# Patient Record
Sex: MALE | Race: WHITE | NOT HISPANIC OR LATINO | Employment: FULL TIME | URBAN - METROPOLITAN AREA
[De-identification: names, ages, dates, MRNs, and addresses within clinical notes are randomized per-mention and may not be internally consistent; named-entity substitution may affect disease eponyms.]

---

## 2017-04-03 ENCOUNTER — ALLSCRIPTS OFFICE VISIT (OUTPATIENT)
Dept: OTHER | Facility: OTHER | Age: 42
End: 2017-04-03

## 2017-04-04 LAB — S PYO AG THROAT QL: NEGATIVE

## 2017-04-06 ENCOUNTER — GENERIC CONVERSION - ENCOUNTER (OUTPATIENT)
Dept: OTHER | Facility: OTHER | Age: 42
End: 2017-04-06

## 2017-04-07 ENCOUNTER — ALLSCRIPTS OFFICE VISIT (OUTPATIENT)
Dept: OTHER | Facility: OTHER | Age: 42
End: 2017-04-07

## 2017-07-06 ENCOUNTER — ALLSCRIPTS OFFICE VISIT (OUTPATIENT)
Dept: OTHER | Facility: OTHER | Age: 42
End: 2017-07-06

## 2017-07-19 ENCOUNTER — ALLSCRIPTS OFFICE VISIT (OUTPATIENT)
Dept: OTHER | Facility: OTHER | Age: 42
End: 2017-07-19

## 2017-07-31 ENCOUNTER — GENERIC CONVERSION - ENCOUNTER (OUTPATIENT)
Dept: OTHER | Facility: OTHER | Age: 42
End: 2017-07-31

## 2017-08-02 ENCOUNTER — ALLSCRIPTS OFFICE VISIT (OUTPATIENT)
Dept: OTHER | Facility: OTHER | Age: 42
End: 2017-08-02

## 2017-08-02 LAB
A/G RATIO (HISTORICAL): 2.2 (ref 1.2–2.2)
ALBUMIN SERPL BCP-MCNC: 4.4 G/DL (ref 3.5–5.5)
ALP SERPL-CCNC: 55 IU/L (ref 39–117)
ALT SERPL W P-5'-P-CCNC: 20 IU/L (ref 0–44)
AST SERPL W P-5'-P-CCNC: 27 IU/L (ref 0–40)
BILIRUB SERPL-MCNC: 0.8 MG/DL (ref 0–1.2)
BUN SERPL-MCNC: 23 MG/DL (ref 6–24)
BUN/CREA RATIO (HISTORICAL): 21 (ref 9–20)
CALCIUM SERPL-MCNC: 8.8 MG/DL (ref 8.7–10.2)
CHLORIDE SERPL-SCNC: 101 MMOL/L (ref 96–106)
CO2 SERPL-SCNC: 24 MMOL/L (ref 18–29)
CREAT SERPL-MCNC: 1.08 MG/DL (ref 0.76–1.27)
EGFR AFRICAN AMERICAN (HISTORICAL): 98 ML/MIN/1.73
EGFR-AMERICAN CALC (HISTORICAL): 85 ML/MIN/1.73
GLUCOSE SERPL-MCNC: 87 MG/DL (ref 65–99)
POTASSIUM SERPL-SCNC: 4.6 MMOL/L (ref 3.5–5.2)
PROSTATE SPECIFIC ANTIGEN (HISTORICAL): 0.6 NG/ML (ref 0–4)
SODIUM SERPL-SCNC: 139 MMOL/L (ref 134–144)
TOT. GLOBULIN, SERUM (HISTORICAL): 2 G/DL (ref 1.5–4.5)
TOTAL PROTEIN (HISTORICAL): 6.4 G/DL (ref 6–8.5)

## 2017-08-03 ENCOUNTER — GENERIC CONVERSION - ENCOUNTER (OUTPATIENT)
Dept: OTHER | Facility: OTHER | Age: 42
End: 2017-08-03

## 2017-09-18 ENCOUNTER — ALLSCRIPTS OFFICE VISIT (OUTPATIENT)
Dept: OTHER | Facility: OTHER | Age: 42
End: 2017-09-18

## 2017-11-30 ENCOUNTER — GENERIC CONVERSION - ENCOUNTER (OUTPATIENT)
Dept: OTHER | Facility: OTHER | Age: 42
End: 2017-11-30

## 2017-11-30 ENCOUNTER — ALLSCRIPTS OFFICE VISIT (OUTPATIENT)
Dept: OTHER | Facility: OTHER | Age: 42
End: 2017-11-30

## 2018-01-12 VITALS
BODY MASS INDEX: 25.01 KG/M2 | DIASTOLIC BLOOD PRESSURE: 75 MMHG | HEIGHT: 68 IN | SYSTOLIC BLOOD PRESSURE: 130 MMHG | WEIGHT: 165 LBS

## 2018-01-12 VITALS
RESPIRATION RATE: 16 BRPM | TEMPERATURE: 97.6 F | HEIGHT: 68 IN | BODY MASS INDEX: 28.49 KG/M2 | SYSTOLIC BLOOD PRESSURE: 124 MMHG | DIASTOLIC BLOOD PRESSURE: 78 MMHG | WEIGHT: 188 LBS | HEART RATE: 76 BPM

## 2018-01-13 VITALS
WEIGHT: 167 LBS | TEMPERATURE: 96.9 F | HEART RATE: 72 BPM | HEIGHT: 68 IN | BODY MASS INDEX: 25.31 KG/M2 | DIASTOLIC BLOOD PRESSURE: 78 MMHG | RESPIRATION RATE: 16 BRPM | SYSTOLIC BLOOD PRESSURE: 118 MMHG

## 2018-01-13 NOTE — RESULT NOTES
Verified Results  (1) COMPREHENSIVE METABOLIC PANEL [id number] 94:91[facility]     Test Name Result Flag Reference   Glucose, Serum 87 mg/dL  65-99   BUN 23 mg/dL  6-24   Creatinine, Serum 1 08 mg/dL  0 76-1 27   BUN/Creatinine Ratio 21 H 9-20   Sodium, Serum 139 mmol/L  134-144   Potassium, Serum 4 6 mmol/L  3 5-5 2   Chloride, Serum 101 mmol/L     Carbon Dioxide, Total 24 mmol/L  18-29   Calcium, Serum 8 8 mg/dL  8 7-10 2   Protein, Total, Serum 6 4 g/dL  6 0-8 5   Albumin, Serum 4 4 g/dL  3 5-5 5   Globulin, Total 2 0 g/dL  1 5-4 5   A/G Ratio 2 2  1 2-2 2   Bilirubin, Total 0 8 mg/dL  0 0-1 2   Alkaline Phosphatase, S 55 IU/L     AST (SGOT) 27 IU/L  0-40   ALT (SGPT) 20 IU/L  0-44   eGFR If NonAfricn Am 85 mL/min/1 73  >59   eGFR If Africn Am 98 mL/min/1 73  >59

## 2018-01-14 VITALS
HEART RATE: 72 BPM | SYSTOLIC BLOOD PRESSURE: 110 MMHG | DIASTOLIC BLOOD PRESSURE: 70 MMHG | WEIGHT: 188 LBS | TEMPERATURE: 97 F | RESPIRATION RATE: 12 BRPM | BODY MASS INDEX: 28.59 KG/M2

## 2018-01-15 VITALS
WEIGHT: 165 LBS | SYSTOLIC BLOOD PRESSURE: 102 MMHG | BODY MASS INDEX: 25.01 KG/M2 | HEIGHT: 68 IN | DIASTOLIC BLOOD PRESSURE: 63 MMHG

## 2018-01-16 NOTE — MISCELLANEOUS
Message   Recorded as Task   Date: 04/06/2017 12:08 PM, Created By: Orly Nash   Task Name: Call Back   Assigned To: Daryl Orta   Regarding Patient: Nasra Garcia, Status: Active   Comment:    Rick Roy - 06 Apr 2017 12:08 PM     TASK CREATED  I left a message for Calli Lofton to call back  He had some questions and I left a message for him to call back so I can see what he needs Indra Peters - 06 Apr 2017 3:02 PM     TASK EDITED  I spoke with Calli Lofton  He had a bowel movement yesterday and he noticed bright red blood and then  again today with his bowel movement  No constipation  No pain with bowel movement  He just had a colonoscopy on 1/6/17 by Dr Sherly Alvarado and it was normal  He has no known hemorrhoids  He did have hemorrhoids removed last year  I made an appt for him to have this evaluated Indra Peters - 06 Apr 2017 4:21 PM     TASK EDITED  Appt for tomorrow with Dr Rubi Duran  He feels fine and has no pain  He is aware to call back at any time with any questions or concerns  The blood is only during his bowel movements and not in between  Alessia Stephenson - 06 Apr 2017 10:58 PM     TASK EDITED  noted        Signatures   Electronically signed by : Ally Galvan DO;  Apr 6 2017 10:58PM EST                       (Author)

## 2018-01-17 NOTE — PROGRESS NOTES
Assessment    1  Benign essential hypertension (401 1) (I10)   2  Encounter for preventive health examination (V70 0) (Z00 00)   3  Immunization due (V05 9) (Z23)   4  Neck pain (723 1) (M54 2)    Plan  Benign essential hypertension    · Olmesartan Medoxomil 20 MG Oral Tablet (Benicar); TAKE 1 TABLET DAILY   · (1) COMPREHENSIVE METABOLIC PANEL; Status:Active; Requested for:73Uzb0843;    · (1) LIPID PANEL FASTING W DIRECT LDL REFLEX; Status:Active; Requested  for:85Wcw9832;   Immunization due    · Fluzone Quadrivalent Intramuscular Suspension  Neck pain    · 1 Yajaira Domínguez MD, [facility]  *  Status: Hold For -  Scheduling  Requested for: 42UDU6917  Care Summary provided  : Yes    Discussion/Summary  The patient was counseled regarding risk factor reductions, impressions, risks and benefits of treatment options, importance of compliance with treatment  Possible side effects of new medications were reviewed with the patient/guardian today  The treatment plan was reviewed with the patient/guardian  The patient/guardian understands and agrees with the treatment plan      Chief Complaint  Seen for a CPE  er/cma  History of Present Illness  HM, Adult Male: The patient is being seen for a health maintenance evaluation  General Health: The patient's health since the last visit is described as good  Immunizations status: up to date  Lifestyle:  He consumes a diverse and healthy diet  He exercises regularly  He does not use tobacco  He denies alcohol use  Screening:   HPI: htn for over 7y  fam hx of htn  no other meds in past    left neck discomfort years      Review of Systems    Cardiovascular: no chest pain and no palpitations  Respiratory: no shortness of breath  Gastrointestinal: no abdominal pain  Active Problems    1  Adult BMI 25 0-25 9 kg/sq m (V85 21) (Z68 25)   2  Allergic rhinitis due to other allergic trigger, unspecified rhinitis seasonality   3   Benign essential hypertension (401 1) (I10)   4  Hallux rigidus of right foot (735 2) (M20 21)   5  Immunization due (V05 9) (Z23)   6  Internal hemorrhoids (455 0) (K64 8)   7  Prostate cancer screening (V76 44) (Z12 5)   8  Screening for cardiovascular, respiratory, and genitourinary diseases   (V81 2,V81 4,V81 6) (Z13 6,Z13 83,Z13 89)   9  Screening for diabetes mellitus (DM) (V77 1) (Z13 1)   10   Well adult on routine health check (V70 0) (Z00 00)    Past Medical History    · History of Acute conjunctivitis of right eye (372 00) (H10 31)   · History of Acute pharyngitis, unspecified etiology (462) (J02 9)   · Acute upper respiratory infection (465 9) (J06 9)   · History of Acute URI (465 9) (J06 9)   · History of Blood pressure elevated (401 9) (I10)   · History of Cellulitis of toe of right foot (681 10) (L03 031)   · History of Cough (786 2) (R05)   · History of Difficult or painful urination (788 1) (R30 0)   · History of Eye irritation (379 99) (H57 8)   · History of Eye pain (379 91) (H57 10)   · History of acute sinusitis (V12 69) (Z87 09)   · History of corneal abrasion (V15 59) (W36 132)   · History of ingrown nail (V13 3) (Z87 2)   · History of labyrinthitis (V12 49) (Z86 69)   · History of upper respiratory infection (V12 09) (Z87 09)   · History of urinary frequency (V13 09) (Z87 898)   · History of viral infection (V12 09) (Z86 19)   · History of IFG (impaired fasting glucose) (790 21) (R73 01)   · History of Leg pain (729 5) (M79 606)   · History of Oral lesion (528 9) (K13 70)   · History of Paronychia of toe of right foot (681 11) (L03 031)   · History of Prediabetes (790 29) (R73 03)   · History of Screening for cardiovascular condition (V81 2) (Z13 6)   · History of Screening for diabetes mellitus (V77 1) (Z13 1)   · History of Screening for thyroid disorder (V77 0) (Z13 29)   · History of Sprained Left Shoulder (840 9)    Surgical History    · History of Appendectomy   · History of Hernia Repair   · History of Knee Surgery   · History of Shoulder Surgery    Family History  Father    · Family history of CAD (coronary artery disease)   · Family history of Prostate cancer  Family History    · Family history of hypertension (V17 49) (Z82 49)   · Family history of Lymphoma (V16 7)   · Family history of Skin Cancer (V16 8)    Social History    · Being A Social Drinker   · Never a smoker    Current Meds   1  Anusol-HC 25 MG Rectal Suppository; insert one suppository 3x/d as needed; Therapy: 13BFJ0667 to (Last Rx:07Apr2017)  Requested for: 07Apr2017 Ordered   2  Claritin TABS; TAKE 1 TABLET DAILY AS NEEDED; Therapy: (Recorded:06Apr2017) to Recorded   3  Fish Oil CAPS; take 1 capsule daily; Therapy: (Recorded:18Sep2017) to Recorded   4  Fluticasone Propionate 50 MCG/ACT Nasal Suspension; USE 2 SPRAYS IN EACH   NOSTRIL ONCE DAILY; Therapy: 44VEL0776 to (Last Rx:06Apr2017)  Requested for: 06Apr2017 Ordered   5  Olmesartan Medoxomil 20 MG Oral Tablet; TAKE 1 TABLET DAILY; Therapy: 30FTH8075 to [phone])  Requested for: 96HZV6492; Last   Rx:05Poy5848 Ordered   6  Vitamin A 38517 UNIT Oral Tablet; TAKE 1 TABLET DAILY; Therapy: (Recorded:18Sep2017) to Recorded   7  Vitamin B Complex CAPS; take 1 capsule daily; Therapy: (Recorded:18Sep2017) to Recorded   8  Vitamin C TABS; TAKE 1 TABLET DAILY; Therapy: (Recorded:18Sep2017) to Recorded   9  Vitamin D TABS; Take 1 tablet daily; Therapy: (Recorded:45Tjw4252) to Recorded   10  Vitamin E TABS; TAKE 1 TABLET DAILY; Therapy: (Recorded:82Old9994) to Recorded    Allergies    1  Lisinopril TABS    2  Bee sting   3  Other   4  Seasonal    Vitals   Recorded: 18Sep2017 09:47AM   Temperature 97 5 F   Heart Rate 76   Respiration 20   Systolic 203   Diastolic 70   Height 5 ft 8 in   Weight 170 lb    BMI Calculated 25 85   BSA Calculated 1 91     Physical Exam    Constitutional   General appearance: No acute distress, well appearing and well nourished      Eyes Conjunctiva and lids: No erythema, swelling or discharge  Pupils and irises: Equal, round, reactive to light  Ears, Nose, Mouth, and Throat   External inspection of ears and nose: Normal     Otoscopic examination: Tympanic membranes translucent with normal light reflex  Canals patent without erythema  Nasal mucosa, septum, and turbinates: Normal without edema or erythema  Lips, teeth, and gums: Normal, good dentition  Oropharynx: Normal with no erythema, edema, exudate or lesions  Neck   Neck: Supple, symmetric, trachea midline, no masses  Pulmonary   Respiratory effort: No increased work of breathing or signs of respiratory distress  Auscultation of lungs: Clear to auscultation  Cardiovascular   Auscultation of heart: Normal rate and rhythm, normal S1 and S2, no murmurs  Carotid pulses: 2+ bilaterally  Pedal pulses: 2+ bilaterally  Examination of extremities for edema and/or varicosities: Normal     Chest   Chest: Normal     Abdomen   Abdomen: Non-tender, no masses  Liver and spleen: No hepatomegaly or splenomegaly  Examination for hernias: No hernias appreciated  small RIH  Anus, perineum, and rectum: Normal sphincter tone, no masses, no prolapse  Genitourinary   Scrotal contents: Normal testes, no masses  Penis: Normal, no lesions  Digital rectal exam of prostate: Normal size, no masses  Lymphatic   Palpation of lymph nodes in neck: No lymphadenopathy  Palpation of lymph nodes in groin: No lymphadenopathy  Musculoskeletal   Gait and station: Normal     Inspection/palpation of digits and nails: Normal without clubbing or cyanosis  Inspection/palpation of joints, bones, and muscles: Normal     Skin   Skin and subcutaneous tissue: Normal without rashes or lesions  Palpation of skin and subcutaneous tissue: Normal turgor  Neurologic   Reflexes: 2+ and symmetric  Sensation: No sensory loss      Psychiatric   Judgment and insight: Normal  Mood and affect: Normal        Procedure    Procedure: Visual Acuity Test    Indication: routine screening  Inforrmation supplied by a Snellen chart     Results: 20/13 in both eyes without corrective device, 20/13 in the right eye without corrective device, 20/13 in the left eye without corrective device      Provider Comments  Provider Comments:   months, pressure, occas pain, radiates to ear and side of mouth and left side of tongue---ENt eval      Signatures   Electronically signed by : Federico Mcclure DO; Sep 18 2017 12:53PM EST                       (Author)

## 2018-01-17 NOTE — RESULT NOTES
Verified Results  (1) CBC/PLT/DIFF [id number] 09:13AM Shan[facility] Valle     Test Name Result Flag Reference   WBC 5 8 x10E3/uL  3 4-10 8   RBC 4 92 x10E6/uL  4 14-5 80   Hemoglobin 14 8 g/dL  12 6-17 7   Hematocrit 43 9 %  37 5-51 0   MCV 89 fL  79-97   MCH 30 1 pg  26 6-33 0   Baso (Absolute) 0 0 x10E3/uL  0 0-0 2   Immature Granulocytes 0 %     Immature Grans (Abs) 0 0 x10E3/uL  0 0-0 1   Eos 2 %     Basos 1 %     Neutrophils (Absolute) 3 4 x10E3/uL  1 4-7 0   Lymphs (Absolute) 1 8 x10E3/uL  0 7-3 1   Monocytes(Absolute) 0 5 x10E3/uL  0 1-0 9   Eos (Absolute) 0 1 x10E3/uL  0 0-0 4   MCHC 33 7 g/dL  31 5-35 7   RDW 13 6 %  12 3-15 4   Platelets 005 E56E7/XV  150-379   Neutrophils 57 %     Lymphs 32 %     Monocytes 8 %       (1) COMPREHENSIVE METABOLIC PANEL [id number] 59:09NW [facility]elle Val[facility]     Test Name Result Flag Reference   Glucose, Serum 104 mg/dL H 65-99   BUN 25 mg/dL H 6-24   Creatinine, Serum 0 97 mg/dL  0 76-1 27   eGFR If NonAfricn Am 97 mL/min/1 73  >59   eGFR If Africn Am 112 mL/min/1 73  >59   BUN/Creatinine Ratio 26 H 9-20   ALT (SGPT) 21 IU/L  0-44   Albumin, Serum 4 5 g/dL  3 5-5 5   Globulin, Total 2 3 g/dL  1 5-4 5   A/G Ratio 2 0  1 1-2 5   Bilirubin, Total 0 4 mg/dL  0 0-1 2   Alkaline Phosphatase, S 77 IU/L     AST (SGOT) 18 IU/L  0-40   Sodium, Serum 141 mmol/L  134-144   Potassium, Serum 4 6 mmol/L  3 5-5 2   Chloride, Serum 102 mmol/L     Carbon Dioxide, Total 26 mmol/L  18-29   Calcium, Serum 8 7 mg/dL  8 7-10 2   Protein, Total, Serum 6 8 g/dL  6 0-8 5     (1) LIPID PANEL FASTING W DIRECT LDL REFLEX [id number] 09:13AM Shanelle Valle     Test Name Result Flag Reference   Cholesterol, Total 179 mg/dL  100-199   Triglycerides 160 mg/dL H 0-149   HDL Cholesterol 37 mg/dL L >39   According to ATP-III Guidelines, HDL-C >59 mg/dL is considered a  negative risk factor for CHD     LDL Cholesterol Calc 110 mg/dL H 0-99       Discussion/Summary   Your sugar is mildly elevated as well as your cholesterol  Please follow a heart healthy diet and exercise  recommend starting over the counter omega 3  Follow up at your regularly scheduled appointment    rl

## 2018-01-22 VITALS
BODY MASS INDEX: 25.76 KG/M2 | HEART RATE: 76 BPM | RESPIRATION RATE: 20 BRPM | WEIGHT: 170 LBS | SYSTOLIC BLOOD PRESSURE: 108 MMHG | DIASTOLIC BLOOD PRESSURE: 70 MMHG | TEMPERATURE: 97.5 F | HEIGHT: 68 IN

## 2018-01-22 VITALS
WEIGHT: 180 LBS | HEIGHT: 68 IN | DIASTOLIC BLOOD PRESSURE: 96 MMHG | BODY MASS INDEX: 27.28 KG/M2 | SYSTOLIC BLOOD PRESSURE: 140 MMHG

## 2018-03-07 NOTE — CONSULTS
Assessment    1  TMJ syndrome (825 13) (D20 236)    Chief Complaint  Chief Complaint Free Text Note Form: Mragot Frausto is here with c/o swelling,numbness left side of his face, mostly jaw,neck,and ear for over a month      History of Present Illness  HPI: He presents with left neck swelling with associated intermittent left facial swelling  He has had some intermittent left sided facial and tongue numbness as well  Symptoms present for several months  Some weight gain  Normal energy level  Denies jaw or throat/neck pain  Denies odynophagia  Some associated intermittent otalgia and a fullness sensation in the left ear  Otalgia has been severe for up to 2 days during this interval  Not sure whether the otalgia and facial swelling were temporally related  Denies neck/facial increase in swelling with eating  Denies dysphagia  Family history of: esophageal cancer (grandfather), melanoma (grandfather); father and grandfather (prostate cancer); lymphoma (grandfather)      Review of Systems  Complete ENT ROS [facility]:   Eyes: No complaints of itching, excessive tearing or vision changes  Ears: Pressure in left ear and tinnitus, but as noted in HPI  Nose: No nasal obstruction, no discharge or runniness, no bleeding, no dryness, no sneezing and no loss of smell  Mouth: numbness tongue and left side, but as noted in HPI  Throat: throat pain, but no difficulty swallowing  Neck: Left side, soreness and lump or swelling in the neck, but as noted in HPI  Genitourinary: frequent urination  Cardiovascular: No complaints of chest pain or palpitations  Respiratory: cough  Gastrointestinal: No complaints of heartburn, nausea/vomiting, or constipation  Neurological: No complaints of headache, convulsions or memory loss  ROS Reviewed:   ROS reviewed  Active Problems    1  Adult BMI 25 0-25 9 kg/sq m (V85 21) (Z68 25)   2  Allergic rhinitis due to other allergic trigger, unspecified rhinitis seasonality   3  Benign essential hypertension (401 1) (I10)   4  Hallux rigidus of right foot (735 2) (M20 21)   5  Immunization due (V05 9) (Z23)   6  Internal hemorrhoids (455 0) (K64 8)   7  Neck pain (723 1) (M54 2)   8  Prostate cancer screening (V76 44) (Z12 5)   9  Screening for cardiovascular, respiratory, and genitourinary diseases   (V81 2,V81 4,V81 6) (Z13 6,Z13 83,Z13 89)   10  Screening for diabetes mellitus (DM) (V77 1) (Z13 1)   11  Well adult on routine health check (V70 0) (Z00 00)    Past Medical History    1  History of Acute conjunctivitis of right eye (372 00) (H10 31)   2  History of Acute pharyngitis, unspecified etiology (462) (J02 9)   3  Acute upper respiratory infection (465 9) (J06 9)   4  History of Acute URI (465 9) (J06 9)   5  History of Blood pressure elevated (401 9) (I10)   6  History of Cellulitis of toe of right foot (681 10) (L03 031)   7  History of Cough (786 2) (R05)   8  History of Difficult or painful urination (788 1) (R30 0)   9  History of Eye irritation (379 99) (H57 8)   10  History of Eye pain (379 91) (H57 10)   11  History of acute sinusitis (V12 69) (Z87 09)   12  History of corneal abrasion (V15 59) (Z87 828)   13  History of ingrown nail (V13 3) (Z87 2)   14  History of labyrinthitis (V12 49) (Z86 69)   15  History of upper respiratory infection (V12 09) (Z87 09)   16  History of urinary frequency (V13 09) (Z87 898)   17  History of viral infection (V12 09) (Z86 19)   18  History of IFG (impaired fasting glucose) (790 21) (R73 01)   19  History of Leg pain (729 5) (M79 606)   20  History of Oral lesion (528 9) (K13 70)   21  History of Paronychia of toe of right foot (681 11) (L03 031)   22  History of Prediabetes (790 29) (R73 03)   23  History of Screening for cardiovascular condition (V81 2) (Z13 6)   24  History of Screening for diabetes mellitus (V77 1) (Z13 1)   25  History of Screening for thyroid disorder (V77 0) (Z13 29)   26   History of Sprained Left Shoulder (840 9)  Past Medical History Reviewed: The past medical history was reviewed and updated today  Surgical History    1  History of Appendectomy   2  History of Hernia Repair   3  History of Knee Surgery   4  History of Shoulder Surgery  Surgical History Reviewed: The surgical history was reviewed and updated today  Family History  Father    1  Family history of CAD (coronary artery disease)   2  Family history of Prostate cancer  Family History    3  Family history of hypertension (V17 49) (Z82 49)   4  Family history of Lymphoma (V16 7)   5  Family history of Skin Cancer (V16 8)  Family History Reviewed: The family history was reviewed and updated today  Social History    · Being A Social Drinker   · Never a smoker  Social History Reviewed: The social history was reviewed and updated today  Current Meds   1  Anusol-HC 25 MG Rectal Suppository; insert one suppository 3x/d as needed; Therapy: 19SKF1412 to (Last Rx:07Apr2017)  Requested for: 07Apr2017 Ordered   2  Claritin TABS; TAKE 1 TABLET DAILY AS NEEDED; Therapy: (Recorded:06Apr2017) to Recorded   3  Fish Oil CAPS; take 1 capsule daily; Therapy: (Recorded:17Gim1211) to Recorded   4  Fluticasone Propionate 50 MCG/ACT Nasal Suspension; USE 2 SPRAYS IN EACH   NOSTRIL ONCE DAILY; Therapy: 81EPE6404 to (Last Rx:06Apr2017)  Requested for: 06Apr2017 Ordered   5  Olmesartan Medoxomil 20 MG Oral Tablet; TAKE 1 TABLET DAILY; Therapy: 37QQF7755 to (Evaluate:23Gxe9365)  Requested for: 18Sep2017; Last   Rx:18Sep2017 Ordered   6  Vitamin A 98954 UNIT Oral Tablet; TAKE 1 TABLET DAILY; Therapy: (Recorded:20Ujo2598) to Recorded   7  Vitamin B Complex CAPS; take 1 capsule daily; Therapy: (Recorded:80Ulx4003) to Recorded   8  Vitamin C TABS; TAKE 1 TABLET DAILY; Therapy: (Recorded:51Ivt8886) to Recorded   9  Vitamin D TABS; Take 1 tablet daily; Therapy: (Recorded:79Ovl2604) to Recorded   10   Vitamin E TABS; TAKE 1 TABLET DAILY; Therapy: (Recorded:27Evv3873) to Recorded    Allergies    1  Lisinopril TABS    2  Bee sting   3  Other   4  Seasonal    Vitals  Signs   Recorded: 12YXM5212 76:98NU   Systolic: 266, LUE, Sitting  Diastolic: 96, LUE, Sitting  Height: 5 ft 8 in  Weight: 180 lb   BMI Calculated: 27 37  BSA Calculated: 1 95    Physical Exam  Constitutional:  Well developed, well nourished and groomed, in no acute distress  Eyes:  Extra-ocular movements intact, pupils equally round and reactive to light and accommodation, the lids and conjunctivae are normal in appearance  HEENT:    Head: Atraumatic, normocephalic, no visible scalp lesions, bony palpation unremarkable without stepoffs, parotid and submandibular salivary glands non-tender to palpation and without masses bilaterally  Ears:  Auricles normal in appearance bilaterally, mastoid prominence non-tender, external auditory canals clear bilaterally, tympanic membranes intact bilaterally without evidence of middle ear effusion or masses, normal appearing ossicles  Left TM mildly retracted  Nose/Sinuses:  External appearance unremarkable, no maxillary or frontal sinus tenderness to palpation bilaterally, anterior rhinoscopy reveals normal appearing mucosa, without polyps or masses  Oral Cavity:  Moist mucus membranes, gums dentition unremarkable, no oral mucosal masses or lesions, floor of mouth soft, tongue mobile without masses or lesions  Oropharynx:  Base of tongue soft and without masses, tonsils bilaterally unremarkable, soft palate mucosa unremarkable, laryngeal mirror exam unrevealing  Neck:  No visible or palpable cervical lesions or lymphadenopathy, thyroid gland is normal in size and symmetry and without masses, normal laryngeal elevation with swallowing  Cardiovascular:  Normal rate and rhythm, no palpable thrills, no jugulovenous distension observed    Respiratory:  Normal respiratory effort without evidence of retractions or use of accessory muscles  Integument:  Normal appearing without observed masses or lesions  Neurologic:  Cranial nerves II-XII intact bilaterally  Psychiatric:  Alert and oriented to time, place and person, normal affect  Discussion/Summary  Discussion Summary:   His exam was normal today  I did not detect any concerning facial or cervical masses or neck edema  Based on his history, I suspect his symptoms are related to TMJ/myofascial pain and inflammation   I do not believe a CT neck is necessary at this point, as his exam is otherwise normal  There is a mildly retracted left TM suggestive of left sided ETD, but he is otherwise normal  I asked him to follow up if symptoms become sustained or if there is any change in his physical exam       Signatures   Electronically signed by : PETTY Walter ; Nov 30 2017 12:08PM EST                       (Author)

## 2018-03-15 ENCOUNTER — OFFICE VISIT (OUTPATIENT)
Dept: FAMILY MEDICINE CLINIC | Facility: CLINIC | Age: 43
End: 2018-03-15
Payer: COMMERCIAL

## 2018-03-15 VITALS
TEMPERATURE: 97.6 F | BODY MASS INDEX: 27.89 KG/M2 | WEIGHT: 184 LBS | HEART RATE: 72 BPM | SYSTOLIC BLOOD PRESSURE: 142 MMHG | DIASTOLIC BLOOD PRESSURE: 90 MMHG | HEIGHT: 68 IN | RESPIRATION RATE: 18 BRPM

## 2018-03-15 DIAGNOSIS — H69.82 EUSTACHIAN TUBE DYSFUNCTION, LEFT: Primary | ICD-10-CM

## 2018-03-15 PROBLEM — K64.8 INTERNAL HEMORRHOIDS: Status: ACTIVE | Noted: 2017-04-07

## 2018-03-15 PROBLEM — M20.21 HALLUX RIGIDUS OF RIGHT FOOT: Status: ACTIVE | Noted: 2017-07-19

## 2018-03-15 PROBLEM — J30.9 ALLERGIC RHINITIS: Status: ACTIVE | Noted: 2017-04-06

## 2018-03-15 PROBLEM — M26.629 TMJ SYNDROME: Status: ACTIVE | Noted: 2017-11-30

## 2018-03-15 PROCEDURE — 99213 OFFICE O/P EST LOW 20 MIN: CPT | Performed by: NURSE PRACTITIONER

## 2018-03-15 RX ORDER — LORATADINE 10 MG/1
1 TABLET ORAL DAILY PRN
COMMUNITY
End: 2018-11-09

## 2018-03-15 RX ORDER — NEOMYCIN SULFATE, POLYMYXIN B SULFATE AND HYDROCORTISONE 10; 3.5; 1 MG/ML; MG/ML; [USP'U]/ML
3 SUSPENSION/ DROPS AURICULAR (OTIC) 4 TIMES DAILY
Qty: 10 ML | Refills: 0 | Status: SHIPPED | OUTPATIENT
Start: 2018-03-15 | End: 2018-09-26 | Stop reason: HOSPADM

## 2018-03-15 RX ORDER — TOBRAMYCIN AND DEXAMETHASONE 3; 1 MG/ML; MG/ML
1 SUSPENSION/ DROPS OPHTHALMIC
Qty: 10 ML | Refills: 0 | Status: SHIPPED | OUTPATIENT
Start: 2018-03-15 | End: 2018-03-15 | Stop reason: CLARIF

## 2018-03-15 RX ORDER — CYANOCOBALAMIN (VITAMIN B-12) 500 MCG
1 LOZENGE ORAL DAILY
COMMUNITY
End: 2022-02-10 | Stop reason: ALTCHOICE

## 2018-03-15 RX ORDER — RIBOFLAVIN (VITAMIN B2) 100 MG
1 TABLET ORAL DAILY
COMMUNITY
End: 2021-05-10 | Stop reason: ALTCHOICE

## 2018-03-15 RX ORDER — FLUTICASONE PROPIONATE 50 MCG
2 SPRAY, SUSPENSION (ML) NASAL DAILY
COMMUNITY
Start: 2014-11-18 | End: 2018-11-30 | Stop reason: SDUPTHER

## 2018-03-15 RX ORDER — HYDROCORTISONE ACETATE 25 MG/1
SUPPOSITORY RECTAL
COMMUNITY
Start: 2017-04-07 | End: 2018-09-26 | Stop reason: HOSPADM

## 2018-03-15 NOTE — PATIENT INSTRUCTIONS
Will use drop and if no improvement, will follow up with ENT  Supportive care discussed and advised  Follow up for no improvement and worsening of conditions  Patient advised and educated when to see immediate medical care  Eustachian Tube Dysfunction   WHAT YOU NEED TO KNOW:   What is eustachian tube dysfunction? Eustachian tube dysfunction (ETD) is a condition that prevents your eustachian tubes from opening properly  It can also cause them to become blocked  Eustachian tubes connect your middle ear to the back of your nose and throat  These tubes open and allow air to flow in and out when you sneeze, swallow, or yawn  What causes or increases my risk of ETD? ETD may be caused by swelling or buildup of mucus in your eustachian tubes  Allergies, a cold, or sinus infection can increase your risk for ETD  Smoking also increases your risk for ETD  What are the signs and symptoms of ETD? · Fullness or pressure in your ears    · Muffled hearing    · Pain in one or both ears    · Ringing in your ears    · Popping or clicking feeling in your ears    · Trouble keeping your balance  How is ETD diagnosed? Your healthcare provider will ask about your symptoms  He will examine your ears, your nose, and the back of your throat  He may also do a hearing test    How is ETD treated? Your ETD may get better on its own without any treatment  You may need any of the following:  · Exercises  such as swallowing, yawning, or chewing gum may help to open your eustachian tubes  Your healthcare provider may also recommend that you take a deep breath and then blow with your mouth shut and your nostrils pinched closed  · Air pressure devices  push air into your nose and eustachian tubes to help relieve air pressure in your ear  · Treatment for allergies  such as decongestants, antihistamines, and nasal steroids may improve ETD  They may help decrease swelling of the eustachian tubes       · Ear tubes  may help to keep your middle ear open  During this procedure, your healthcare provider will cut a small hole in your eardrum  · A myringotomy  is procedure to make a small cut in your eardrum and suction out fluid from your middle ear  · Tuboplasty  is a procedure to widen your eustachian tubes  When should I contact my healthcare provider? · Your symptoms do not improve or get worse  · You have a fever  · You have any hearing loss  · You have questions or concerns about your condition or care  CARE AGREEMENT:   You have the right to help plan your care  Learn about your health condition and how it may be treated  Discuss treatment options with your caregivers to decide what care you want to receive  You always have the right to refuse treatment  The above information is an  only  It is not intended as medical advice for individual conditions or treatments  Talk to your doctor, nurse or pharmacist before following any medical regimen to see if it is safe and effective for you  © 2017 [employer]  Information is for End User's use only and may not be sold, redistributed or otherwise used for commercial purposes  All illustrations and images included in CareNotes® are the copyrighted property of [employer]  or Raul Cruz

## 2018-03-15 NOTE — PROGRESS NOTES
Assessment/Plan:  Will use drop and if no improvement, will follow up with ENT  Supportive care discussed and advised  Follow up for no improvement and worsening of conditions  Patient advised and educated when to see immediate medical care  Diagnoses and all orders for this visit:    Eustachian tube dysfunction, left  -     tobramycin-dexamethasone (TOBRADEX) ophthalmic suspension; Administer 1 drop into the left eye every 4 (four) hours while awake    BMI 27 0-27 9,adult    Other orders  -     hydrocortisone (ANUSOL-HC) 25 mg suppository; Insert into the rectum  -     loratadine (CLARITIN) 10 mg tablet; Take 1 tablet by mouth daily as needed  -     Omega-3 Fatty Acids (FISH OIL) 645 MG CAPS; Take 1 capsule by mouth daily  -     fluticasone (FLONASE) 50 mcg/act nasal spray; 2 sprays into each nostril daily  -     vitamin A 10,000 units capsule; Take 1 tablet by mouth daily  -     VITAMIN B COMPLEX-C PO; Take 1 capsule by mouth daily  -     Ascorbic Acid (VITAMIN C) 100 MG tablet; Take 1 tablet by mouth daily  -     cholecalciferol (VITAMIN D3) 1,000 units tablet; Take 1 tablet by mouth daily  -     Vitamin E 400 units TABS; Take 1 tablet by mouth daily          Return if symptoms worsen or fail to improve  Subjective:      Patient ID: Ina Singh is a 44 y o  male  Chief Complaint   Patient presents with   Maren Layer       HPI  Patient stated that having intermittent discomfort in the left ear from couple of months and sometimes outside burns without any rash and sometimes feels in his cheek  Denies any discharge from ear, hearing loss, headache, dizziness and visual disturbances  The following portions of the patient's history were reviewed and updated as appropriate: allergies, current medications, past family history, past medical history, past social history, past surgical history and problem list       Review of Systems   Constitutional: Negative  HENT: Positive for ear pain  Negative for congestion, dental problem, drooling, ear discharge, facial swelling, hearing loss, mouth sores, nosebleeds, postnasal drip, rhinorrhea, sinus pain, sinus pressure, sneezing, sore throat, tinnitus, trouble swallowing and voice change  Eyes: Negative  Respiratory: Negative  Cardiovascular: Negative  Skin: Negative  Neurological: Negative  Psychiatric/Behavioral: Negative  Objective:    History   Smoking Status    Never Smoker   Smokeless Tobacco    Never Used       Allergies: Allergies   Allergen Reactions    Bee Venom     Flomax [Tamsulosin Hcl] Other (See Comments)     hypotension    Lisinopril     Pollen Extract     Tamsulosin Other (See Comments)     hypotension  hypotension       Vitals:  /90   Pulse 72   Temp 97 6 °F (36 4 °C)   Resp 18   Ht 5' 8" (1 727 m)   Wt 83 5 kg (184 lb)   BMI 27 98 kg/m²     Current Outpatient Prescriptions   Medication Sig Dispense Refill    Ascorbic Acid (VITAMIN C) 100 MG tablet Take 1 tablet by mouth daily      cholecalciferol (VITAMIN D3) 1,000 units tablet Take 1 tablet by mouth daily      fluticasone (FLONASE) 50 mcg/act nasal spray 2 sprays into each nostril daily      hydrocortisone (ANUSOL-HC) 25 mg suppository Insert into the rectum      loratadine (CLARITIN) 10 mg tablet Take 1 tablet by mouth daily as needed      olmesartan (BENICAR) 20 mg tablet Take 20 mg by mouth daily   Omega-3 Fatty Acids (FISH OIL) 645 MG CAPS Take 1 capsule by mouth daily      vitamin A 10,000 units capsule Take 1 tablet by mouth daily      VITAMIN B COMPLEX-C PO Take 1 capsule by mouth daily      Vitamin E 400 units TABS Take 1 tablet by mouth daily      oxyCODONE-acetaminophen (PERCOCET) 5-325 mg per tablet Take 1 tablet by mouth every 4 (four) hours as needed for moderate pain   Max Daily Amount: 6 tablets 30 tablet 0    tobramycin-dexamethasone (TOBRADEX) ophthalmic suspension Administer 1 drop into the left eye every 4 (four) hours while awake 10 mL 0     No current facility-administered medications for this visit  Physical Exam   Constitutional: He is oriented to person, place, and time  He appears well-developed and well-nourished  HENT:   Head: Normocephalic  Right Ear: Tympanic membrane, external ear and ear canal normal    Left Ear: External ear and ear canal normal  A middle ear effusion is present  Nose: Nose normal  Right sinus exhibits no maxillary sinus tenderness and no frontal sinus tenderness  Left sinus exhibits no maxillary sinus tenderness and no frontal sinus tenderness  Mouth/Throat: Oropharynx is clear and moist and mucous membranes are normal    Neck: Neck supple  Cardiovascular: Normal rate, regular rhythm and normal heart sounds  Pulmonary/Chest: Effort normal and breath sounds normal    Musculoskeletal: Normal range of motion  Lymphadenopathy:        Right cervical: No superficial cervical and no posterior cervical adenopathy present  Left cervical: No superficial cervical and no posterior cervical adenopathy present  Neurological: He is alert and oriented to person, place, and time  Skin: Skin is warm and dry  Psychiatric: He has a normal mood and affect  His behavior is normal  Judgment and thought content normal    Vitals reviewed          LAINE Bajwa

## 2018-05-17 ENCOUNTER — OFFICE VISIT (OUTPATIENT)
Dept: AUDIOLOGY | Facility: CLINIC | Age: 43
End: 2018-05-17
Payer: COMMERCIAL

## 2018-05-17 ENCOUNTER — OFFICE VISIT (OUTPATIENT)
Dept: OTOLARYNGOLOGY | Facility: CLINIC | Age: 43
End: 2018-05-17
Payer: COMMERCIAL

## 2018-05-17 VITALS
HEIGHT: 68 IN | WEIGHT: 181.2 LBS | DIASTOLIC BLOOD PRESSURE: 90 MMHG | BODY MASS INDEX: 27.46 KG/M2 | SYSTOLIC BLOOD PRESSURE: 140 MMHG

## 2018-05-17 DIAGNOSIS — H92.02 OTALGIA OF LEFT EAR: Primary | ICD-10-CM

## 2018-05-17 DIAGNOSIS — IMO0001 LEFT ASYMMETRICAL SNHL: ICD-10-CM

## 2018-05-17 DIAGNOSIS — R20.0 LEFT FACIAL NUMBNESS: ICD-10-CM

## 2018-05-17 PROCEDURE — 99214 OFFICE O/P EST MOD 30 MIN: CPT | Performed by: OTOLARYNGOLOGY

## 2018-05-17 PROCEDURE — 92567 TYMPANOMETRY: CPT | Performed by: AUDIOLOGIST

## 2018-05-17 PROCEDURE — 92557 COMPREHENSIVE HEARING TEST: CPT | Performed by: AUDIOLOGIST

## 2018-05-17 NOTE — LETTER
May 17, 2018     [place] Alert, DO  [address] [address]    Patient: Glo Sauceda   YOB: 1975   Date of Visit: 5/17/2018       Dear Dr Jeevan Triplett: Thank you for referring Tova Paras to me for evaluation  Below are my notes for this consultation  If you have questions, please do not hesitate to call me  I look forward to following your patient along with you  Sincerely,        Carlo Travis MD        CC: No Recipients  Carlo Travis MD  5/17/2018 10:16 AM  Sign at close encounter  [facility] Follow up visit      CC: ear problems      Time interval of problem since last visit:  6 months    Pertinent interval elements of the history:  He has been bothered by left ear intermittent burning left ear pain and occasional fullness for the past two months  No otorrhea  No change in hearing  No dizziness/vertigo  Denies regular tinnitus  Denies clenching/bruxism  Of note, he had a CT brain in 2014 that was normal     Associated symptoms:  Some left facial swelling  Intermittent facial numbness    Effect of interventions since last visit:   No change    Review of any relevant imaging:      Interval Review of systems:  General: no weight change, normal energy  CV: no palpitations or chest pain  Pulm: no shortness of breath    Interval Social History:  Social History     Social History    Marital status: /Civil Union     Spouse name: N/A    Number of children: N/A    Years of education: N/A     Occupational History    Not on file       Social History Main Topics    Smoking status: Never Smoker    Smokeless tobacco: Never Used    Alcohol use No    Drug use: No    Sexual activity: Not on file     Other Topics Concern    Not on file     Social History Narrative    No narrative on file       Interval Physical Examination:  /90 (BP Location: Left arm, Patient Position: Sitting, Cuff Size: Standard)   Ht 5' 8" (1 727 m) Wt 82 2 kg (181 lb 3 2 oz)   BMI 27 55 kg/m²    NAD  AS/AD: clear; AS drum does not move with autoinsufflation  Rinne positive bilaterally, [place] midline  No TMJ clicking or TTP    Interval endoscopy:          Assessment:  1  Otalgia of left ear  Audiogram screen    MRI brain IAC wo and w contrast    CANCELED: MRI brain w wo contrast   2  Left facial numbness  MRI brain IAC wo and w contrast    CANCELED: MRI brain w wo contrast   3  Left asymmetrical SNHL  MRI brain IAC wo and w contrast       Plan:  1  He has a high frequency sensorineural hearing loss in the left ear  When this is combined with his left-sided burning otalgia and facial numbness symptoms I am concerned for a lesion either retrocochlear or within the brain itself  Therefore have ordered an MRI of the internal auditory canals and brain  He will follow up after the study to review the results

## 2018-05-17 NOTE — PROGRESS NOTES
[facility] Follow up visit      CC: ear problems      Time interval of problem since last visit:  6 months    Pertinent interval elements of the history:  He has been bothered by left ear intermittent burning left ear pain and occasional fullness for the past two months  No otorrhea  No change in hearing  No dizziness/vertigo  Denies regular tinnitus  Denies clenching/bruxism  Of note, he had a CT brain in 2014 that was normal     Associated symptoms:  Some left facial swelling  Intermittent facial numbness    Effect of interventions since last visit:   No change    Review of any relevant imaging:      Interval Review of systems:  General: no weight change, normal energy  CV: no palpitations or chest pain  Pulm: no shortness of breath    Interval Social History:  Social History     Social History    Marital status: /Civil Union     Spouse name: N/A    Number of children: N/A    Years of education: N/A     Occupational History    Not on file  Social History Main Topics    Smoking status: Never Smoker    Smokeless tobacco: Never Used    Alcohol use No    Drug use: No    Sexual activity: Not on file     Other Topics Concern    Not on file     Social History Narrative    No narrative on file       Interval Physical Examination:  /90 (BP Location: Left arm, Patient Position: Sitting, Cuff Size: Standard)   Ht 5' 8" (1 727 m)   Wt 82 2 kg (181 lb 3 2 oz)   BMI 27 55 kg/m²   NAD  AS/AD: clear; AS drum does not move with autoinsufflation  Rinne positive bilaterally, [place] midline  No TMJ clicking or TTP    Interval endoscopy:          Assessment:  1  Otalgia of left ear  Audiogram screen    MRI brain IAC wo and w contrast    CANCELED: MRI brain w wo contrast   2  Left facial numbness  MRI brain IAC wo and w contrast    CANCELED: MRI brain w wo contrast   3  Left asymmetrical SNHL  MRI brain IAC wo and w contrast       Plan:  1    His audiogram demonstrates a high frequency sensorineural hearing loss in the left ear  When this is combined with his left-sided burning otalgia and facial numbness symptoms I am concerned for a lesion either retrocochlear or within the brain itself  Therefore have ordered an MRI of the internal auditory canals and brain  He will follow up after the study to review the results

## 2018-05-17 NOTE — LETTER
May 22, 2018     Tyshawn Sarkar, DO  [address] [address]    Patient: Gladis Cota   YOB: 1975   Date of Visit: 2018       Dear Dr Fredda Phoenix:    Suleiman Gomez was seen audiologically as part of an assessment by Dr Barbara Salas (ENT)  Below are my notes for this consultation  If you have questions, please do not hesitate to call me  Sincerely,        Jesusa Favre, Au D , CCC/A  Clinical Audiologist   [place]  [id number]        CC: No Recipients  Jesusa Favre  2018  2:18 PM  Sign at close encounter  [facility] [address] EVALUATION      Name:  Gladis Cota  :  1975  Age:  44 y o  Date of Evaluation: 18    History: ear pain left ear, work related noise exposure   Reason for visit: Gladis Cota is being seen today at the request of Dr Ti Robbins for an evaluation of hearing  EVALUATION:    Otoscopic Evaluation:   Right Ear: clear canal   Left Ear: clear canal     Tympanometry:  Normal middle ear functioning, bilaterally  (See tracings)     Audiogram Results:    *see attached audiogram    RECOMMENDATIONS:  1  Follow-up per Dr Ti Robbins  2  Follow-up audiologically per ENT or annually for monitoring purposes  PATIENT EDUCATION:   Discussed results and recommendations with Mr Hernandezry Andre  Questions were addressed and the patient was encouraged to contact our department should concerns arise      Jesusa Favre, Au D , CCC-A, [place]# [id number], Hearing Aid Dispenser, [place]# [id number]  Clinical Audiologist

## 2018-05-21 DIAGNOSIS — R93.89 ABNORMAL MRI: Primary | ICD-10-CM

## 2018-05-22 LAB
BUN SERPL-MCNC: 22 MG/DL (ref 6–24)
BUN/CREAT SERPL: 20 (ref 9–20)
CREAT SERPL-MCNC: 1.09 MG/DL (ref 0.76–1.27)
SL AMB EGFR AFRICAN AMERICAN: 96 ML/MIN/1.73
SL AMB EGFR NON AFRICAN AMERICAN: 83 ML/MIN/1.73

## 2018-05-22 NOTE — PROGRESS NOTES
AUDIOLOGY AUDIOMETRIC EVALUATION      Name:  Nu Lord  :  1975  Age:  44 y o  Date of Evaluation: 18    History: ear pain left ear, work related noise exposure   Reason for visit: Nu Lord is being seen today at the request of Dr Josse Vargas for an evaluation of hearing  EVALUATION:    Otoscopic Evaluation:   Right Ear: clear canal   Left Ear: clear canal     Tympanometry:  Normal middle ear functioning, bilaterally  (See tracings)     Audiogram Results:    *see attached audiogram    RECOMMENDATIONS:  1  Follow-up per Dr Josse Vargas  2  Follow-up audiologically per ENT or annually for monitoring purposes  PATIENT EDUCATION:   Discussed results and recommendations with Mr Quinten Lindo  Questions were addressed and the patient was encouraged to contact our department should concerns arise      Elba Mccord , CCC-A, [place]# [id number], Hearing Aid Dispenser, [place]# [id number]  Clinical Audiologist

## 2018-05-24 ENCOUNTER — HOSPITAL ENCOUNTER (OUTPATIENT)
Dept: RADIOLOGY | Facility: HOSPITAL | Age: 43
Discharge: HOME | End: 2018-05-24
Attending: OTOLARYNGOLOGY
Payer: COMMERCIAL

## 2018-05-24 DIAGNOSIS — IMO0001 LEFT ASYMMETRICAL SNHL: ICD-10-CM

## 2018-05-24 DIAGNOSIS — R20.0 LEFT FACIAL NUMBNESS: ICD-10-CM

## 2018-05-24 DIAGNOSIS — H92.02 OTALGIA OF LEFT EAR: ICD-10-CM

## 2018-05-24 PROCEDURE — A9585 GADOBUTROL INJECTION: HCPCS | Performed by: OTOLARYNGOLOGY

## 2018-05-24 PROCEDURE — 70553 MRI BRAIN STEM W/O & W/DYE: CPT

## 2018-05-24 RX ADMIN — GADOBUTROL 8 ML: 604.72 INJECTION INTRAVENOUS at 11:40

## 2018-09-26 ENCOUNTER — APPOINTMENT (OUTPATIENT)
Dept: RADIOLOGY | Facility: CLINIC | Age: 43
End: 2018-09-26
Payer: COMMERCIAL

## 2018-09-26 ENCOUNTER — OFFICE VISIT (OUTPATIENT)
Dept: OBGYN CLINIC | Facility: CLINIC | Age: 43
End: 2018-09-26
Payer: COMMERCIAL

## 2018-09-26 VITALS
WEIGHT: 173.4 LBS | HEIGHT: 68 IN | SYSTOLIC BLOOD PRESSURE: 123 MMHG | BODY MASS INDEX: 26.28 KG/M2 | DIASTOLIC BLOOD PRESSURE: 81 MMHG

## 2018-09-26 DIAGNOSIS — M75.82 ROTATOR CUFF TENDINITIS, LEFT: Primary | ICD-10-CM

## 2018-09-26 DIAGNOSIS — M25.512 LEFT SHOULDER PAIN, UNSPECIFIED CHRONICITY: ICD-10-CM

## 2018-09-26 PROCEDURE — 99203 OFFICE O/P NEW LOW 30 MIN: CPT | Performed by: ORTHOPAEDIC SURGERY

## 2018-09-26 PROCEDURE — 73030 X-RAY EXAM OF SHOULDER: CPT

## 2018-09-26 RX ORDER — OLMESARTAN MEDOXOMIL 20 MG/1
20 TABLET ORAL DAILY
COMMUNITY
End: 2018-10-25 | Stop reason: SDUPTHER

## 2018-09-26 NOTE — PROGRESS NOTES
Assessment/Plan:  1  Rotator cuff tendinitis, left  Ambulatory referral to Physical Therapy   2  Left shoulder pain, unspecified chronicity  XR shoulder 2+ vw left       Scribe Attestation    I,:   Aniket Whitfield am acting as a scribe while in the presence of the attending physician :        I,:   Claribel Jones MD personally performed the services described in this documentation    as scribed in my presence :              Jessica Ngo upon examination review of x-rays today demonstrates signs and symptoms consistent with left rotator cuff tendinitis  He does demonstrate good strength in all planes at does demonstrate normal range of motion  Jessica Ngo does have a history of a slap tear in his right shoulder that I repaired 11 years ago  He does demonstrate an equivocal [place]'s test today  I did discuss with him that there could be a chance that he has another slap tear in his left shoulder  However, his exam today is not fully conclusive for this injury  I would like Jessica Ngo to start out with conservative measures of treatment such as physical therapy  I provided prescription for this today  He is to go 2 to 3 times a week for 4-6 weeks  I noted the Jessica Ngo that should he fail to have relief of symptoms over the next 6 weeks he may follow up with me otherwise he may follow up with me on an as-needed basis  Subjective:   Brooke Pillai is a 44 y o  male who presents for initial evaluation of his left shoulder  Jessica Ngo denies any recent trauma however is experiencing intermittent and mild to moderate sharp pain about the anterior and lateral aspect of her shoulder  He notes he has been experiencing this pain for past few months  He states the pain is exacerbated with overhead activities or activities that involve loading the arm  He states that he has a history of a partial pectoralis tear this side from several years ago  However notes that he has not had any significant issues with this   He has not see a recent treatment for his shoulder up until this point  States that he is still able to continue with activities of daily living  He notes that on occasion he will have pain while sleeping if he were to lay onto his left side  However, he notes that he does not have difficulty falling asleep  Today denies any distal paresthesias  Review of Systems   Constitutional: Negative for chills, fever and unexpected weight change  HENT: Positive for sore throat  Negative for hearing loss and nosebleeds  Eyes: Negative for pain, redness and visual disturbance  Respiratory: Positive for cough  Negative for shortness of breath and wheezing  Cardiovascular: Negative for chest pain, palpitations and leg swelling  Gastrointestinal: Negative for abdominal pain, nausea and vomiting  Endocrine: Negative for polyphagia and polyuria  Frequent urination   Genitourinary: Negative for dysuria and hematuria  Musculoskeletal: Positive for arthralgias  See HPI   Skin: Negative for rash and wound  Neurological: Negative for dizziness, numbness and headaches  Psychiatric/Behavioral: Negative for decreased concentration and suicidal ideas  The patient is not nervous/anxious  Past Medical History:   Diagnosis Date    Hypertension     Vertigo        Past Surgical History:   Procedure Laterality Date    HERNIA REPAIR      KNEE SURGERY Right     AR LAP,APPENDECTOMY N/A 7/30/2016    Procedure: APPENDECTOMY LAPAROSCOPIC;  Surgeon: Johnson Montana MD;  Location: [facility];  Service: General    SHOULDER SURGERY Right        History reviewed  No pertinent family history  Social History     Occupational History    Not on file       Social History Main Topics    Smoking status: Never Smoker    Smokeless tobacco: Never Used    Alcohol use No    Drug use: No    Sexual activity: Not on file         Current Outpatient Prescriptions:     Ascorbic Acid (VITAMIN C) 100 MG tablet, Take 1 tablet by mouth daily, Disp: , Rfl:     cholecalciferol (VITAMIN D3) 1,000 units tablet, Take 1 tablet by mouth daily, Disp: , Rfl:     fluticasone (FLONASE) 50 mcg/act nasal spray, 2 sprays into each nostril daily, Disp: , Rfl:     loratadine (CLARITIN) 10 mg tablet, Take 1 tablet by mouth daily as needed, Disp: , Rfl:     olmesartan (BENICAR) 20 mg tablet, Take 20 mg by mouth daily, Disp: , Rfl:     Omega-3 Fatty Acids (FISH OIL) 645 MG CAPS, Take 1 capsule by mouth daily, Disp: , Rfl:     vitamin A 10,000 units capsule, Take 1 tablet by mouth daily, Disp: , Rfl:     VITAMIN B COMPLEX-C PO, Take 1 capsule by mouth daily, Disp: , Rfl:     Vitamin E 400 units TABS, Take 1 tablet by mouth daily, Disp: , Rfl:     Allergies   Allergen Reactions    Bee Venom     Flomax [Tamsulosin Hcl] Other (See Comments)     hypotension    Lisinopril     Pollen Extract     Tamsulosin Other (See Comments)     hypotension  hypotension       Objective:  Vitals:    09/26/18 0655   BP: 123/81       Left Shoulder Exam     Tenderness   The patient is experiencing tenderness in the biceps tendon  Range of Motion   Active Abduction: 170   Passive Abduction: 170   Extension: 60   Forward Flexion: 180   External Rotation: 80   Internal Rotation 0 degrees: L2     Muscle Strength   Abduction: 5/5   Internal Rotation: 5/5   External Rotation: 5/5   Supraspinatus: 5/5   Subscapularis: 5/5   Biceps: 5/5     Tests   Hawkin's test: positive  Impingement: positive    Other   Erythema: absent  Scars: absent  Sensation: normal  Pulse: present     Comments:  O Peterson's test:  Equivocal  Apprehension test:  Negative  I am able to hook his pectoralis tendon indicating that there is no tear  Physical Exam   Constitutional: He is oriented to person, place, and time  He appears well-developed and well-nourished  HENT:   Head: Normocephalic and atraumatic  Eyes: Conjunctivae are normal    Neck: Normal range of motion  Cardiovascular: Normal rate  Pulmonary/Chest: Effort normal    Musculoskeletal:   As noted in HPI   Neurological: He is alert and oriented to person, place, and time  Skin: Skin is warm and dry  Psychiatric: He has a normal mood and affect  His behavior is normal  Judgment and thought content normal    Nursing note and vitals reviewed  I have personally reviewed pertinent films in PACS and my interpretation is as follows:    X-rays of the left shoulder demonstrates no acute fracture other osseous abnormalities  No signs of osteoarthritis

## 2018-09-27 ENCOUNTER — EVALUATION (OUTPATIENT)
Dept: PHYSICAL THERAPY | Facility: CLINIC | Age: 43
End: 2018-09-27
Payer: COMMERCIAL

## 2018-09-27 DIAGNOSIS — M75.82 ROTATOR CUFF TENDINITIS, LEFT: Primary | ICD-10-CM

## 2018-09-27 PROCEDURE — G8984 CARRY CURRENT STATUS: HCPCS | Performed by: PHYSICAL THERAPIST

## 2018-09-27 PROCEDURE — 97162 PT EVAL MOD COMPLEX 30 MIN: CPT | Performed by: PHYSICAL THERAPIST

## 2018-09-27 PROCEDURE — G8985 CARRY GOAL STATUS: HCPCS | Performed by: PHYSICAL THERAPIST

## 2018-09-27 NOTE — PROGRESS NOTES
PT Evaluation     Today's date: 2018  Patient name: Claude Tinsley  : 1975  MRN: [id number]  Referring provider: Gold Levi MD  Dx:   Encounter Diagnosis     ICD-10-CM    1  Rotator cuff tendinitis, left M75 82 Ambulatory referral to Physical Therapy       Start Time: 1400  Stop Time: 1500  Total time in clinic (min): 60 minutes    Assessment  Impairments: abnormal muscle firing, abnormal muscle tone, abnormal or restricted ROM, abnormal movement, activity intolerance, impaired balance, impaired physical strength, pain with function, poor posture  and poor body mechanics  Functional limitations: decreased overhead activity, decreased ability to peach across bodyand behind back for ADL, decreased ability to lift at gym  Assessment details: Claude Tinsley is a 44 y o  male who presents with: Rotator cuff tendinitis, left  (primary encounter diagnosis)  Pt presents with pain, decreased UE range of motion, decreased UE strength, impaired function, decreased activity tolerance and fair posture  Pt presents with these impairments and would benefit from skilled physical therapy to address these impairments in order to maximize their function    Understanding of Dx/Px/POC: good   Prognosis: good    Goals  Short term goals:  (to be met in 4 weeks)  + paitent will report a 50% improvement in function   + Patient will be independent in basic HEP   +Patient will be able to lift arms overhead with pain less than 3/10 L shoulder  +Patient will be able to put seat belt on with pain less than 3/10 L shoulder    Long term goals: (to be met in 8 weeks)    + Patient will report 85 % improvement improvement in symptoms with ADL's  + Patient will have full range of motion left shoulder painfree  + Patient will have pain level 0/10 L shoulder with sleeping  + Patient will increase FOTO score to 74 (10 sessions)  + Patient will demonstrate appropriate scapulohumeral rhythm with reaching overhead   + patient will demonstrate functional reaching without compensatory patterns    + Patient will be independent in a comprehensive home exercise program           Plan  Patient would benefit from: skilled physical therapy  Planned modality interventions: cryotherapy  Planned therapy interventions: abdominal trunk stabilization, activity modification, ADL retraining, ADL training, body mechanics training, breathing training, flexibility, functional ROM exercises, home exercise program, graded exercise, graded activity, therapeutic training, therapeutic exercise, therapeutic activities, stretching, strengthening, postural training, patient education, neuromuscular re-education, manual therapy and joint mobilization  Frequency: 2x week  Duration in weeks: 8  Treatment plan discussed with: patient        Subjective Evaluation    History of Present Illness  Mechanism of injury: Pt reporting he started to note pain L shoulder approx in 2018  Pt exercises at [employer] 3-4x/week  L PEC tear reporting approx 3 years ago with no surgical intervention  Pt lifting overhead and reaching behind back, reaching forward, reaching across body upward    Not a recurrent problem   Quality of life: good    Pain  Current pain rating: 3  At best pain ratin  At worst pain ratin  Location: L  shoulder and radiating pain  Quality: dull ache and throbbing  Relieving factors: relaxation, change in position and ice  Aggravating factors: overhead activity and lifting  Progression: worsening    Social Support    Employment status: working ()  Hand dominance: right  Exercise history: Strength training 3-4x /week and cardio      Diagnostic Tests  X-ray: abnormal  Treatments  No previous or current treatments  Patient Goals  Patient goals for therapy: decreased pain, increased motion, return to sport/leisure activities, independence with ADLs/IADLs and increased strength  Patient goal: "to try and alleviate the gym"        Objective     Palpation   Left   Tenderness of the anterior deltoid, biceps, infraspinatus, latissimus, middle trapezius, pectoralis minor and posterior deltoid  Cervical/Thoracic Screen   Cervical range of motion within normal limits with the following exceptions: Cervical ROM WFL with pain at end rnge    Neurological Testing     Additional Neurological Details  C/o pain radiating L UE  No change in L shoulder pain with cervical motion  B UT tightness noted     Active Range of Motion   Left Shoulder   Flexion: 90 degrees with pain  Abduction: 90 degrees with pain    Right Shoulder   Flexion: 170 degrees   Abduction: 170 degrees     Additional Active Range of Motion Details  Functional  IR (C7 to tip of thumb) 23 cm L and 19 cm L     Passive Range of Motion   Left Shoulder   Flexion: 150 degrees WFL  Abduction: 140 degrees WFL    Right Shoulder   Normal passive range of motion    Scapular Mobility     Additional Scapular Mobility Details  Poor scapular stabilization noted with UE movements  Strength/Myotome Testing     Left Shoulder     Planes of Motion   Flexion: 4-   Extension: 4-   Abduction: 4-   External rotation at 90°: 4-     Right Shoulder     Planes of Motion   Flexion: 4-   Extension: 4-   Abduction: 4-   External rotation at 90°: 4-     Tests     Left Shoulder   Positive empty can, Neer's and painful arc       General Comments     Shoulder Comments   Pt weak and deconditioned B scapular stabilizers which is contributing to L shoulder pain      Flowsheet Rows      Most Recent Value   PT/OT G-Codes   Current Score  57   Projected Score  74   Assessment Type  Evaluation   G code set  Carrying, Moving & Handling Objects   Carrying, Moving and Handling Objects Current Status ()  CK   Carrying, Moving and Handling Objects Goal Status ()  CJ          Precautions: R labral shoulder tear 2007, HBP, vertigo, TMJ, L shoulder calcific tendonitis    Daily Treatment Diary     Manual 9/27/18            PROM L shoulder             STM L shoulder                                                        Exercise Diary  9/27/18            scap retraction TB             Shoulder ext TB             Nu step arms only             Wall climbs with scap retraction x10 hold 10            cervical SB stretch x10 hold 10            Prone scap flexion NV            pec stretch x5 hold 10                                                                                                                                                                                         Modalities  9/27/18            CP

## 2018-10-02 ENCOUNTER — OFFICE VISIT (OUTPATIENT)
Dept: PHYSICAL THERAPY | Facility: CLINIC | Age: 43
End: 2018-10-02
Payer: COMMERCIAL

## 2018-10-02 DIAGNOSIS — M75.82 ROTATOR CUFF TENDINITIS, LEFT: Primary | ICD-10-CM

## 2018-10-02 PROCEDURE — 97140 MANUAL THERAPY 1/> REGIONS: CPT | Performed by: PHYSICAL THERAPIST

## 2018-10-02 PROCEDURE — 97110 THERAPEUTIC EXERCISES: CPT | Performed by: PHYSICAL THERAPIST

## 2018-10-02 NOTE — PROGRESS NOTES
Daily Note     Today's date: 10/2/2018  Patient name: Katharina Solders  : 1975  MRN: [id number]  Referring provider: Coreen Stern MD  Dx:   Encounter Diagnosis     ICD-10-CM    1  Rotator cuff tendinitis, left M75 82          Subjective: "I am feeling some pain in my L shoulder with the one stretch for my neck muscles that you gave me for homework  But overall the exercises felt good" 2/10 L shoulder       Objective: See treatment diary below  Precautions: R labral shoulder tear 2007, HBP, vertigo, TMJ, L shoulder calcific tendonitis    Daily Treatment Diary     Manual  9/27/18 10/2/18           PROM L shoulder  x5 min           STM L shoulder  x2 min           Median nerve glides  x2 min           ARROM L shoulder  x1 min abd/ D1/D2           Total   10 min               Exercise Diary  9/27/18 10/2/18           scap retraction TB  Blue TB 2x10 hold 5           Shoulder ext TB  Blue TB 2x10 hold 5           Nu step arms only seat10  Arms 10   Level 4 ,  25 miles 7 min 24 sec           Wall climbs with scap retraction x10 hold 10 x20 hold 10           cervical SB stretch x10 hold 10 x10 hold 10           Prone scap flexion NV 2x10 on blue ball           pec stretch x5 hold 10 x10 hold 20 sec           [place] rows   40# x10 , 30# x10            Lat pull down   30# x10, 40# x10           Sos shoulder abd  x10 hold 10 ea                                                                                                                                                 Modalities  9/27/18 10/2/18           CP  x10 hold 10                                             Assessment: Tolerated treatment well  Patient demonstrated fatigue post treatment  Pt reported some minor pain L shoulder with scap retraction therex but overall felt better with therex and stretches  1/10 pain L shoulder      Plan: Continue per plan of care     Add chest press NV

## 2018-10-04 ENCOUNTER — OFFICE VISIT (OUTPATIENT)
Dept: PHYSICAL THERAPY | Facility: CLINIC | Age: 43
End: 2018-10-04
Payer: COMMERCIAL

## 2018-10-04 DIAGNOSIS — M75.82 ROTATOR CUFF TENDINITIS, LEFT: Primary | ICD-10-CM

## 2018-10-04 PROCEDURE — 97110 THERAPEUTIC EXERCISES: CPT | Performed by: PHYSICAL THERAPIST

## 2018-10-04 PROCEDURE — 97140 MANUAL THERAPY 1/> REGIONS: CPT | Performed by: PHYSICAL THERAPIST

## 2018-10-04 NOTE — PROGRESS NOTES
Daily Note     Today's date: 10/4/2018  Patient name: Noman Reed  : 1975  MRN: [id number]  Referring provider: Markell Beaver MD  Dx:   Encounter Diagnosis     ICD-10-CM    1  Rotator cuff tendinitis, left M75 82          Subjective: "I am a little sore from the exercises the other day"  2/10 L shoulder       Objective: See treatment diary below  Precautions: R labral shoulder tear 2007, HBP, vertigo, TMJ, L shoulder calcific tendonitis    Daily Treatment Diary     Manual  9/27/18 10/2/18 10/4/18          PROM L shoulder  x5 min x5 min          STM L shoulder  x2 min x2  min          Median nerve glides  x2 min x2 min          ARROM L shoulder  x1 min abd/ D1/D2 x1 min abd/D1D2          Total   10 min 10 min              Exercise Diary  9/27/18 10/2/18 10/4/18          scap retraction TB  Blue TB 2x10 hold 5 Blue TB 2x10 hold 5          Shoulder ext TB  Blue TB 2x10 hold 5 Blue TB 2x10 hold 5          Nu step arms only seat10  Arms 10   Level 4 ,  25 miles 7 min 24 sec Level 4  25 miles 6 min          Wall climbs with scap retraction x10 hold 10 x20 hold 10 x20 hold 10          cervical SB stretch x10 hold 10 x10 hold 10 x10 hold 10          Prone scap flexion NV 2x10 on blue ball 2x10 on blue ball          pec stretch x5 hold 10 x10 hold 20 sec x10 hold 20 sec          [place] rows   40# x10 , 30# x10  40# x20          Lat pull down   30# x10, 40# x10 40# x10, x50 x 10          Sos shoulder abd  x10 hold 10 ea x10 hold 10 ea          Chest press paramount   50# 2x10 hold 5          Prone shoulder abd    NV                                                                                                                      Modalities  9/27/18 10/2/18 10/4/18          CP  x10 hold 10 x10 hold 10                                            Assessment: Tolerated treatment well  Patient demonstrated fatigue post treatment   Pt reported some minor pain L shoulder with scap retraction therex but overall felt better with therex and stretches  1/10 pain L shoulder      Plan: Continue per plan of care   Continue with prone therex NV

## 2018-10-10 ENCOUNTER — OFFICE VISIT (OUTPATIENT)
Dept: PHYSICAL THERAPY | Facility: CLINIC | Age: 43
End: 2018-10-10
Payer: COMMERCIAL

## 2018-10-10 DIAGNOSIS — M75.82 ROTATOR CUFF TENDINITIS, LEFT: Primary | ICD-10-CM

## 2018-10-10 PROCEDURE — 97110 THERAPEUTIC EXERCISES: CPT | Performed by: PHYSICAL THERAPIST

## 2018-10-10 PROCEDURE — 97140 MANUAL THERAPY 1/> REGIONS: CPT | Performed by: PHYSICAL THERAPIST

## 2018-10-10 NOTE — PROGRESS NOTES
Daily Note     Today's date: 10/10/2018  Patient name: Cassie Montes  : 1975  MRN: [id number]  Referring provider: Patricia Harrington MD  Dx:   Encounter Diagnosis     ICD-10-CM    1  Rotator cuff tendinitis, left M75 82          Subjective: "I am a little sore from the exercises the other day"    But it actually felt less sore after the day I was here   "  2/10 L shoulder       Objective: See treatment diary below  Precautions: R labral shoulder tear 2007, HBP, vertigo, TMJ, L shoulder calcific tendonitis    Daily Treatment Diary     Manual  9/27/18 10/2/18 10/4/18 10/10/18         PROM L shoulder  x5 min x5 min x5 min         STM L shoulder  x2 min x2  min x2 min         Median nerve glides  x2 min x2 min x2 min         ARROM L shoulder  x1 min abd/ D1/D2 x1 min abd/D1D2 x1 min abd/D1/D2         Total   10 min 10 min 10 min             Exercise Diary  9/27/18 10/2/18 10/4/18 10/10/18         scap retraction TB  Blue TB 2x10 hold 5 Blue TB 2x10 hold 5 Blue TB 2x10 hold 5         Shoulder ext TB  Blue TB 2x10 hold 5 Blue TB 2x10 hold 5 Blue TB 2x10 hold 5         Nu step arms only seat10  Arms 10   Level 4 ,  25 miles 7 min 24 sec Level 4  25 miles 6 min Level 4  50 miles x10 min         Wall climbs with scap retraction x10 hold 10 x20 hold 10 x20 hold 10 x20 hold 10         cervical SB stretch x10 hold 10 x10 hold 10 x10 hold 10 x10 hold 10         Prone scap flexion NV 2x10 on blue ball 2x10 on blue ball x10 hold 5 1#         pec stretch x5 hold 10 x10 hold 20 sec x10 hold 20 sec x10 hold 20 sec         [place] rows   40# x10 , 30# x10  40# x20 40# 2x10          Lat pull down   30# x10, 40# x10 40# x10, x50 x 10 40# x10, 50# x10         Sos shoulder abd  x10 hold 10 ea x10 hold 10 ea x10 hold 10         Chest press paramount   50# 2x10 hold 5 50# 2x10 hold 5         Prone shoulder abd    NV NV Modalities  9/27/18 10/2/18 10/4/18 10/10/18         CP  x10 hold 10 x10 hold 10 x10 hold 10                                           Assessment: Tolerated treatment well  Patient demonstrated fatigue post treatment  Pt reported some minor pain L shoulder with scap retraction therex but overall felt better with therex and stretches  1/10 pain L shoulder  Pt noting increase in strength L shoulder with therex today  Plan: Continue per plan of care   Continue with prone therex NV

## 2018-10-11 ENCOUNTER — OFFICE VISIT (OUTPATIENT)
Dept: PHYSICAL THERAPY | Facility: CLINIC | Age: 43
End: 2018-10-11
Payer: COMMERCIAL

## 2018-10-11 DIAGNOSIS — M75.82 ROTATOR CUFF TENDINITIS, LEFT: Primary | ICD-10-CM

## 2018-10-11 PROCEDURE — 97140 MANUAL THERAPY 1/> REGIONS: CPT | Performed by: PHYSICAL THERAPIST

## 2018-10-11 PROCEDURE — 97110 THERAPEUTIC EXERCISES: CPT | Performed by: PHYSICAL THERAPIST

## 2018-10-11 NOTE — PROGRESS NOTES
Daily Note     Today's date: 10/11/2018  Patient name: Richard Burgos  : 1975  MRN: [id number]  Referring provider: Kaylan Narayanan MD  Dx:   Encounter Diagnosis     ICD-10-CM    1  Rotator cuff tendinitis, left M75 82          Subjective: "I feel pretty good today   "  2/10 L shoulder       Objective: See treatment diary below  Precautions: R labral shoulder tear 2007, HBP, vertigo, TMJ, L shoulder calcific tendonitis    Daily Treatment Diary     Manual  9/27/18 10/2/18 10/4/18 10/10/18 10/11/18        PROM L shoulder  x5 min x5 min x5 min x5 min        STM L shoulder  x2 min x2  min x2 min x2 min         Median nerve glides  x2 min x2 min x2 min x2 min        ARROM L shoulder  x1 min abd/ D1/D2 x1 min abd/D1D2 x1 min abd/D1/D2 x1 min abd/D1/D2        Total   10 min 10 min 10 min 10 min            Exercise Diary  9/27/18 10/2/18 10/4/18 10/10/18 10/11/18        scap retraction TB  Blue TB 2x10 hold 5 Blue TB 2x10 hold 5 Blue TB 2x10 hold 5 Blue TB 2x10 hold 5        Shoulder ext TB  Blue TB 2x10 hold 5 Blue TB 2x10 hold 5 Blue TB 2x10 hold 5 Blue TB 2x10 hold 5        Nu step arms only seat10  Arms 10   Level 4 ,  25 miles 7 min 24 sec Level 4  25 miles 6 min Level 4  50 miles x10 min Level 5  50 miles         Wall climbs with scap retraction x10 hold 10 x20 hold 10 x20 hold 10 x20 hold 10 x20 hold 10        cervical SB stretch x10 hold 10 x10 hold 10 x10 hold 10 x10 hold 10 x10 hold 10        Prone scap flexion NV 2x10 on blue ball 2x10 on blue ball x10 hold 5 1# x10 hold 5 1#        pec stretch x5 hold 10 x10 hold 20 sec x10 hold 20 sec x10 hold 20 sec x10 hold 10        [place] rows   40# x10 , 30# x10  40# x20 40# 2x10  50# 2x10         Lat pull down   30# x10, 40# x10 40# x10, x50 x 10 40# x10, 50# x10 -        Sos shoulder abd  x10 hold 10 ea x10 hold 10 ea x10 hold 10 x10 hold 10        Chest press paramount   50# 2x10 hold 5 50# 2x10 hold 5 -        Prone shoulder abd    NV NV 1# x10 on ball        Supine cane abd AAROM     x10 hold 10                                                                                                       Modalities  9/27/18 10/2/18 10/4/18 10/10/18 10/11/18        CP  x10 hold 10 x10 hold 10 x10 hold 10 x10 hold 10                                          Assessment: Tolerated treatment well  Patient demonstrated fatigue post treatment  Pt reported some minor pain L shoulder with scap retraction therex but overall felt better with therex and stretches  1/10 pain L shoulder  Pt noting increase in strength L shoulder with therex today  Plan: Continue per plan of care  Continue with prone therex NV  Increase reps as tolerated

## 2018-10-16 ENCOUNTER — OFFICE VISIT (OUTPATIENT)
Dept: PHYSICAL THERAPY | Facility: CLINIC | Age: 43
End: 2018-10-16
Payer: COMMERCIAL

## 2018-10-16 DIAGNOSIS — M75.82 ROTATOR CUFF TENDINITIS, LEFT: Primary | ICD-10-CM

## 2018-10-16 PROCEDURE — 97140 MANUAL THERAPY 1/> REGIONS: CPT | Performed by: PHYSICAL THERAPIST

## 2018-10-16 PROCEDURE — 97110 THERAPEUTIC EXERCISES: CPT | Performed by: PHYSICAL THERAPIST

## 2018-10-16 NOTE — PROGRESS NOTES
Daily Note     Today's date: 10/16/2018  Patient name: Jm Draper  : 1975  MRN: [id number]  Referring provider: Shalini Augustin MD  Dx:   Encounter Diagnosis     ICD-10-CM    1  Rotator cuff tendinitis, left M75 82          Subjective: "I feel pretty good today   "  2/10 L shoulder       Objective: See treatment diary below  Precautions: R labral shoulder tear 2007, HBP, vertigo, TMJ, L shoulder calcific tendonitis    Daily Treatment Diary     Manual  9/27/18 10/2/18 10/4/18 10/10/18 10/11/18 10/16/18       PROM L shoulder  x5 min x5 min x5 min x5 min x5 min       STM L shoulder  x2 min x2  min x2 min x2 min  x2 min       Median nerve glides  x2 min x2 min x2 min x2 min x2 in       ARROM L shoulder  x1 min abd/ D1/D2 x1 min abd/D1D2 x1 min abd/D1/D2 x1 min abd/D1/D2 x1 min abd/D1/D2       Total   10 min 10 min 10 min 10 min 10 min           Exercise Diary  9/27/18 10/2/18 10/4/18 10/10/18 10/11/18 10/16/18       scap retraction TB  Blue TB 2x10 hold 5 Blue TB 2x10 hold 5 Blue TB 2x10 hold 5 Blue TB 2x10 hold 5 Blue TB 2x10 hold 5       Shoulder ext TB  Blue TB 2x10 hold 5 Blue TB 2x10 hold 5 Blue TB 2x10 hold 5 Blue TB 2x10 hold 5 Blue TB 2x10 hold 5       Nu step arms only seat10  Arms 10   Level 4 ,  25 miles 7 min 24 sec Level 4  25 miles 6 min Level 4  50 miles x10 min Level 5  50 miles  Level 5  50 miles 12 min       Wall climbs with scap retraction x10 hold 10 x20 hold 10 x20 hold 10 x20 hold 10 x20 hold 10 x20 hold 10       cervical SB stretch x10 hold 10 x10 hold 10 x10 hold 10 x10 hold 10 x10 hold 10 x10 hold 10       Prone scap flexion NV 2x10 on blue ball 2x10 on blue ball x10 hold 5 1# x10 hold 5 1# 10 hold 5 1#       pec stretch x5 hold 10 x10 hold 20 sec x10 hold 20 sec x10 hold 20 sec x10 hold 10 2x10 hold 10       [place] rows   40# x10 , 30# x10  40# x20 40# 2x10  50# 2x10  60# 2x10       Lat pull down   30# x10, 40# x10 40# x10, x50 x 10 40# x10, 50# x10 - 60# 2x10       Sos shoulder abd  x10 hold 10 ea x10 hold 10 ea x10 hold 10 x10 hold 10 x10 hold 10       Chest press paramount   50# 2x10 hold 5 50# 2x10 hold 5 - 50# 2x10 hold 5       Prone shoulder abd    NV NV 1# x10 on ball 1# on ball 2x10       Supine cane abd AAROM     x10 hold 10 2x10 hold 5       Lat raises       5# 2x10       Ant raises       5# 2x6                                                                            Modalities  9/27/18 10/2/18 10/4/18 10/10/18 10/11/18 10/16       CP  x10 hold 10 x10 hold 10 x10 hold 10 x10 hold 10 x10 hold 10                                         Assessment: Tolerated treatment well  Patient demonstrated fatigue post treatment  Pt reported some minor pain L shoulder with scap retraction therex but overall felt better with therex and stretches  1/10 pain L shoulder  Pt noting increase in strength L shoulder with therex today  Difficulty with lat raises and ant raises as tolerated  Plan: Continue per plan of care  Continue with prone therex NV  Increase reps as tolerated

## 2018-10-18 ENCOUNTER — OFFICE VISIT (OUTPATIENT)
Dept: PHYSICAL THERAPY | Facility: CLINIC | Age: 43
End: 2018-10-18
Payer: COMMERCIAL

## 2018-10-18 DIAGNOSIS — M75.82 ROTATOR CUFF TENDINITIS, LEFT: Primary | ICD-10-CM

## 2018-10-18 PROCEDURE — G8985 CARRY GOAL STATUS: HCPCS | Performed by: PHYSICAL THERAPIST

## 2018-10-18 PROCEDURE — 97110 THERAPEUTIC EXERCISES: CPT | Performed by: PHYSICAL THERAPIST

## 2018-10-18 PROCEDURE — 97140 MANUAL THERAPY 1/> REGIONS: CPT | Performed by: PHYSICAL THERAPIST

## 2018-10-18 PROCEDURE — G8984 CARRY CURRENT STATUS: HCPCS | Performed by: PHYSICAL THERAPIST

## 2018-10-18 NOTE — PROGRESS NOTES
Daily Note     Today's date: 10/18/2018  Patient name: Dalia Gomes  : 1975  MRN: [id number]  Referring provider: Judy Ley MD  Dx:   Encounter Diagnosis     ICD-10-CM    1  Rotator cuff tendinitis, left M75 82          Subjective: "I feel pretty good today   "  No pain right now in my shoulder    Objective: See treatment diary below    Precautions: R labral shoulder tear 2007, HBP, vertigo, TMJ, L shoulder calcific tendonitis    Daily Treatment Diary     Manual  9/27/18 10/2/18 10/4/18 10/10/18 10/11/18 10/16/18 10/18/18 Re-eval      PROM L shoulder  x5 min x5 min x5 min x5 min x5 min x5 min      STM L shoulder  x2 min x2  min x2 min x2 min  x2 min x5 min      Median nerve glides  x2 min x2 min x2 min x2 min x2 in x3 min      ARROM L shoulder  x1 min abd/ D1/D2 x1 min abd/D1D2 x1 min abd/D1/D2 x1 min abd/D1/D2 x1 min abd/D1/D2 x2 min abd/D1/D2       Total   10 min 10 min 10 min 10 min 10 min 15 min          Exercise Diary  9/27/18 10/2/18 10/4/18 10/10/18 10/11/18 10/16/18 10/18/18      scap retraction TB  Blue TB 2x10 hold 5 Blue TB 2x10 hold 5 Blue TB 2x10 hold 5 Blue TB 2x10 hold 5 Blue TB 2x10 hold 5 Blue TB 2x10 hold 5      Shoulder ext TB  Blue TB 2x10 hold 5 Blue TB 2x10 hold 5 Blue TB 2x10 hold 5 Blue TB 2x10 hold 5 Blue TB 2x10 hold 5 Blue TB 2x10 hold 5      Nu step arms only seat10  Arms 10   Level 4 ,  25 miles 7 min 24 sec Level 4  25 miles 6 min Level 4  50 miles x10 min Level 5  50 miles  Level 5  50 miles 12 min Level 6  50 10 min 54 sec      Wall climbs with scap retraction x10 hold 10 x20 hold 10 x20 hold 10 x20 hold 10 x20 hold 10 x20 hold 10 x20 hold 10      cervical SB stretch x10 hold 10 x10 hold 10 x10 hold 10 x10 hold 10 x10 hold 10 x10 hold 10 x10 hold 10      Prone scap flexion NV 2x10 on blue ball 2x10 on blue ball x10 hold 5 1# x10 hold 5 1# 10 hold 5 1# x10 hold 5 2#      pec stretch x5 hold 10 x10 hold 20 sec x10 hold 20 sec x10 hold 20 sec x10 hold 10 2x10 hold 10 2x10 hold 10      [place] rows   40# x10 , 30# x10  40# x20 40# 2x10  50# 2x10  60# 2x10 60# 3x10      Lat pull down   30# x10, 40# x10 40# x10, x50 x 10 40# x10, 50# x10 - 60# 2x10 -      Sos shoulder abd  x10 hold 10 ea x10 hold 10 ea x10 hold 10 x10 hold 10 x10 hold 10 x10 hold 10      Chest press paramount   50# 2x10 hold 5 50# 2x10 hold 5 - 50# 2x10 hold 5 60# 3x10      Prone shoulder abd    NV NV 1# x10 on ball 1# on ball 2x10 2# on ball 2x10      Supine cane abd AAROM     x10 hold 10 2x10 hold 5 2x10 hold 5      Lat raises       5# 2x10 4# x10      Ant raises       5# 2x6 4# x10             Planks NV                                                              Modalities  9/27/18 10/2/18 10/4/18 10/10/18 10/11/18 10/16 10/18/18      CP  x10 hold 10 x10 hold 10 x10 hold 10 x10 hold 10 x10 hold 10 x10 hold 10                                        Assessment: Tolerated treatment well  Patient demonstrated fatigue post treatment  Pt reported some minor pain L shoulder with manual ARROM into flexion from extended position (any motion that requires use of pec muscle causes increase pain L shoulder) but overall felt better with therex and stretches  0/10 pain L shoulder  Pt noting increase in strength L shoulder with therex today  Less difficulty with lat raises and ant raises today     Plan: Continue per plan of care  Continue with prone therex NV  Increase reps as tolerated  Re-eval NV   Planks NV

## 2018-10-23 ENCOUNTER — OFFICE VISIT (OUTPATIENT)
Dept: PHYSICAL THERAPY | Facility: CLINIC | Age: 43
End: 2018-10-23
Payer: COMMERCIAL

## 2018-10-23 DIAGNOSIS — M75.82 ROTATOR CUFF TENDINITIS, LEFT: Primary | ICD-10-CM

## 2018-10-23 PROCEDURE — 97110 THERAPEUTIC EXERCISES: CPT

## 2018-10-23 PROCEDURE — 97140 MANUAL THERAPY 1/> REGIONS: CPT

## 2018-10-23 PROCEDURE — G8981 BODY POS CURRENT STATUS: HCPCS

## 2018-10-23 PROCEDURE — G8982 BODY POS GOAL STATUS: HCPCS

## 2018-10-23 NOTE — PROGRESS NOTES
PT Re-Evaluation     Today's date: 10/23/2018  Patient name: Heriberto Ramirez  : 1975  MRN: [id number]  Referring provider: vYes Castaneda MD  Dx:   Encounter Diagnosis     ICD-10-CM    1  Rotator cuff tendinitis, left M75 82        Start Time: 830  Stop Time: 940  Total time in clinic (min): 70 minutes    Assessment  Impairments: abnormal muscle firing, abnormal muscle tone, abnormal or restricted ROM, abnormal movement, activity intolerance, impaired balance, impaired physical strength, pain with function, poor posture  and poor body mechanics  Functional limitations: decreased overhead activity, decreased ability to peach across bodyand behind back for ADL, decreased ability to lift at gym  Assessment details: Heriberto Ramirez is a 44 y o  male who presents with: Rotator cuff tendinitis, left  (primary encounter diagnosis)  Pt  Continues to present with pain that worsens with activity and continues to reach 6/10 at worst  Improvements made in UE range of motion but continues to have limitations with painful end range  Reductions UE strength compared to R side but has made improvements compared to IE  Iimpaired function, decreased activity tolerance and fair posture  Pt presents with these impairments and would benefit from skilled physical therapy to address these impairments in order to maximize their function    Understanding of Dx/Px/POC: good   Prognosis: good    Goals  Short term goals:  (to be met in 4 weeks)  + paitent will report a 50% improvement in function-NOT MET  + Patient will be independent in basic HEP-NOT MET  +Patient will be able to lift arms overhead with pain less than 3/10 L shoulder- NOT MET  +Patient will be able to put seat belt on with pain less than 3/10 L shoulder-NOT MET    Long term goals: (to be met in 8 weeks)    + Patient will report 85 % improvement improvement in symptoms with ADL's-NOT MET  + Patient will have full range of motion left shoulder painfree-NOT MET  + Patient will have pain level 0/10 L shoulder with sleeping-NOT MET  + Patient will increase FOTO score to 74 (10 sessions)-  + Patient will demonstrate appropriate scapulohumeral rhythm with reaching overhead   + patient will demonstrate functional reaching without compensatory patterns    + Patient will be independent in a comprehensive home exercise program           Plan  Patient would benefit from: skilled physical therapy  Planned modality interventions: cryotherapy  Planned therapy interventions: abdominal trunk stabilization, activity modification, ADL retraining, ADL training, body mechanics training, breathing training, flexibility, functional ROM exercises, home exercise program, graded exercise, graded activity, therapeutic training, therapeutic exercise, therapeutic activities, stretching, strengthening, postural training, patient education, neuromuscular re-education, manual therapy and joint mobilization  Frequency: 2x week  Duration in weeks: 8  Treatment plan discussed with: patient        Subjective Evaluation    History of Present Illness  Mechanism of injury: Pt reporting he started to note pain L shoulder approx in 2018  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  L PEC tear reporting approx 3 years ago with no surgical intervention  Pt lifting overhead and reaching behind back, reaching forward, reaching across body upward  10/23/18  Patient reports that there is still constant pain and gets worse as he uses it  It feels like it has improved but still reaches 6/10 at worst after activity with L UE     Not a recurrent problem   Quality of life: good    Pain  Current pain ratin  At best pain ratin  At worst pain ratin  Location: L  shoulder and radiating pain  Quality: dull ache and throbbing  Relieving factors: relaxation, change in position and ice  Aggravating factors: overhead activity and lifting  Progression: worsening    Social Support    Employment status: working ()  Hand dominance: right  Exercise history: Strength training 3-4x /week and cardio      Diagnostic Tests  X-ray: abnormal  Treatments  No previous or current treatments  Patient Goals  Patient goals for therapy: decreased pain, increased motion, return to sport/leisure activities, independence with ADLs/IADLs and increased strength  Patient goal: "to try and alleviate the gym"        Objective     Palpation   Left   Tenderness of the anterior deltoid, biceps, infraspinatus, latissimus, middle trapezius, pectoralis minor and posterior deltoid  Cervical/Thoracic Screen   Cervical range of motion within normal limits with the following exceptions: Cervical ROM WFL with pain at end rnge    Neurological Testing     Additional Neurological Details  C/o pain radiating L UE  No change in L shoulder pain with cervical motion  B UT tightness noted     Active Range of Motion   Left Shoulder   Flexion: 150 degrees with pain  Abduction: 120 degrees with pain  External rotation BTH: C7   Internal rotation BTB: T8     Right Shoulder   Flexion: 170 degrees   Abduction: 170 degrees   External rotation BTH: T2   Internal rotation BTB: T8     Additional Active Range of Motion Details  9/27/18:  Functional  IR (C7 to tip of thumb) 23 cm L and 19 cm L     Passive Range of Motion   Left Shoulder   Flexion: 140 degrees with pain  Abduction: 140 degrees with pain    Right Shoulder   Normal passive range of motion    Scapular Mobility     Additional Scapular Mobility Details  Poor scapular stabilization noted with UE movements  Strength/Myotome Testing     Left Shoulder     Planes of Motion   Flexion: 4-   Extension: 4-   Abduction: 4-   External rotation at 90°: 4-   Internal rotation at 0°: 4     Right Shoulder     Planes of Motion   Flexion: 5   Extension: 5   Abduction: 5   External rotation at 90°: 5   Internal rotation at 90°: 5     Tests     Left Shoulder   Positive empty can, Neer's and painful arc  General Comments     Shoulder Comments   Pt weak and deconditioned B scapular stabilizers which is contributing to L shoulder pain      Flowsheet Rows      Most Recent Value   PT/OT G-Codes   Current Score  65   Projected Score  74   Assessment Type  Re-evaluation   G code set  Changing & Maintaining Body Position   Changing and Maintaining Body Position Current Status ()  CJ   Changing and Maintaining Body Position Goal Status ()  CJ          Precautions: R labral shoulder tear 2007, HBP, vertigo, TMJ, L shoulder calcific tendonitis    Daily Treatment Diary     Manual  9/27/18 10/23/18           PROM L shoulder  PROM L shoulder           STM L shoulder  3 min           Posterior & Inferior mobs  Grade III-IV                          20min               Exercise Diary  9/27/18 10/23/18           scap retraction TB             Shoulder ext TB             Nu step arms only  10"           Wall climbs with scap retraction x10 hold 10            cervical SB stretch x10 hold 10            Prone scap flexion NV            pec stretch x5 hold 10 10 x 10 "           Row Machine  2 x 10 60#           Lat Pulldowns  2 x 10  60#           Prone scap flexion  2 x 10 5" hold 2#           Anterior Raises  2 x 10 3#           Lateral Raises  2 x 10 3#           Strength/ROM/Special Tests  15 min                                                                                                          Modalities  9/27/18 10/23/18           CP  10'' post tx

## 2018-10-25 ENCOUNTER — OFFICE VISIT (OUTPATIENT)
Dept: PHYSICAL THERAPY | Facility: CLINIC | Age: 43
End: 2018-10-25
Payer: COMMERCIAL

## 2018-10-25 DIAGNOSIS — M75.82 ROTATOR CUFF TENDINITIS, LEFT: Primary | ICD-10-CM

## 2018-10-25 DIAGNOSIS — I10 BENIGN ESSENTIAL HYPERTENSION: Primary | ICD-10-CM

## 2018-10-25 PROCEDURE — 97140 MANUAL THERAPY 1/> REGIONS: CPT

## 2018-10-25 PROCEDURE — 97110 THERAPEUTIC EXERCISES: CPT

## 2018-10-25 RX ORDER — OLMESARTAN MEDOXOMIL 20 MG/1
TABLET ORAL
Qty: 30 TABLET | Refills: 0 | Status: SHIPPED | OUTPATIENT
Start: 2018-10-25 | End: 2018-11-24 | Stop reason: SDUPTHER

## 2018-10-25 NOTE — PROGRESS NOTES
Daily Note     Today's date: 10/25/2018  Patient name: Jm Draper  : 1975  MRN: [id number]  Referring provider: Shalini Augustin MD  Dx:   Encounter Diagnosis     ICD-10-CM    1  Rotator cuff tendinitis, left M75 82        Start Time: 1315  Stop Time: 1423  Total time in clinic (min): 68 minutes    Subjective: Patient reports no changes since last visit       Objective: See treatment diary below    Precautions: R labral shoulder tear , HBP, vertigo, TMJ, L shoulder calcific tendonitis    Daily Treatment Diary     Manual  9/27/18 10/2/18 10/4/18 10/10/18 10/11/18 10/16/18 10/18/18 10/25     PROM L shoulder  x5 min x5 min x5 min x5 min x5 min x5 min x5 min     STM L shoulder  x2 min x2  min x2 min x2 min  x2 min x5 min x4 min     Median nerve glides  x2 min x2 min x2 min x2 min x2 in x3 min      Scapular Mobs        X3 min     GH mobs        inferior/posterior 3 min      Shoulder Extension Stretch        X 3 min min     ARROM L shoulder  x1 min abd/ D1/D2 x1 min abd/D1D2 x1 min abd/D1/D2 x1 min abd/D1/D2 x1 min abd/D1/D2 x2 min abd/D1/D2       Total   10 min 10 min 10 min 10 min 10 min 15 min 20 min         Exercise Diary  9/27/18 10/2/18 10/4/18 10/10/18 10/11/18 10/16/18 10/18/18 10/25/18     scap retraction TB  Blue TB 2x10 hold 5 Blue TB 2x10 hold 5 Blue TB 2x10 hold 5 Blue TB 2x10 hold 5 Blue TB 2x10 hold 5 Blue TB 2x10 hold 5 Blue TB 2x10 hold 5     Shoulder ext TB  Blue TB 2x10 hold 5 Blue TB 2x10 hold 5 Blue TB 2x10 hold 5 Blue TB 2x10 hold 5 Blue TB 2x10 hold 5 Blue TB 2x10 hold 5      Nu step arms only seat10  Arms 10   Level 4 ,  25 miles 7 min 24 sec Level 4  25 miles 6 min Level 4  50 miles x10 min Level 5  50 miles  Level 5  50 miles 12 min Level 6  50 10 min 54 sec Level 6  50 10 min 54 sec     Wall climbs with scap retraction x10 hold 10 x20 hold 10 x20 hold 10 x20 hold 10 x20 hold 10 x20 hold 10 x20 hold 10      cervical SB stretch x10 hold 10 x10 hold 10 x10 hold 10 x10 hold 10 x10 hold 10 x10 hold 10 x10 hold 10      Prone scap flexion NV 2x10 on blue ball 2x10 on blue ball x10 hold 5 1# x10 hold 5 1# 10 hold 5 1# x10 hold 5 2#      pec stretch x5 hold 10 x10 hold 20 sec x10 hold 20 sec x10 hold 20 sec x10 hold 10 2x10 hold 10 2x10 hold 10      [place] rows   40# x10 , 30# x10  40# x20 40# 2x10  50# 2x10  60# 2x10 60# 3x10 60# 3x10     Lat pull down   30# x10, 40# x10 40# x10, x50 x 10 40# x10, 50# x10 - 60# 2x10 - 70# 3x10     Sos shoulder abd  x10 hold 10 ea x10 hold 10 ea x10 hold 10 x10 hold 10 x10 hold 10 x10 hold 10      Chest press paramount   50# 2x10 hold 5 50# 2x10 hold 5 - 50# 2x10 hold 5 60# 3x10      Prone shoulder abd    NV NV 1# x10 on ball 1# on ball 2x10 2# on ball 2x10      Supine cane abd AAROM     x10 hold 10 2x10 hold 5 2x10 hold 5      Lat raises       5# 2x10 4# x10 Hold     Ant raises       5# 2x6 4# x10 5# x 10 and 5# x 7     Planks        2 x 30 Sec     SL Serratus        1 #  1 x 10     Prone T's Ys Is              Planks with Cone Taps        3 cycles BL                       Modalities  9/27/18 10/2/18 10/4/18 10/10/18 10/11/18 10/16 10/18/18 10/25/18     CP  x10 hold 10 x10 hold 10 x10 hold 10 x10 hold 10 x10 hold 10 x10 hold 10 x10 hold 10                                   Assessment: Tolerated treatment well  Attempted to progress Anterior raises to 5# but was unable to complete 2 sets of 10, patient was only able to complete 1 set of 10 and 1 set of 7 due to discomfort in R shoulder  Added planks and planks with cone taps to continue to strength shoulder and core stabilizers which was completed symptom free and patient maintained good form  Patient would benefit from continued PT in order to continue to strengthen muscles of the L shoulder and periscapular muscualture, as well as continue with core stabilization to prevent re injury  Plan: Continue per plan of care

## 2018-10-30 ENCOUNTER — APPOINTMENT (OUTPATIENT)
Dept: PHYSICAL THERAPY | Facility: CLINIC | Age: 43
End: 2018-10-30
Payer: COMMERCIAL

## 2018-11-07 ENCOUNTER — OFFICE VISIT (OUTPATIENT)
Dept: PHYSICAL THERAPY | Facility: CLINIC | Age: 43
End: 2018-11-07
Payer: COMMERCIAL

## 2018-11-07 DIAGNOSIS — M75.82 ROTATOR CUFF TENDINITIS, LEFT: Primary | ICD-10-CM

## 2018-11-07 PROCEDURE — 97140 MANUAL THERAPY 1/> REGIONS: CPT

## 2018-11-07 PROCEDURE — 97110 THERAPEUTIC EXERCISES: CPT

## 2018-11-07 NOTE — PROGRESS NOTES
Daily Note     Today's date: 2018  Patient name: Richard Burgos  : 1975  MRN: [id number]  Referring provider: Kaylan Narayanan MD  Dx:   Encounter Diagnosis     ICD-10-CM    1  Rotator cuff tendinitis, left M75 82        Start Time: 1500  Stop Time: 1600  Total time in clinic (min): 60 minutes    Subjective: Patient reports that he can tolerate activities better compared to IE, but still has pain when doing more strenuous activities         Objective: See treatment diary below      Precautions: R labral shoulder tear 2007, HBP, vertigo, TMJ, L shoulder calcific tendonitis    Daily Treatment Diary     Manual  9/27/18 10/2/18 10/4/18 10/10/18 10/11/18 10/16/18 10/18/18 10/25 11/7    PROM L shoulder  x5 min x5 min x5 min x5 min x5 min x5 min x5 min x5 min    STM L shoulder  x2 min x2  min x2 min x2 min  x2 min x5 min x4 min x4 min    Median nerve glides  x2 min x2 min x2 min x2 min x2 in x3 min      Scapular Mobs        X3 min X3 min    GH mobs        inferior/posterior 3 min  inferior/posterior 3 min    Shoulder Extension Stretch        X 3 min min     ARROM L shoulder  x1 min abd/ D1/D2 x1 min abd/D1D2 x1 min abd/D1/D2 x1 min abd/D1/D2 x1 min abd/D1/D2 x2 min abd/D1/D2   x2 min abd/D1/D2     Total   10 min 10 min 10 min 10 min 10 min 15 min 20 min 15 min        Exercise Diary  9/27/18 10/2/18 10/4/18 10/10/18 10/11/18 10/16/18 10/18/18 10/25/18 11/7/18    scap retraction TB  Blue TB 2x10 hold 5 Blue TB 2x10 hold 5 Blue TB 2x10 hold 5 Blue TB 2x10 hold 5 Blue TB 2x10 hold 5 Blue TB 2x10 hold 5 Blue TB 2x10 hold 5 Blue TB 2x10 hold 5    Shoulder ext TB  Blue TB 2x10 hold 5 Blue TB 2x10 hold 5 Blue TB 2x10 hold 5 Blue TB 2x10 hold 5 Blue TB 2x10 hold 5 Blue TB 2x10 hold 5  Blue TB 2x10 hold 5    Nu step arms only seat10  Arms 10   Level 4 ,  25 miles 7 min 24 sec Level 4  25 miles 6 min Level 4  50 miles x10 min Level 5  50 miles  Level 5  50 miles 12 min Level 6  50 10 min 54 sec Level 6  50 10 min 54 sec Level 6  50 10 min 54 sec    Wall climbs with scap retraction x10 hold 10 x20 hold 10 x20 hold 10 x20 hold 10 x20 hold 10 x20 hold 10 x20 hold 10      cervical SB stretch x10 hold 10 x10 hold 10 x10 hold 10 x10 hold 10 x10 hold 10 x10 hold 10 x10 hold 10      Prone scap flexion NV 2x10 on blue ball 2x10 on blue ball x10 hold 5 1# x10 hold 5 1# 10 hold 5 1# x10 hold 5 2#      pec stretch x5 hold 10 x10 hold 20 sec x10 hold 20 sec x10 hold 20 sec x10 hold 10 2x10 hold 10 2x10 hold 10      [place] rows   40# x10 , 30# x10  40# x20 40# 2x10  50# 2x10  60# 2x10 60# 3x10 60# 3x10 60# 3x10    Lat pull down   30# x10, 40# x10 40# x10, x50 x 10 40# x10, 50# x10 - 60# 2x10 - 70# 3x10 70# 3x10    Sos shoulder abd  x10 hold 10 ea x10 hold 10 ea x10 hold 10 x10 hold 10 x10 hold 10 x10 hold 10      Chest press paramount   50# 2x10 hold 5 50# 2x10 hold 5 - 50# 2x10 hold 5 60# 3x10      Prone shoulder abd    NV NV 1# x10 on ball 1# on ball 2x10 2# on ball 2x10      Supine cane abd AAROM     x10 hold 10 2x10 hold 5 2x10 hold 5      Lat raises       5# 2x10 4# x10 Hold     Ant raises       5# 2x6 4# x10 5# x 10 and 5# x 7 5# x 10 and 5# x 7    Planks        2 x 30 Sec     SL Serratus        1 #  1 x 10     Prone T's Ys Is              Planks with Cone Taps        3 cycles BL      Horizontal Abduction BL         2 x 10 YTB    Seated Ball Flexion         10 x 10"        Modalities  9/27/18 10/2/18 10/4/18 10/10/18 10/11/18 10/16 10/18/18 10/25/18 11/7/18    CP  x10 hold 10 x10 hold 10 x10 hold 10 x10 hold 10 x10 hold 10 x10 hold 10 10' post tx 10' post tx                                      Assessment: Tolerated treatment well  With minimal symptom aggravation on the L side   Added seated ball flexion due to lat tightness present BL contributing to reduced L UE ROM  Also added horizontal abduction BL to promote periscapular musculature strengthening and promote reduction of narrowing of subacromial space   Patient would benefit from continued PT in order to continue to reduce pain in the L shoulder and promote improvement in strength  Plan: Continue per plan of care

## 2018-11-08 ENCOUNTER — OFFICE VISIT (OUTPATIENT)
Dept: PHYSICAL THERAPY | Facility: CLINIC | Age: 43
End: 2018-11-08
Payer: COMMERCIAL

## 2018-11-08 DIAGNOSIS — M75.82 ROTATOR CUFF TENDINITIS, LEFT: Primary | ICD-10-CM

## 2018-11-08 PROCEDURE — 97110 THERAPEUTIC EXERCISES: CPT

## 2018-11-08 PROCEDURE — 97140 MANUAL THERAPY 1/> REGIONS: CPT

## 2018-11-08 NOTE — PROGRESS NOTES
Daily Note     Today's date: 2018  Patient name: Dorota Caldwell  : 1975  MRN: [id number]  Referring provider: Abel Bailey MD  Dx:   Encounter Diagnosis     ICD-10-CM    1  Rotator cuff tendinitis, left M75 82        Start Time: 1200  Stop Time: 1300  Total time in clinic (min): 60 minutes    Subjective: Patient reports no pain upon arrival and states he feels better compared to last visit  No pain reported through out session       Objective: See treatment diary below    Precautions: R labral shoulder tear 2007, HBP, vertigo, TMJ, L shoulder calcific tendonitis    Daily Treatment Diary     Manual  9/27/18 10/2/18 10/4/18 10/10/18 10/11/18 10/16/18 10/18/18 10/25 11/7 11/8   PROM L shoulder  x5 min x5 min x5 min x5 min x5 min x5 min x5 min x5 min x5 min   STM L shoulder  x2 min x2  min x2 min x2 min  x2 min x5 min x4 min x4 min x5 min   Median nerve glides  x2 min x2 min x2 min x2 min x2 in x3 min      Scapular Mobs        X3 min X3 min X3 min   GH mobs        inferior/posterior 3 min  inferior/posterior 3 min inferior/posterior 3 min   Shoulder Extension Stretch        X 3 min min     ARROM L shoulder  x1 min abd/ D1/D2 x1 min abd/D1D2 x1 min abd/D1/D2 x1 min abd/D1/D2 x1 min abd/D1/D2 x2 min abd/D1/D2   x2 min abd/D1/D2  x2 min abd/D1/D2   Total   10 min 10 min 10 min 10 min 10 min 15 min 20 min 15 min 15 min       Exercise Diary  9/27/18 10/2/18 10/4/18 10/10/18 10/11/18 10/16/18 10/18/18 10/25/18 11/7/18 11/8/18   scap retraction TB  Blue TB 2x10 hold 5 Blue TB 2x10 hold 5 Blue TB 2x10 hold 5 Blue TB 2x10 hold 5 Blue TB 2x10 hold 5 Blue TB 2x10 hold 5 Blue TB 2x10 hold 5 Blue TB 2x10 hold 5    Shoulder ext TB  Blue TB 2x10 hold 5 Blue TB 2x10 hold 5 Blue TB 2x10 hold 5 Blue TB 2x10 hold 5 Blue TB 2x10 hold 5 Blue TB 2x10 hold 5  Blue TB 2x10 hold 5    Nu step arms only seat10  Arms 10   Level 4 ,  25 miles 7 min 24 sec Level 4  25 miles 6 min Level 4  50 miles x10 min Level 5  50 miles Level 5  50 miles 12 min Level 6  50 10 min 54 sec Level 6  50 10 min 54 sec Level 6  50 10 min 54 sec UBE Fprward and retro L3 10 min   Wall climbs with scap retraction x10 hold 10 x20 hold 10 x20 hold 10 x20 hold 10 x20 hold 10 x20 hold 10 x20 hold 10      cervical SB stretch x10 hold 10 x10 hold 10 x10 hold 10 x10 hold 10 x10 hold 10 x10 hold 10 x10 hold 10      Prone scap flexion NV 2x10 on blue ball 2x10 on blue ball x10 hold 5 1# x10 hold 5 1# 10 hold 5 1# x10 hold 5 2#      pec stretch x5 hold 10 x10 hold 20 sec x10 hold 20 sec x10 hold 20 sec x10 hold 10 2x10 hold 10 2x10 hold 10      [place] rows   40# x10 , 30# x10  40# x20 40# 2x10  50# 2x10  60# 2x10 60# 3x10 60# 3x10 60# 3x10    Lat pull down   30# x10, 40# x10 40# x10, x50 x 10 40# x10, 50# x10 - 60# 2x10 - 70# 3x10 70# 3x10    Sos shoulder abd  x10 hold 10 ea x10 hold 10 ea x10 hold 10 x10 hold 10 x10 hold 10 x10 hold 10      Chest press paramount   50# 2x10 hold 5 50# 2x10 hold 5 - 50# 2x10 hold 5 60# 3x10      Prone shoulder abd    NV NV 1# x10 on ball 1# on ball 2x10 2# on ball 2x10      Supine cane abd AAROM     x10 hold 10 2x10 hold 5 2x10 hold 5      Lat raises       5# 2x10 4# x10 Hold     Ant raises       5# 2x6 4# x10 5# x 10 and 5# x 7 5# x 10 and 5# x 7    Planks        2 x 30 Sec     SL Serratus        1 #  1 x 10     Prone T's Ys Is              Planks with Cone Taps        3 cycles BL      Horizontal Abduction BL         2 x 10 YTB 2 x 10 YTB   Cane Flexion/ER          10 x 10" each   TB ER/IR          [place] Pose Standing   [place] Pose Standing             TB Rows/Shoulder Extension             SL ER          2 x 10 Y   BL ER TB           2 x 10 2#   Seated Ball Flexion         10 x 10" 10 x 10"       Modalities  9/27/18 10/2/18 10/4/18 10/10/18 10/11/18 10/16 10/18/18 10/25/18 11/7/18 11/8/18   CP  x10 hold 10 x10 hold 10 x10 hold 10 x10 hold 10 x10 hold 10 x10 hold 10 10' post tx 10' post tx 10' post tx   MHP          10' pre tx while on UBE                    Assessment: Tolerated treatment well  No increase in pain with exercises added today  Continues to have pain in the superior/anterior L shoulder that was not further agitated with RTC strengthening  Patient would benefit from continued PT in order to continue to strengthen L UE and promote pain free activity  Plan: Continue per plan of care

## 2018-11-09 ENCOUNTER — OFFICE VISIT (OUTPATIENT)
Dept: FAMILY MEDICINE CLINIC | Facility: CLINIC | Age: 43
End: 2018-11-09
Payer: COMMERCIAL

## 2018-11-09 VITALS
TEMPERATURE: 97.1 F | SYSTOLIC BLOOD PRESSURE: 120 MMHG | DIASTOLIC BLOOD PRESSURE: 80 MMHG | WEIGHT: 198 LBS | RESPIRATION RATE: 16 BRPM | HEART RATE: 56 BPM | BODY MASS INDEX: 30.01 KG/M2 | HEIGHT: 68 IN

## 2018-11-09 DIAGNOSIS — J06.9 ACUTE UPPER RESPIRATORY INFECTION: Primary | ICD-10-CM

## 2018-11-09 PROCEDURE — 99213 OFFICE O/P EST LOW 20 MIN: CPT | Performed by: NURSE PRACTITIONER

## 2018-11-09 PROCEDURE — 3008F BODY MASS INDEX DOCD: CPT | Performed by: NURSE PRACTITIONER

## 2018-11-09 RX ORDER — AMOXICILLIN 875 MG/1
875 TABLET, COATED ORAL 2 TIMES DAILY
Qty: 20 TABLET | Refills: 0 | Status: SHIPPED | OUTPATIENT
Start: 2018-11-09 | End: 2018-11-19

## 2018-11-09 RX ORDER — CETIRIZINE HYDROCHLORIDE 10 MG/1
10 TABLET ORAL DAILY
COMMUNITY
End: 2019-07-31 | Stop reason: SDUPTHER

## 2018-11-09 NOTE — PROGRESS NOTES
Assessment/Plan:    Take antibiotics until finished  Reviewed supportive care  Avoid decongestants with HTN  RTO prn  Problem List Items Addressed This Visit     None      Visit Diagnoses     Acute upper respiratory infection    -  Primary    Relevant Medications    amoxicillin (AMOXIL) 875 mg tablet          There are no Patient Instructions on file for this visit  Return if symptoms worsen or fail to improve, for Next scheduled follow up  Subjective:      Patient ID: Cristal Vicente is a 44 y o  male  Chief Complaint   Patient presents with   [employer]     for 1 days prcma    Sore Throat       Here today with complaints of left ear pain and a sore throat that started yesterday  Denies sinus congestion, cough, fever, or difficulty breathing  Restarted Flonase 2 days ago and has been taking Claritin  Not taking anything OTC for symptoms  Has chronic ear infections with hearing loss, evaluated by ENT, MRI brain normal          The following portions of the patient's history were reviewed and updated as appropriate: allergies, current medications, past family history, past medical history, past social history, past surgical history and problem list     Review of Systems   Constitutional: Negative for chills, fatigue and fever  HENT: Positive for ear pain and sore throat  Negative for congestion, postnasal drip, rhinorrhea and sinus pressure  Respiratory: Negative for cough, shortness of breath and wheezing  Cardiovascular: Negative for chest pain  Gastrointestinal: Negative for abdominal pain, diarrhea, nausea and vomiting  Musculoskeletal: Negative for arthralgias  Skin: Negative for rash  Neurological: Negative for headaches           Current Outpatient Prescriptions   Medication Sig Dispense Refill    Ascorbic Acid (VITAMIN C) 100 MG tablet Take 1 tablet by mouth daily      cetirizine (ZYRTEC ALLERGY) 10 mg tablet Take 10 mg by mouth daily      cholecalciferol (VITAMIN D3) 1,000 units tablet Take 1 tablet by mouth daily      fluticasone (FLONASE) 50 mcg/act nasal spray 2 sprays into each nostril daily      olmesartan (BENICAR) 20 mg tablet TAKE ONE TABLET BY MOUTH ONCE DAILY 30 tablet 0    Omega-3 Fatty Acids (FISH OIL) 645 MG CAPS Take 1 capsule by mouth daily      vitamin A 10,000 units capsule Take 1 tablet by mouth daily      VITAMIN B COMPLEX-C PO Take 1 capsule by mouth daily      Vitamin E 400 units TABS Take 1 tablet by mouth daily      amoxicillin (AMOXIL) 875 mg tablet Take 1 tablet (875 mg total) by mouth 2 (two) times a day for 10 days 20 tablet 0     No current facility-administered medications for this visit  Objective:    /80   Pulse 56   Temp (!) 97 1 °F (36 2 °C)   Resp 16   Ht 5' 8" (1 727 m)   Wt 89 8 kg (198 lb)   BMI 30 11 kg/m²        Physical Exam   Constitutional: He appears well-developed and well-nourished  HENT:   Head: Normocephalic and atraumatic  Right Ear: Tympanic membrane, external ear and ear canal normal    Left Ear: External ear and ear canal normal  Tympanic membrane is erythematous  A middle ear effusion is present  Nose: No mucosal edema or rhinorrhea  Mouth/Throat: Uvula is midline, oropharynx is clear and moist and mucous membranes are normal    Eyes: Conjunctivae are normal    Neck: Neck supple  No edema present  No thyromegaly present  Cardiovascular: Normal rate, regular rhythm, normal heart sounds and intact distal pulses  No murmur heard  Pulmonary/Chest: Effort normal and breath sounds normal    Abdominal: Bowel sounds are normal  He exhibits no distension  There is no splenomegaly or hepatomegaly  There is no tenderness  Lymphadenopathy:        Right cervical: No superficial cervical adenopathy present  Left cervical: No superficial cervical adenopathy present  Skin: Skin is warm, dry and intact  No rash noted  Psychiatric: He has a normal mood and affect     Nursing note and vitals reviewed               Mathieu Tolbert

## 2018-11-13 ENCOUNTER — OFFICE VISIT (OUTPATIENT)
Dept: PHYSICAL THERAPY | Facility: CLINIC | Age: 43
End: 2018-11-13
Payer: COMMERCIAL

## 2018-11-13 DIAGNOSIS — M75.82 ROTATOR CUFF TENDINITIS, LEFT: Primary | ICD-10-CM

## 2018-11-13 PROCEDURE — 97140 MANUAL THERAPY 1/> REGIONS: CPT

## 2018-11-13 PROCEDURE — 97110 THERAPEUTIC EXERCISES: CPT

## 2018-11-13 NOTE — PROGRESS NOTES
Daily Note     Today's date: 2018  Patient name: Cristina Velazquez  : 1975  MRN: [id number]  Referring provider: Jane Huertas MD  Dx:   Encounter Diagnosis     ICD-10-CM    1  Rotator cuff tendinitis, left M75 82        Start Time: 0900  Stop Time: 0955  Total time in clinic (min): 55 minutes    Subjective: Patient reports that his shoulder has been feeling better lately and the soreness does not last as long as it used to  He feels the TENs unit has helped his pain levels         Objective: See treatment diary below    Precautions: R labral shoulder tear , HBP, vertigo, TMJ, L shoulder calcific tendonitis    Daily Treatment Diary     Manual  11/13/18 10/2/18 10/4/18 10/10/18 10/11/18 10/16/18 10/18/18 10/25 11/7 11/8   PROM L shoulder x5 min x5 min x5 min x5 min x5 min x5 min x5 min x5 min x5 min x5 min   STM L shoulder x5 min x2 min x2  min x2 min x2 min  x2 min x5 min x4 min x4 min x5 min   Median nerve glides  x2 min x2 min x2 min x2 min x2 in x3 min      Scapular Mobs        X3 min X3 min X3 min   GH mobs inferior/posterior 3 min       inferior/posterior 3 min  inferior/posterior 3 min inferior/posterior 3 min   Shoulder Extension Stretch        X 3 min min     ARROM L shoulder  x1 min abd/ D1/D2 x1 min abd/D1D2 x1 min abd/D1/D2 x1 min abd/D1/D2 x1 min abd/D1/D2 x2 min abd/D1/D2   x2 min abd/D1/D2  x2 min abd/D1/D2   Total  15 min 10 min 10 min 10 min 10 min 10 min 15 min 20 min 15 min 15 min       Exercise Diary  11/13/18 10/2/18 10/4/18 10/10/18 10/11/18 10/16/18 10/18/18 10/25/18 11/7/18 11/8/18   scap retraction TB  Blue TB 2x10 hold 5 Blue TB 2x10 hold 5 Blue TB 2x10 hold 5 Blue TB 2x10 hold 5 Blue TB 2x10 hold 5 Blue TB 2x10 hold 5 Blue TB 2x10 hold 5 Blue TB 2x10 hold 5    Shoulder ext TB  Blue TB 2x10 hold 5 Blue TB 2x10 hold 5 Blue TB 2x10 hold 5 Blue TB 2x10 hold 5 Blue TB 2x10 hold 5 Blue TB 2x10 hold 5  Blue TB 2x10 hold 5    Nu step arms only seat10  Arms 10  UBE Fprward and retro L3 6 min Level 4 ,  25 miles 7 min 24 sec Level 4  25 miles 6 min Level 4  50 miles x10 min Level 5  50 miles  Level 5  50 miles 12 min Level 6  50 10 min 54 sec Level 6  50 10 min 54 sec Level 6  50 10 min 54 sec UBE Fprward and retro L3 10 min   Wall climbs with scap retraction  x20 hold 10 x20 hold 10 x20 hold 10 x20 hold 10 x20 hold 10 x20 hold 10      cervical SB stretch  x10 hold 10 x10 hold 10 x10 hold 10 x10 hold 10 x10 hold 10 x10 hold 10      Prone scap flexion  2x10 on blue ball 2x10 on blue ball x10 hold 5 1# x10 hold 5 1# 10 hold 5 1# x10 hold 5 2#      pec stretch  x10 hold 20 sec x10 hold 20 sec x10 hold 20 sec x10 hold 10 2x10 hold 10 2x10 hold 10      [place] rows   40# x10 , 30# x10  40# x20 40# 2x10  50# 2x10  60# 2x10 60# 3x10 60# 3x10 60# 3x10    Lat pull down   30# x10, 40# x10 40# x10, x50 x 10 40# x10, 50# x10 - 60# 2x10 - 70# 3x10 70# 3x10    Sos shoulder abd  x10 hold 10 ea x10 hold 10 ea x10 hold 10 x10 hold 10 x10 hold 10 x10 hold 10      Chest press paramount   50# 2x10 hold 5 50# 2x10 hold 5 - 50# 2x10 hold 5 60# 3x10      Prone shoulder abd    NV NV 1# x10 on ball 1# on ball 2x10 2# on ball 2x10      Supine cane abd AAROM     x10 hold 10 2x10 hold 5 2x10 hold 5      Lat raises       5# 2x10 4# x10 Hold     Ant raises       5# 2x6 4# x10 5# x 10 and 5# x 7 5# x 10 and 5# x 7    Planks        2 x 30 Sec     SL Serratus        1 #  1 x 10     Prone T's Ys Is              Planks with Cone Taps        3 cycles BL      Horizontal Abduction BL 3 x 10 RTB        2 x 10 YTB 2 x 10 YTB   Cane Flexion/ER 10 x 10"" each         10 x 10" each   TB ER/IR Black  2 x 10 each          [employer] Pose Standing 6 x 10"            TB Rows/Shoulder Extension             SL ER          2 x 10 Y   BL ER TB  3 x 10 RTB         2 x 10 2#   Seated Ball Flexion 6 x 10" each direction        10 x 10" 10 x 10"       Modalities  11/13/18 10/2/18 10/4/18 10/10/18 10/11/18 10/16 10/18/18 10/25/18 11/7/18 11/8/18   CP  x10 hold 10 x10 hold 10 x10 hold 10 x10 hold 10 x10 hold 10 x10 hold 10 10' post tx 10' post tx 10' post tx   MHP 6' while on UBE         10' pre tx while on UBE                      Assessment: Tolerated treatment well  He had no symptom aggravation with exercises today  Lat stretches with ball flexion and child's pose have helped improve flexibility of the L UE  Able to complete TB ER/IR symptom free with slight discomfort in the L UE with ER eccentric control  Patient would benefit from continued PT in order to improve pain free function and address UE strengthen  Plan: Continue per plan of care

## 2018-11-14 ENCOUNTER — APPOINTMENT (OUTPATIENT)
Dept: PHYSICAL THERAPY | Facility: CLINIC | Age: 43
End: 2018-11-14
Payer: COMMERCIAL

## 2018-11-15 ENCOUNTER — OFFICE VISIT (OUTPATIENT)
Dept: PHYSICAL THERAPY | Facility: CLINIC | Age: 43
End: 2018-11-15
Payer: COMMERCIAL

## 2018-11-15 DIAGNOSIS — M75.82 ROTATOR CUFF TENDINITIS, LEFT: Primary | ICD-10-CM

## 2018-11-15 PROCEDURE — 97140 MANUAL THERAPY 1/> REGIONS: CPT | Performed by: PHYSICAL THERAPIST

## 2018-11-15 PROCEDURE — 97110 THERAPEUTIC EXERCISES: CPT | Performed by: PHYSICAL THERAPIST

## 2018-11-15 NOTE — PROGRESS NOTES
Daily Note     Today's date: 11/15/2018  Patient name: Dominic Puckett  : 1975  MRN: [id number]  Referring provider: Harshil Vásquez MD  Dx:   Encounter Diagnosis     ICD-10-CM    1  Rotator cuff tendinitis, left M75 82        Start Time: 845  Stop Time: 930  Total time in clinic (min): 45 minutes    Subjective: "Actually it's feeling better now  But I haven't done anything strenuous which is when it usually gets irritated "      Objective: See treatment diary below  L shldr AROM WNL    Precautions: R labral shoulder tear , HBP, vertigo, TMJ, L shoulder calcific tendonitis    Daily Treatment Diary     Manual  11/13/18 11/15/18           PROM L shoulder x5 min 5'           STM L shoulder x5 min 5'           Median nerve glides             Scapular Mobs             GH mobs inferior/posterior 3 min 5'           Shoulder Extension Stretch             ARROM L shoulder             Total  15 min 15'               Exercise Diary  11/13/18 11/15/18           scap retraction TB             Shoulder ext TB             Nu step arms only seat10  Arms 10  UBE Fprward and retro L3 6 min UBE Fprward and retro L3 10 min           Wall climbs with scap retraction             cervical SB stretch             Prone scap flexion             pec stretch             [place] rows              Lat pull down              Sos shoulder abd             Chest press paramount             Prone shoulder abd              Supine cane abd AAROM             Lat raises              Ant raises              Planks             SL Serratus             Prone T's Ys Is              Planks with Cone Taps             Horizontal Abduction BL 3 x 10 RTB 3x10            Cane Flexion/ER 10 x 10"" each 10x10s           TB ER/IR Black  2 x 10 each  2x10 ea           [place] Pose Standing 6 x 10" 6x 10s            TB Rows/Shoulder Extension             SL ER             BL ER TB  3 x 10 RTB 3x10           Seated Ball Flexion 6 x 10" each direction 6x 10s               Modalities  11/13/18            CP  Ice and stim 10'           MHP 6' while on UBE                             Assessment: Tolerated treatment well  Patient demo mild ERP with flexion during PROM  Plan: Continue per plan of care

## 2018-11-19 ENCOUNTER — OFFICE VISIT (OUTPATIENT)
Dept: PHYSICAL THERAPY | Facility: CLINIC | Age: 43
End: 2018-11-19
Payer: COMMERCIAL

## 2018-11-19 DIAGNOSIS — M75.82 ROTATOR CUFF TENDINITIS, LEFT: Primary | ICD-10-CM

## 2018-11-19 PROCEDURE — 97110 THERAPEUTIC EXERCISES: CPT

## 2018-11-19 PROCEDURE — 97140 MANUAL THERAPY 1/> REGIONS: CPT

## 2018-11-19 NOTE — PROGRESS NOTES
Daily Note     Today's date: 2018  Patient name: Ina Singh  : 1975  MRN: [id number]  Referring provider: Josefina Patino MD  Dx:   Encounter Diagnosis     ICD-10-CM    1  Rotator cuff tendinitis, left M75 82        Start Time: 0800  Stop Time: 0852  Total time in clinic (min): 52 minutes    Subjective: Patient reports no changes since his last visit       Objective: See treatment diary below  Precautions: R labral shoulder tear , HBP, vertigo, TMJ, L shoulder calcific tendonitis    Daily Treatment Diary     Manual  11/13/18 11/15/18 11/19/18          PROM L shoulder x5 min 5' 5'          STM L shoulder x5 min 5' 5'          Median nerve glides             Scapular Mobs             GH mobs inferior/posterior 3 min 5' nferior/posterior 3 min          Shoulder Extension Stretch   3 x 30"           ARROM L shoulder             Total  15 min 15'               Exercise Diary  11/13/18 11/15/18 11/19/18          scap retraction TB             Shoulder ext TB             Nu step arms only seat10  Arms 10  UBE Fprward and retro L3 6 min UBE Fprward and retro L3 10 min UBE Fprward and retro L3 10 min          Wall climbs with scap retraction             cervical SB stretch             Prone scap flexion             pec stretch             [place] rows              Lat pull down              Sos shoulder abd             Chest press paramount             Prone shoulder abd              Supine cane abd AAROM             Lat raises              Ant raises              Planks             SL Serratus             Prone T's Ys Is              Planks with Cone Taps             Horizontal Abduction BL 3 x 10 RTB 3x10  3 x 10 GTB          Cane Flexion/ER 10 x 10"" each 10x10s 10 x 10"" each          TB ER/IR Black  2 x 10 each  2x10 ea 2x10 each  TB black          Ubaldo Pose Standing 6 x 10" 6x 10s  6x 10s           TB Rows/Shoulder Extension             SL ER             BL ER TB  3 x 10 RTB 3x10 3 x 10 GTB          Seated Ball Flexion 6 x 10" each direction 6x 10s               Modalities  11/13/18 11/19/18          CP  Ice and stim 10' Ice and stim 10'          MHP 6' while on UBE                             Assessment: Tolerated treatment well  Progressed BL Horizontal abduction and BL TB ER to GTB without increase in pain  Continues to show improvements in activity tolerance and pain levels  Plan: Continue per plan of care

## 2018-11-21 ENCOUNTER — OFFICE VISIT (OUTPATIENT)
Dept: PHYSICAL THERAPY | Facility: CLINIC | Age: 43
End: 2018-11-21
Payer: COMMERCIAL

## 2018-11-21 DIAGNOSIS — M75.82 ROTATOR CUFF TENDINITIS, LEFT: Primary | ICD-10-CM

## 2018-11-21 PROCEDURE — 97110 THERAPEUTIC EXERCISES: CPT

## 2018-11-21 PROCEDURE — G8982 BODY POS GOAL STATUS: HCPCS

## 2018-11-21 PROCEDURE — 97140 MANUAL THERAPY 1/> REGIONS: CPT

## 2018-11-21 PROCEDURE — G8981 BODY POS CURRENT STATUS: HCPCS

## 2018-11-21 NOTE — PROGRESS NOTES
PT Re-Evaluation     Today's date: 2018  Patient name: Dorota Caldwell  : 1975  MRN: [id number]  Referring provider: Abel Bailey MD  Dx:   Encounter Diagnosis     ICD-10-CM    1  Rotator cuff tendinitis, left M75 82        Start Time: 0800  Stop Time: 0855  Total time in clinic (min): 55 minutes    Assessment  Assessment details: Dorota Caldwell is a 44 y o  male who presents with: Rotator cuff tendinitis, left  (primary encounter diagnosis)  Pt  Continues to present with pain that worsens with activity and continues to reach 6/10 at worst  Improvements made in UE range of motion but continues to have limitations with painful end range  Reductions UE strength compared to R side but has made improvements compared to IE  Iimpaired function, decreased activity tolerance and fair posture  Pt presents with these impairments and would benefit from skilled physical therapy to address these impairments in order to maximize their function  18  Patient has reported 50% improvement since beginning PT  Continues to have 6/10 pain at worst with lifting heavy, driving for long periods, and wearing his heavy gear for work  Improvements seen in AROM and PROM with flexion, abduction and ER compared to last visit indicating improvements in capsular retrictions, shortened soft tissue restrictions, and weakness  Patient also has improvements in [address] from 4+ to 5/5 indicating improvements in overhead reaching and RTC  Continues to have (+) special tests indicated rotator cuff involvement  Patient would benefit from skilled PT in order to promote pain free function, continue to improve AROM/PROM to WNL ,improve strength to 5/5 for flexion abduction & ER, continue to reduce soft tissue and capsular restrictions, and present (-) for special tests    Impairments: abnormal muscle firing, abnormal muscle tone, abnormal or restricted ROM, abnormal movement, activity intolerance, impaired balance, impaired physical strength, pain with function, poor posture  and poor body mechanics  Functional limitations: decreased overhead activity, decreased ability to peach across bodyand behind back for ADL, decreased ability to lift at gymUnderstanding of Dx/Px/POC: good   Prognosis: good    Goals  Short term goals:  (to be met in 4 weeks)  + paitent will report a 50% improvement in function-MET  + Patient will be independent in basic HEP-PARTIALLY MET  +Patient will be able to lift arms overhead with pain less than 3/10 L shoulder- NOT MET  +Patient will be able to put seat belt on with pain less than 3/10 L shoulder-PARTIALLY MET    Long term goals: (to be met in 8 weeks)    + Patient will report 85 % improvement improvement in symptoms with ADL's-NOT MET  + Patient will have full range of motion left shoulder painfree-PARTIALLY MET  + Patient will have pain level 0/10 L shoulder with sleeping-NOT MET  + Patient will increase FOTO score to 74 (10 sessions)-NOT MET  + Patient will demonstrate appropriate scapulohumeral rhythm with reaching overhead-PARTIALLY MET   + patient will demonstrate functional reaching without compensatory patterns  -PARTIALLY MET  + Patient will be independent in a comprehensive home exercise program -NOT MET          Plan  Patient would benefit from: skilled physical therapy  Planned modality interventions: cryotherapy  Planned therapy interventions: abdominal trunk stabilization, activity modification, ADL retraining, ADL training, body mechanics training, breathing training, flexibility, functional ROM exercises, home exercise program, graded exercise, graded activity, therapeutic training, therapeutic exercise, therapeutic activities, stretching, strengthening, postural training, patient education, neuromuscular re-education, manual therapy and joint mobilization  Frequency: 2x week  Duration in weeks: 8  Treatment plan discussed with: patient        Subjective Evaluation    History of Present Illness  Mechanism of injury: Pt reporting he started to note pain L shoulder approx in 2018  Pt exercises at [employer] 3-4x/week  L PEC tear reporting approx 3 years ago with no surgical intervention  Pt lifting overhead and reaching behind back, reaching forward, reaching across body upward  10/23/18  Patient reports that there is still constant pain and gets worse as he uses it  It feels like it has improved but still reaches 6/10 at worst after activity with L UE      18  Pt reports that since beginning PT he feels he has improved by 50%  Some days he feels great, and other days he feels the same as the first day  Pain at worst is recently 5/10 after lifting heavy, wearing his heavy gear for work or while driving  Not a recurrent problem   Quality of life: good    Pain  Current pain ratin  At best pain ratin  At worst pain ratin  Location: L  shoulder and radiating pain  Quality: dull ache and throbbing  Relieving factors: relaxation, change in position and ice  Aggravating factors: overhead activity and lifting  Progression: worsening    Social Support    Employment status: working ()  Hand dominance: right  Exercise history: Strength training 3-4x /week and cardio      Diagnostic Tests  X-ray: abnormal  Treatments  No previous or current treatments  Patient Goals  Patient goals for therapy: decreased pain, increased motion, return to sport/leisure activities, independence with ADLs/IADLs and increased strength  Patient goal: "to try and alleviate the gym"        Objective     Palpation   Left   Tenderness of the anterior deltoid, biceps, infraspinatus, latissimus, middle trapezius, pectoralis minor and posterior deltoid       Cervical/Thoracic Screen   Cervical range of motion within normal limits with the following exceptions: Cervical ROM WFL with pain at end rnge    18  Pain with L rotation & SBing on the L side    Neurological Testing     Additional Neurological Details  C/o pain radiating L UE  No change in L shoulder pain with cervical motion  B UT tightness noted     Active Range of Motion   Left Shoulder   Flexion: 160 degrees with pain  Abduction: 150 degrees with pain  External rotation BTH: T2 with pain  Internal rotation BTB: T8 with pain    Right Shoulder   Flexion: 170 degrees   Abduction: 170 degrees   External rotation BTH: T2   Internal rotation BTB: T8     Additional Active Range of Motion Details  9/27/18:  Functional  IR (C7 to tip of thumb) 23 cm L and 19 cm L     Passive Range of Motion   Left Shoulder   Flexion: 155 degrees with pain  Abduction: 150 degrees with pain    Right Shoulder   Normal passive range of motion    Scapular Mobility     Additional Scapular Mobility Details  Poor scapular stabilization noted with UE movements  Strength/Myotome Testing     Left Shoulder     Planes of Motion   Flexion: 4+   Extension: 4+   Abduction: 4+   External rotation at 90°: 4+   Internal rotation at 0°: 5     Right Shoulder     Planes of Motion   Flexion: 5   Extension: 5   Abduction: 5   External rotation at 90°: 5   Internal rotation at 90°: 5     Tests     Left Shoulder   Positive empty can, Neer's and painful arc  Negative laxity (step off)       General Comments     Shoulder Comments   Pt weak and deconditioned B scapular stabilizers which is contributing to L shoulder pain      Flowsheet Rows      Most Recent Value   PT/OT G-Codes   Current Score  65   Projected Score  74   Assessment Type  Re-evaluation   G code set  Changing & Maintaining Body Position   Changing and Maintaining Body Position Current Status ()  CJ   Changing and Maintaining Body Position Goal Status ()  CJ          Precautions: R labral shoulder tear 2007, HBP, vertigo, TMJ, L shoulder calcific tendonitis    Daily Treatment Diary     Manual  9/27/18 10/23/18 11/21/18          PROM L shoulder  PROM L shoulder PROM L shoulder          STM L shoulder  3 min 3 min Posterior & Inferior mobs  Grade III-IV Grade III-IV          Scapular Mobs   3 min             20min 15 min               Exercise Diary  9/27/18 10/23/18 11/21/18          scap retraction TB             Shoulder ext TB             Nu step arms only  10"           Wall climbs with scap retraction x10 hold 10            cervical SB stretch x10 hold 10            Prone scap flexion NV            pec stretch x5 hold 10 10 x 10 "           Row Machine  2 x 10 60#           Lat Pulldowns  2 x 10  60#           Prone scap flexion  2 x 10 5" hold 2#           Anterior Raises  2 x 10 3#           Lateral Raises  2 x 10 3#           Strength/ROM/Special Tests  15 min 20 min          Cane Flexion   6 x 10"          Cane Abduction   6 x 10"          Prone Ts, Ys, Is   0# for Ts and Ys and 2# I's 1 x 10 each          Supine Punches   1 x 10 3" hold 2#                                                     Modalities  9/27/18 10/23/18 11/21/18          CP  10'' post tx 10'' post tx  6             MHP   10' pre tx while on UBE

## 2018-11-24 DIAGNOSIS — I10 BENIGN ESSENTIAL HYPERTENSION: ICD-10-CM

## 2018-11-25 RX ORDER — OLMESARTAN MEDOXOMIL 20 MG/1
TABLET ORAL
Qty: 90 TABLET | Refills: 1 | Status: SHIPPED | OUTPATIENT
Start: 2018-11-25 | End: 2018-11-30 | Stop reason: SDUPTHER

## 2018-11-30 ENCOUNTER — OFFICE VISIT (OUTPATIENT)
Dept: FAMILY MEDICINE CLINIC | Facility: CLINIC | Age: 43
End: 2018-11-30
Payer: COMMERCIAL

## 2018-11-30 VITALS
HEIGHT: 67 IN | RESPIRATION RATE: 16 BRPM | BODY MASS INDEX: 27.62 KG/M2 | TEMPERATURE: 97 F | SYSTOLIC BLOOD PRESSURE: 118 MMHG | WEIGHT: 176 LBS | DIASTOLIC BLOOD PRESSURE: 80 MMHG | HEART RATE: 56 BPM

## 2018-11-30 DIAGNOSIS — Z13.89 SCREENING FOR CARDIOVASCULAR, RESPIRATORY, AND GENITOURINARY DISEASES: ICD-10-CM

## 2018-11-30 DIAGNOSIS — Z12.5 PROSTATE CANCER SCREENING: ICD-10-CM

## 2018-11-30 DIAGNOSIS — I10 BENIGN ESSENTIAL HYPERTENSION: ICD-10-CM

## 2018-11-30 DIAGNOSIS — J30.9 ALLERGIC RHINITIS, UNSPECIFIED SEASONALITY, UNSPECIFIED TRIGGER: ICD-10-CM

## 2018-11-30 DIAGNOSIS — Z13.83 SCREENING FOR CARDIOVASCULAR, RESPIRATORY, AND GENITOURINARY DISEASES: ICD-10-CM

## 2018-11-30 DIAGNOSIS — Z13.1 SCREENING FOR DIABETES MELLITUS (DM): ICD-10-CM

## 2018-11-30 DIAGNOSIS — Z00.00 HEALTHCARE MAINTENANCE: Primary | ICD-10-CM

## 2018-11-30 DIAGNOSIS — Z13.6 SCREENING FOR CARDIOVASCULAR, RESPIRATORY, AND GENITOURINARY DISEASES: ICD-10-CM

## 2018-11-30 DIAGNOSIS — Z23 IMMUNIZATION DUE: ICD-10-CM

## 2018-11-30 PROCEDURE — 99396 PREV VISIT EST AGE 40-64: CPT | Performed by: FAMILY MEDICINE

## 2018-11-30 PROCEDURE — 90471 IMMUNIZATION ADMIN: CPT

## 2018-11-30 PROCEDURE — 90472 IMMUNIZATION ADMIN EACH ADD: CPT

## 2018-11-30 PROCEDURE — 90715 TDAP VACCINE 7 YRS/> IM: CPT

## 2018-11-30 PROCEDURE — 90686 IIV4 VACC NO PRSV 0.5 ML IM: CPT

## 2018-11-30 RX ORDER — OLMESARTAN MEDOXOMIL 20 MG/1
20 TABLET ORAL DAILY
Qty: 90 TABLET | Refills: 1 | Status: SHIPPED | OUTPATIENT
Start: 2018-11-30 | End: 2019-05-13

## 2018-11-30 RX ORDER — FLUTICASONE PROPIONATE 50 MCG
2 SPRAY, SUSPENSION (ML) NASAL DAILY
Qty: 48 G | Refills: 3 | Status: SHIPPED | OUTPATIENT
Start: 2018-11-30 | End: 2019-07-31 | Stop reason: SDUPTHER

## 2018-11-30 NOTE — PROGRESS NOTES
Assessment/Plan:    No problem-specific Assessment & Plan notes found for this encounter  cpe today  htn stable  q6m f/u  NICHO normal  Labs pending  adacel given     Diagnoses and all orders for this visit:    Healthcare maintenance    Benign essential hypertension  -     olmesartan (BENICAR) 20 mg tablet; Take 1 tablet (20 mg total) by mouth daily    Immunization due  -     TDAP VACCINE GREATER THAN OR EQUAL TO 8YO IM  -     influenza vaccine, [id number], quadrivalent, 0 5 mL, preservative-free (SYRINGE, SINGLE-DOSE VIAL), for adult and pediatric patients 3 yr+ (AFLURIA, FLUARIX, FLULAVAL, FLUZONE)    Screening for cardiovascular, respiratory, and genitourinary diseases  -     Lipid Panel with Direct LDL reflex; Future    Screening for diabetes mellitus (DM)  -     Comprehensive metabolic panel; Future    Prostate cancer screening  -     PSA, Total Screen; Future    Allergic rhinitis, unspecified seasonality, unspecified trigger  -     fluticasone (FLONASE) 50 mcg/act nasal spray; 2 sprays into each nostril daily              Return in about 6 months (around 5/30/2019) for Recheck  Subjective:      Patient ID: Todd Riley is a 44 y o  male  Chief Complaint   Patient presents with    Annual Exam     Henry Muro LPN       HPI    Tolerating meds  flonase otc helps  fam hx of prostate CA  No cp or sob  Diet good  Exercises regularly  2 kids ages 3 and 1  Does not recall adacel     The following portions of the patient's history were reviewed and updated as appropriate: allergies, current medications, past family history, past medical history, past social history, past surgical history and problem list     Review of Systems   Respiratory: Negative for shortness of breath  Cardiovascular: Negative for chest pain  Neurological: Negative for dizziness           Current Outpatient Prescriptions   Medication Sig Dispense Refill    Ascorbic Acid (VITAMIN C) 100 MG tablet Take 1 tablet by mouth daily      cetirizine (ZYRTEC ALLERGY) 10 mg tablet Take 10 mg by mouth daily      cholecalciferol (VITAMIN D3) 1,000 units tablet Take 1 tablet by mouth daily      fluticasone (FLONASE) 50 mcg/act nasal spray 2 sprays into each nostril daily 48 g 3    olmesartan (BENICAR) 20 mg tablet Take 1 tablet (20 mg total) by mouth daily 90 tablet 1    Omega-3 Fatty Acids (FISH OIL) 645 MG CAPS Take 1 capsule by mouth daily      vitamin A 10,000 units capsule Take 1 tablet by mouth daily      VITAMIN B COMPLEX-C PO Take 1 capsule by mouth daily      Vitamin E 400 units TABS Take 1 tablet by mouth daily       No current facility-administered medications for this visit  Objective:    /80   Pulse 56   Temp (!) 97 °F (36 1 °C)   Resp 16   Ht 5' 7" (1 702 m)   Wt 79 8 kg (176 lb)   BMI 27 57 kg/m²        Physical Exam   Constitutional: He appears well-developed  HENT:   Head: Normocephalic  Mouth/Throat: No oropharyngeal exudate  Eyes: Conjunctivae are normal  No scleral icterus  Neck: Neck supple  Cardiovascular: Normal rate and intact distal pulses  No murmur heard  Pulmonary/Chest: Effort normal  No respiratory distress  He has no wheezes  Abdominal: Soft  He exhibits no mass  There is no tenderness  There is no rebound and no guarding  Hernia confirmed negative in the right inguinal area and confirmed negative in the left inguinal area  Genitourinary: Prostate normal and penis normal    Musculoskeletal: He exhibits no edema or deformity  Lymphadenopathy:     He has no cervical adenopathy  Neurological: He is alert  He exhibits normal muscle tone  Skin: Skin is warm and dry  Rash noted  No pallor  Macular rash face, from dryness per pt   Psychiatric: His behavior is normal  Thought content normal    Nursing note and vitals reviewed               Marcie Medina DO

## 2018-12-01 LAB
ALBUMIN SERPL-MCNC: 4.8 G/DL (ref 3.5–5.5)
ALBUMIN/GLOB SERPL: 1.9 {RATIO} (ref 1.2–2.2)
ALP SERPL-CCNC: 66 IU/L (ref 39–117)
ALT SERPL-CCNC: 21 IU/L (ref 0–44)
AST SERPL-CCNC: 20 IU/L (ref 0–40)
BILIRUB SERPL-MCNC: 0.9 MG/DL (ref 0–1.2)
BUN SERPL-MCNC: 30 MG/DL (ref 6–24)
BUN/CREAT SERPL: 28 (ref 9–20)
CALCIUM SERPL-MCNC: 9.2 MG/DL (ref 8.7–10.2)
CHLORIDE SERPL-SCNC: 101 MMOL/L (ref 96–106)
CHOLEST SERPL-MCNC: 202 MG/DL (ref 100–199)
CO2 SERPL-SCNC: 25 MMOL/L (ref 20–29)
CREAT SERPL-MCNC: 1.06 MG/DL (ref 0.76–1.27)
GLOBULIN SER-MCNC: 2.5 G/DL (ref 1.5–4.5)
GLUCOSE SERPL-MCNC: 94 MG/DL (ref 65–99)
HDLC SERPL-MCNC: 46 MG/DL
LABCORP COMMENT: NORMAL
LDLC SERPL CALC-MCNC: 142 MG/DL (ref 0–99)
MICRODELETION SYND BLD/T FISH: NORMAL
POTASSIUM SERPL-SCNC: 4 MMOL/L (ref 3.5–5.2)
PROT SERPL-MCNC: 7.3 G/DL (ref 6–8.5)
PSA SERPL-MCNC: 0.8 NG/ML (ref 0–4)
SL AMB EGFR AFRICAN AMERICAN: 100 ML/MIN/1.73
SL AMB EGFR NON AFRICAN AMERICAN: 86 ML/MIN/1.73
SODIUM SERPL-SCNC: 140 MMOL/L (ref 134–144)
TRIGL SERPL-MCNC: 71 MG/DL (ref 0–149)

## 2019-04-16 ENCOUNTER — OFFICE VISIT (OUTPATIENT)
Dept: FAMILY MEDICINE CLINIC | Facility: CLINIC | Age: 44
End: 2019-04-16
Payer: COMMERCIAL

## 2019-04-16 VITALS
HEIGHT: 67 IN | TEMPERATURE: 98.4 F | WEIGHT: 174 LBS | HEART RATE: 64 BPM | SYSTOLIC BLOOD PRESSURE: 110 MMHG | RESPIRATION RATE: 16 BRPM | DIASTOLIC BLOOD PRESSURE: 80 MMHG | BODY MASS INDEX: 27.31 KG/M2
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
## 2019-04-16 DIAGNOSIS — I10 BENIGN ESSENTIAL HYPERTENSION: Primary | ICD-10-CM

## 2019-04-16 PROCEDURE — 99213 OFFICE O/P EST LOW 20 MIN: CPT | Performed by: FAMILY MEDICINE

## 2019-04-16 RX ORDER — DOXYCYCLINE 100 MG/1
CAPSULE ORAL
Qty: 2 CAPSULE | Refills: 0 | Status: SHIPPED | OUTPATIENT
Start: 2019-04-16 | End: 2019-04-17

## 2019-05-10 ENCOUNTER — TELEPHONE (OUTPATIENT)
Dept: FAMILY MEDICINE CLINIC | Facility: CLINIC | Age: 44
End: 2019-05-10

## 2019-05-10 DIAGNOSIS — I10 BENIGN ESSENTIAL HYPERTENSION: Primary | ICD-10-CM

## 2019-05-13 RX ORDER — TELMISARTAN 20 MG/1
20 TABLET ORAL DAILY
Qty: 90 TABLET | Refills: 0 | Status: SHIPPED | OUTPATIENT
Start: 2019-05-13 | End: 2019-07-31

## 2019-05-30 LAB
ALBUMIN SERPL-MCNC: 4.5 G/DL (ref 3.5–5.5)
ALBUMIN/GLOB SERPL: 2 {RATIO} (ref 1.2–2.2)
ALP SERPL-CCNC: 71 IU/L (ref 39–117)
ALT SERPL-CCNC: 17 IU/L (ref 0–44)
AST SERPL-CCNC: 25 IU/L (ref 0–40)
B BURGDOR IGG+IGM SER-ACNC: <0.91 ISR (ref 0–0.9)
B BURGDOR IGM SER IA-ACNC: <0.8 INDEX (ref 0–0.79)
BILIRUB SERPL-MCNC: 0.3 MG/DL (ref 0–1.2)
BUN SERPL-MCNC: 22 MG/DL (ref 6–24)
BUN/CREAT SERPL: 21 (ref 9–20)
CALCIUM SERPL-MCNC: 9.4 MG/DL (ref 8.7–10.2)
CHLORIDE SERPL-SCNC: 102 MMOL/L (ref 96–106)
CO2 SERPL-SCNC: 25 MMOL/L (ref 20–29)
CREAT SERPL-MCNC: 1.07 MG/DL (ref 0.76–1.27)
GLOBULIN SER-MCNC: 2.3 G/DL (ref 1.5–4.5)
GLUCOSE SERPL-MCNC: 106 MG/DL (ref 65–99)
LABCORP COMMENT: NORMAL
POTASSIUM SERPL-SCNC: 4.3 MMOL/L (ref 3.5–5.2)
PROT SERPL-MCNC: 6.8 G/DL (ref 6–8.5)
SL AMB EGFR AFRICAN AMERICAN: 98 ML/MIN/1.73
SL AMB EGFR NON AFRICAN AMERICAN: 85 ML/MIN/1.73
SODIUM SERPL-SCNC: 139 MMOL/L (ref 134–144)

## 2019-06-05 ENCOUNTER — OFFICE VISIT (OUTPATIENT)
Dept: FAMILY MEDICINE CLINIC | Facility: CLINIC | Age: 44
End: 2019-06-05

## 2019-06-05 VITALS
HEIGHT: 67 IN | DIASTOLIC BLOOD PRESSURE: 80 MMHG | HEART RATE: 60 BPM | WEIGHT: 167 LBS | TEMPERATURE: 97.7 F | BODY MASS INDEX: 26.21 KG/M2 | RESPIRATION RATE: 16 BRPM | SYSTOLIC BLOOD PRESSURE: 120 MMHG

## 2019-06-05 DIAGNOSIS — Z13.6 SCREENING FOR CARDIOVASCULAR, RESPIRATORY, AND GENITOURINARY DISEASES: ICD-10-CM

## 2019-06-05 DIAGNOSIS — Z13.89 SCREENING FOR CARDIOVASCULAR, RESPIRATORY, AND GENITOURINARY DISEASES: ICD-10-CM

## 2019-06-05 DIAGNOSIS — Z13.1 SCREENING FOR DIABETES MELLITUS (DM): ICD-10-CM

## 2019-06-05 DIAGNOSIS — I10 BENIGN ESSENTIAL HYPERTENSION: Primary | ICD-10-CM

## 2019-06-05 DIAGNOSIS — Z13.83 SCREENING FOR CARDIOVASCULAR, RESPIRATORY, AND GENITOURINARY DISEASES: ICD-10-CM

## 2019-06-05 DIAGNOSIS — R73.01 IFG (IMPAIRED FASTING GLUCOSE): ICD-10-CM

## 2019-06-05 DIAGNOSIS — Z12.5 PROSTATE CANCER SCREENING: ICD-10-CM

## 2019-06-05 PROCEDURE — 3008F BODY MASS INDEX DOCD: CPT | Performed by: FAMILY MEDICINE

## 2019-06-05 PROCEDURE — 99213 OFFICE O/P EST LOW 20 MIN: CPT | Performed by: FAMILY MEDICINE

## 2019-06-05 PROCEDURE — 3074F SYST BP LT 130 MM HG: CPT | Performed by: FAMILY MEDICINE

## 2019-06-05 PROCEDURE — 3079F DIAST BP 80-89 MM HG: CPT | Performed by: FAMILY MEDICINE

## 2019-06-05 PROCEDURE — 1036F TOBACCO NON-USER: CPT | Performed by: FAMILY MEDICINE

## 2019-07-31 ENCOUNTER — OFFICE VISIT (OUTPATIENT)
Dept: FAMILY MEDICINE CLINIC | Facility: CLINIC | Age: 44
End: 2019-07-31
Payer: COMMERCIAL

## 2019-07-31 VITALS
HEIGHT: 67 IN | RESPIRATION RATE: 18 BRPM | DIASTOLIC BLOOD PRESSURE: 86 MMHG | SYSTOLIC BLOOD PRESSURE: 142 MMHG | TEMPERATURE: 98.2 F | HEART RATE: 64 BPM | WEIGHT: 169.6 LBS | BODY MASS INDEX: 26.62 KG/M2

## 2019-07-31 DIAGNOSIS — J30.9 ALLERGIC RHINITIS, UNSPECIFIED SEASONALITY, UNSPECIFIED TRIGGER: ICD-10-CM

## 2019-07-31 DIAGNOSIS — I10 BENIGN ESSENTIAL HYPERTENSION: Primary | ICD-10-CM

## 2019-07-31 DIAGNOSIS — R73.01 IFG (IMPAIRED FASTING GLUCOSE): ICD-10-CM

## 2019-07-31 PROBLEM — M26.629 TMJ SYNDROME: Status: RESOLVED | Noted: 2017-11-30 | Resolved: 2019-07-31
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
## 2019-07-31 PROCEDURE — 3008F BODY MASS INDEX DOCD: CPT | Performed by: FAMILY MEDICINE

## 2019-07-31 PROCEDURE — 1036F TOBACCO NON-USER: CPT | Performed by: FAMILY MEDICINE

## 2019-07-31 PROCEDURE — 99214 OFFICE O/P EST MOD 30 MIN: CPT | Performed by: FAMILY MEDICINE

## 2019-07-31 RX ORDER — OLMESARTAN MEDOXOMIL 20 MG/1
20 TABLET ORAL DAILY
Qty: 90 TABLET | Refills: 1 | Status: SHIPPED | OUTPATIENT
Start: 2019-07-31 | End: 2019-08-02

## 2019-07-31 RX ORDER — OLMESARTAN MEDOXOMIL 20 MG/1
20 TABLET ORAL DAILY
Qty: 90 TABLET | Refills: 1 | Status: SHIPPED | OUTPATIENT
Start: 2019-07-31 | End: 2019-07-31 | Stop reason: SDUPTHER

## 2019-07-31 RX ORDER — CETIRIZINE HYDROCHLORIDE 10 MG/1
10 TABLET ORAL DAILY
Qty: 90 TABLET | Refills: 1 | Status: SHIPPED | OUTPATIENT
Start: 2019-07-31 | End: 2021-05-10 | Stop reason: ALTCHOICE

## 2019-07-31 RX ORDER — FLUTICASONE PROPIONATE 50 MCG
2 SPRAY, SUSPENSION (ML) NASAL DAILY
Qty: 3 BOTTLE | Refills: 3 | Status: SHIPPED | OUTPATIENT
Start: 2019-07-31

## 2019-07-31 NOTE — PROGRESS NOTES
Assessment/Plan:    No problem-specific Assessment & Plan notes found for this encounter  Will switch back to olmesartan 20mg if available and he will make sure bp good at home, then routine f/u November  If olmesartan not available, might offer losartan 50mg/d and tolerated check bp here in 2-3w unless reliably measured elsewhere  If diarrhea persists despite changes, may need colonoscopy    ifg aware  AR stable     Diagnoses and all orders for this visit:    Benign essential hypertension  -     Discontinue: olmesartan (BENICAR) 20 mg tablet; Take 1 tablet (20 mg total) by mouth daily  -     olmesartan (BENICAR) 20 mg tablet; Take 1 tablet (20 mg total) by mouth daily    Allergic rhinitis, unspecified seasonality, unspecified trigger  -     fluticasone (FLONASE) 50 mcg/act nasal spray; 2 sprays into each nostril daily  -     cetirizine (ZYRTEC ALLERGY) 10 mg tablet; Take 1 tablet (10 mg total) by mouth daily    IFG (impaired fasting glucose)        Return in about 6 months (around 1/31/2020), or if symptoms worsen or fail to improve  Subjective:      Patient ID: Eryn Zhong is a 38 y o  male  Chief Complaint   Patient presents with    Follow-up     [facility] lpn       HPI  Cramps in legs, diarrhea since telmisartan mid May  Was on olmesartan  bp 130-140/90, better when on olmesartan  Tired  Sleeping more  Legs crampy   No cough or tongue swelling    The following portions of the patient's history were reviewed and updated as appropriate: allergies, current medications, past family history, past medical history, past social history, past surgical history and problem list     Review of Systems   Respiratory: Negative for shortness of breath  Cardiovascular: Negative for chest pain  Skin: Negative for rash           Current Outpatient Medications   Medication Sig Dispense Refill    Ascorbic Acid (VITAMIN C) 100 MG tablet Take 1 tablet by mouth daily      cetirizine (ZYRTEC ALLERGY) 10 mg tablet Take 1 tablet (10 mg total) by mouth daily 90 tablet 1    cholecalciferol (VITAMIN D3) 1,000 units tablet Take 1 tablet by mouth daily      fluticasone (FLONASE) 50 mcg/act nasal spray 2 sprays into each nostril daily 3 Bottle 3    Omega-3 Fatty Acids (FISH OIL) 645 MG CAPS Take 1 capsule by mouth daily      vitamin A 10,000 units capsule Take 1 tablet by mouth daily      VITAMIN B COMPLEX-C PO Take 1 capsule by mouth daily      Vitamin E 400 units TABS Take 1 tablet by mouth daily      olmesartan (BENICAR) 20 mg tablet Take 1 tablet (20 mg total) by mouth daily 90 tablet 1     No current facility-administered medications for this visit  Objective:    /86   Pulse 64   Temp 98 2 °F (36 8 °C)   Resp 18   Ht 5' 7" (1 702 m)   Wt 76 9 kg (169 lb 9 6 oz)   BMI 26 56 kg/m²        Physical Exam   Constitutional: He appears well-developed  No distress  HENT:   Head: Normocephalic  Right Ear: External ear normal    Left Ear: External ear normal    Mouth/Throat: No oropharyngeal exudate  Eyes: Conjunctivae are normal  No scleral icterus  Neck: Neck supple  Cardiovascular: Normal rate, normal heart sounds and intact distal pulses  No murmur heard  Pulmonary/Chest: Effort normal and breath sounds normal  No respiratory distress  He has no wheezes  Abdominal: Soft  Bowel sounds are normal  He exhibits no distension and no mass  There is no tenderness  There is no guarding  Musculoskeletal: He exhibits no edema or deformity  Neurological: He is alert  He exhibits normal muscle tone  Skin: Skin is warm and dry  No rash noted  No pallor  Psychiatric: His behavior is normal  Thought content normal    Nursing note and vitals reviewed               Milind Kurtz DO

## 2019-08-02 ENCOUNTER — TELEPHONE (OUTPATIENT)
Dept: FAMILY MEDICINE CLINIC | Facility: CLINIC | Age: 44
End: 2019-08-02

## 2019-08-02 DIAGNOSIS — I10 BENIGN ESSENTIAL HYPERTENSION: Primary | ICD-10-CM

## 2019-08-02 RX ORDER — LOSARTAN POTASSIUM 50 MG/1
50 TABLET ORAL DAILY
Qty: 90 TABLET | Refills: 1 | Status: SHIPPED | OUTPATIENT
Start: 2019-08-02 | End: 2019-09-03

## 2019-08-02 NOTE — TELEPHONE ENCOUNTER
Please let him know I sent losartan 50mg to pharmacy and should schedule office appt for bp check in 3-4 weeks     (he did not tolerate telmisartan)

## 2019-08-02 NOTE — TELEPHONE ENCOUNTER
Benicar on back order  Need an alternative   Is Telmisartan 20mg  OK ?      [facility] [phone]    Josefina Hernandez MA

## 2019-08-29 ENCOUNTER — TELEPHONE (OUTPATIENT)
Dept: FAMILY MEDICINE CLINIC | Facility: CLINIC | Age: 44
End: 2019-08-29

## 2019-09-03 ENCOUNTER — OFFICE VISIT (OUTPATIENT)
Dept: FAMILY MEDICINE CLINIC | Facility: CLINIC | Age: 44
End: 2019-09-03
Payer: COMMERCIAL

## 2019-09-03 VITALS
HEART RATE: 52 BPM | RESPIRATION RATE: 16 BRPM | TEMPERATURE: 96.9 F | HEIGHT: 67 IN | WEIGHT: 168 LBS | DIASTOLIC BLOOD PRESSURE: 78 MMHG | BODY MASS INDEX: 26.37 KG/M2 | SYSTOLIC BLOOD PRESSURE: 112 MMHG

## 2019-09-03 DIAGNOSIS — I10 BENIGN ESSENTIAL HYPERTENSION: Primary | ICD-10-CM

## 2019-09-03 DIAGNOSIS — R73.01 IFG (IMPAIRED FASTING GLUCOSE): ICD-10-CM

## 2019-09-03 PROCEDURE — 99213 OFFICE O/P EST LOW 20 MIN: CPT | Performed by: FAMILY MEDICINE

## 2019-09-03 PROCEDURE — 3078F DIAST BP <80 MM HG: CPT | Performed by: FAMILY MEDICINE

## 2019-09-03 PROCEDURE — 3008F BODY MASS INDEX DOCD: CPT | Performed by: FAMILY MEDICINE

## 2019-09-03 PROCEDURE — 3074F SYST BP LT 130 MM HG: CPT | Performed by: FAMILY MEDICINE

## 2019-09-03 RX ORDER — OLMESARTAN MEDOXOMIL 20 MG/1
20 TABLET ORAL DAILY
Qty: 90 TABLET | Refills: 1 | Status: SHIPPED | OUTPATIENT
Start: 2019-09-03 | End: 2020-04-24

## 2019-09-03 NOTE — PROGRESS NOTES
Assessment/Plan:    No problem-specific Assessment & Plan notes found for this encounter  Cont meds, olmesartan 20mg  Keep appt Nov for labs and cpe  ifg aware     Diagnoses and all orders for this visit:    Benign essential hypertension  -     olmesartan (BENICAR) 20 mg tablet; Take 1 tablet (20 mg total) by mouth daily    IFG (impaired fasting glucose)          Return for Next scheduled follow up  Subjective:      Patient ID: Faustino Bond is a 38 y o  male  Chief Complaint   Patient presents with    Follow-up     3 month f/u [facility]    Blood Pressure Check       HPI  Back on benicar 20mg  1m ago  Had diarrhea due to telmisartan that went away  bp good at home  telmisartan did not work  Legs not weird anymore  Home readings good, mostly 120/80    The following portions of the patient's history were reviewed and updated as appropriate: allergies, current medications, past family history, past medical history, past social history, past surgical history and problem list     Review of Systems   Gastrointestinal: Negative for diarrhea  Musculoskeletal: Negative for myalgias  Current Outpatient Medications   Medication Sig Dispense Refill    Ascorbic Acid (VITAMIN C) 100 MG tablet Take 1 tablet by mouth daily      cetirizine (ZYRTEC ALLERGY) 10 mg tablet Take 1 tablet (10 mg total) by mouth daily 90 tablet 1    cholecalciferol (VITAMIN D3) 1,000 units tablet Take 1 tablet by mouth daily      fluticasone (FLONASE) 50 mcg/act nasal spray 2 sprays into each nostril daily 3 Bottle 3    Omega-3 Fatty Acids (FISH OIL) 645 MG CAPS Take 1 capsule by mouth daily      vitamin A 10,000 units capsule Take 1 tablet by mouth daily      VITAMIN B COMPLEX-C PO Take 1 capsule by mouth daily      Vitamin E 400 units TABS Take 1 tablet by mouth daily      olmesartan (BENICAR) 20 mg tablet Take 1 tablet (20 mg total) by mouth daily 90 tablet 1     No current facility-administered medications for this visit  Objective:    /78   Pulse (!) 52   Temp (!) 96 9 °F (36 1 °C)   Resp 16   Ht 5' 7" (1 702 m)   Wt 76 2 kg (168 lb)   BMI 26 31 kg/m²        Physical Exam   Constitutional: He appears well-developed  No distress  HENT:   Head: Normocephalic  Right Ear: External ear normal    Left Ear: External ear normal    Mouth/Throat: No oropharyngeal exudate  Eyes: Conjunctivae are normal  No scleral icterus  Neck: Neck supple  Cardiovascular: Normal rate, regular rhythm, normal heart sounds and intact distal pulses  No murmur heard  Pulmonary/Chest: Effort normal and breath sounds normal  No respiratory distress  Abdominal: Soft  Musculoskeletal: He exhibits no edema or deformity  Neurological: He is alert  Skin: Skin is warm and dry  No pallor  Psychiatric: His behavior is normal  Thought content normal    Nursing note and vitals reviewed               Tyshawn Sarkar DO

## 2019-11-26 ENCOUNTER — OFFICE VISIT (OUTPATIENT)
Dept: URGENT CARE | Facility: CLINIC | Age: 44
End: 2019-11-26
Payer: COMMERCIAL

## 2019-11-26 VITALS
OXYGEN SATURATION: 97 % | HEART RATE: 63 BPM | SYSTOLIC BLOOD PRESSURE: 124 MMHG | TEMPERATURE: 98.3 F | BODY MASS INDEX: 26.52 KG/M2 | RESPIRATION RATE: 16 BRPM | HEIGHT: 68 IN | WEIGHT: 175 LBS | DIASTOLIC BLOOD PRESSURE: 82 MMHG

## 2019-11-26 DIAGNOSIS — J02.9 SORE THROAT: Primary | ICD-10-CM

## 2019-11-26 LAB — S PYO AG THROAT QL: NEGATIVE

## 2019-11-26 PROCEDURE — 99213 OFFICE O/P EST LOW 20 MIN: CPT | Performed by: PREVENTIVE MEDICINE

## 2019-11-26 PROCEDURE — 87880 STREP A ASSAY W/OPTIC: CPT | Performed by: PREVENTIVE MEDICINE

## 2019-11-26 RX ORDER — AMOXICILLIN 500 MG/1
500 TABLET, FILM COATED ORAL 2 TIMES DAILY
Qty: 20 TABLET | Refills: 0 | Status: SHIPPED | OUTPATIENT
Start: 2019-11-26 | End: 2019-12-06

## 2019-11-26 NOTE — PATIENT INSTRUCTIONS
Pharyngitis   AMBULATORY CARE:   Pharyngitis , or sore throat, is inflammation of the tissues and structures in your pharynx (throat)  Pharyngitis is most often caused by bacteria  It may also be caused by a cold or flu virus  Other causes include smoking, allergies, or acid reflux  Signs and symptoms that may occur with pharyngitis:   · Sore throat or pain when you swallow    · Fever, chills, and body aches    · Hoarse or raspy voice    · Cough, runny or stuffy nose, itchy or watery eyes    · Headache    · Upset stomach and loss of appetite    · Mild neck stiffness    · Swollen glands that feel like hard lumps when you touch your neck    · White and yellow pus-filled blisters in the back of your throat  Call 911 for any of the following:   · You have trouble breathing or swallowing because your throat is swollen or sore  Seek care immediately if:   · You are drooling because it hurts too much to swallow  · Your fever is higher than 102? F (39?C) or lasts longer than 3 days  · You are confused  · You taste blood in your throat  Contact your healthcare provider if:   · Your throat pain gets worse  · You have a painful lump in your throat that does not go away after 5 days  · Your symptoms do not improve after 5 days  · You have questions or concerns about your condition or care  Treatment for pharyngitis:  Viral pharyngitis will go away on its own without treatment  Your sore throat should start to feel better in 3 to 5 days for both viral and bacterial infections  You may need any of the following:  · Antibiotics  treat a bacterial infection  · NSAIDs , such as ibuprofen, help decrease swelling, pain, and fever  NSAIDs can cause stomach bleeding or kidney problems in certain people  If you take blood thinner medicine, always ask your healthcare provider if NSAIDs are safe for you  Always read the medicine label and follow directions  · Acetaminophen  decreases pain and fever   It is available without a doctor's order  Ask how much to take and how often to take it  Follow directions  Acetaminophen can cause liver damage if not taken correctly  Manage your symptoms:   · Gargle salt water  Mix ¼ teaspoon salt in an 8 ounce glass of warm water and gargle  This may help decrease swelling in your throat  · Drink liquids as directed  You may need to drink more liquids than usual  Liquids may help soothe your throat and prevent dehydration  Ask how much liquid to drink each day and which liquids are best for you  · Use a cool-steam humidifier  to help moisten the air in your room and calm your cough  · Soothe your throat  with cough drops, ice, soft foods, or popsicles  Prevent the spread of pharyngitis:  Cover your mouth and nose when you cough or sneeze  Do not share food or drinks  Wash your hands often  Use soap and water  If soap and water are unavailable, use an alcohol based hand   Follow up with your healthcare provider as directed:  Write down your questions so you remember to ask them during your visits  © 2017 [facility] Information is for End User's use only and may not be sold, redistributed or otherwise used for commercial purposes  All illustrations and images included in CareNotes® are the copyrighted property of [employer]  or Raul Cruz  The above information is an  only  It is not intended as medical advice for individual conditions or treatments  Talk to your doctor, nurse or pharmacist before following any medical regimen to see if it is safe and effective for you

## 2019-11-27 NOTE — PROGRESS NOTES
[address] [facility] [facility] [employer]        NAME: Milad Bean is a 38 y o  male  : 1975    MRN: [id number]  DATE: 2019  TIME: 7:40 PM    Assessment and Plan   Sore throat [J02 9]  1  Sore throat  POCT rapid strepA    amoxicillin (AMOXIL) 500 MG tablet         Patient Instructions       Follow up with PCP in 3-5 days  Proceed to  ER if symptoms worsen  Chief Complaint     Chief Complaint   Patient presents with    Cold Like Symptoms     ear ache left,cough non productive, sore throat         History of Present Illness       Sore Throat    This is a new problem  The current episode started 1 to 4 weeks ago  The problem has been gradually worsening  There has been no fever  The pain is at a severity of 5/10  The pain is moderate  Associated symptoms include congestion, ear pain and swollen glands  Pertinent negatives include no abdominal pain, diarrhea or vomiting  He has tried nothing for the symptoms  The treatment provided no relief  Review of Systems   Review of Systems   Constitutional: Negative for chills, fatigue and fever  HENT: Positive for congestion, ear pain, sinus pressure, sinus pain and sore throat  Cardiovascular: Negative for chest pain, palpitations and leg swelling  Gastrointestinal: Negative for abdominal pain, diarrhea, nausea and vomiting  Endocrine: Negative  Genitourinary: Negative  Musculoskeletal: Negative  Skin: Negative for pallor and rash  Allergic/Immunologic: Negative  Neurological: Negative  Hematological: Negative  Psychiatric/Behavioral: Negative            Current Medications       Current Outpatient Medications:     Ascorbic Acid (VITAMIN C) 100 MG tablet, Take 1 tablet by mouth daily, Disp: , Rfl:     cetirizine (ZYRTEC ALLERGY) 10 mg tablet, Take 1 tablet (10 mg total) by mouth daily, Disp: 90 tablet, Rfl: 1    cholecalciferol (VITAMIN D3) 1,000 units tablet, Take 1 tablet by mouth daily, Disp: , Rfl:     fluticasone (FLONASE) 50 mcg/act nasal spray, 2 sprays into each nostril daily, Disp: 3 Bottle, Rfl: 3    olmesartan (BENICAR) 20 mg tablet, Take 1 tablet (20 mg total) by mouth daily, Disp: 90 tablet, Rfl: 1    Omega-3 Fatty Acids (FISH OIL) 645 MG CAPS, Take 1 capsule by mouth daily, Disp: , Rfl:     vitamin A 10,000 units capsule, Take 1 tablet by mouth daily, Disp: , Rfl:     VITAMIN B COMPLEX-C PO, Take 1 capsule by mouth daily, Disp: , Rfl:     Vitamin E 400 units TABS, Take 1 tablet by mouth daily, Disp: , Rfl:     amoxicillin (AMOXIL) 500 MG tablet, Take 1 tablet (500 mg total) by mouth 2 (two) times a day for 10 days, Disp: 20 tablet, Rfl: 0    Current Allergies     Allergies as of 11/26/2019 - Reviewed 11/26/2019   Allergen Reaction Noted    Bee venom  06/12/2014    Flomax [tamsulosin hcl] Other (See Comments) 07/30/2016    Lisinopril  11/18/2014    Pollen extract  06/12/2014    Tamsulosin Other (See Comments) 12/30/2016            The following portions of the patient's history were reviewed and updated as appropriate: allergies, current medications, past family history, past medical history, past social history, past surgical history and problem list      Past Medical History:   Diagnosis Date    Corneal abrasion     Resolved 8/23/2016     Difficult or painful urination     Resolved 8/23/2016     Hypertension     IFG (impaired fasting glucose)     Resolved 9/18/2017     Prediabetes     Resolved 9/18/2017     Vertigo        Past Surgical History:   Procedure Laterality Date    APPENDECTOMY      HERNIA REPAIR      KNEE SURGERY Right 2009    Lateral release     [facility] LAP,APPENDECTOMY N/A 7/30/2016    Procedure: APPENDECTOMY LAPAROSCOPIC;  Surgeon: Pari Monterroso MD;  Location: [facility];  Service: General    SHOULDER SURGERY Right 2006    SLAP       Family History   Problem Relation Age of Onset    Coronary artery disease Father     Prostate cancer Father     Hypertension Family     Lymphoma Family     Skin cancer Family          Medications have been verified  Objective   /82   Pulse 63   Temp 98 3 °F (36 8 °C)   Resp 16   Ht 5' 7 5" (1 715 m)   Wt 79 4 kg (175 lb)   SpO2 97%   BMI 27 00 kg/m²        Physical Exam     Physical Exam   Constitutional: He appears well-developed and well-nourished  HENT:   Head: Normocephalic  Right Ear: External ear normal    Left Ear: External ear normal    Nose: Mucosal edema and rhinorrhea present  Right sinus exhibits maxillary sinus tenderness and frontal sinus tenderness  Mouth/Throat: Posterior oropharyngeal erythema present  No oropharyngeal exudate or posterior oropharyngeal edema  Neck: Normal range of motion  Cardiovascular: Normal rate, regular rhythm and normal heart sounds  Pulmonary/Chest: Effort normal  He has no wheezes  Lymphadenopathy:     He has no cervical adenopathy  Skin: Skin is warm  No rash noted  No pallor  Nursing note and vitals reviewed

## 2020-03-02 ENCOUNTER — OFFICE VISIT (OUTPATIENT)
Dept: FAMILY MEDICINE CLINIC | Facility: CLINIC | Age: 45
End: 2020-03-02
Payer: COMMERCIAL

## 2020-03-02 VITALS
WEIGHT: 168 LBS | BODY MASS INDEX: 25.46 KG/M2 | SYSTOLIC BLOOD PRESSURE: 110 MMHG | HEART RATE: 68 BPM | RESPIRATION RATE: 16 BRPM | OXYGEN SATURATION: 98 % | HEIGHT: 68 IN | TEMPERATURE: 97.9 F | DIASTOLIC BLOOD PRESSURE: 60 MMHG

## 2020-03-02 DIAGNOSIS — J30.9 ALLERGIC RHINITIS, UNSPECIFIED SEASONALITY, UNSPECIFIED TRIGGER: ICD-10-CM

## 2020-03-02 DIAGNOSIS — I10 BENIGN ESSENTIAL HYPERTENSION: ICD-10-CM

## 2020-03-02 DIAGNOSIS — R19.7 DIARRHEA, UNSPECIFIED TYPE: Primary | ICD-10-CM

## 2020-03-02 PROCEDURE — 3008F BODY MASS INDEX DOCD: CPT | Performed by: FAMILY MEDICINE

## 2020-03-02 PROCEDURE — 3074F SYST BP LT 130 MM HG: CPT | Performed by: FAMILY MEDICINE

## 2020-03-02 PROCEDURE — 3078F DIAST BP <80 MM HG: CPT | Performed by: FAMILY MEDICINE

## 2020-03-02 PROCEDURE — 1036F TOBACCO NON-USER: CPT | Performed by: FAMILY MEDICINE

## 2020-03-02 PROCEDURE — 99214 OFFICE O/P EST MOD 30 MIN: CPT | Performed by: FAMILY MEDICINE

## 2020-03-02 RX ORDER — CIPROFLOXACIN 500 MG/1
500 TABLET, FILM COATED ORAL 2 TIMES DAILY
Qty: 10 TABLET | Refills: 0 | Status: SHIPPED | OUTPATIENT
Start: 2020-03-02 | End: 2020-03-07

## 2020-03-02 NOTE — PROGRESS NOTES
Assessment/Plan:    No problem-specific Assessment & Plan notes found for this encounter  Diarrhea new, prolonged  R/o infection  Stool c/s and abx but consider GI eval if no better or recurrent  No rgr    htn stable  Allergies stable     Diagnoses and all orders for this visit:    Diarrhea, unspecified type  -     ciprofloxacin (CIPRO) 500 mg tablet; Take 1 tablet (500 mg total) by mouth 2 (two) times a day for 5 days  -     Stool Enteric Bacterial Panel by PCR; Future    Benign essential hypertension    Allergic rhinitis, unspecified seasonality, unspecified trigger        Can start abx after providing specimen at lab      Return if symptoms worsen or fail to improve  Subjective:      Patient ID: Milad Bean is a 41 y o  male  Chief Complaint   Patient presents with    Diarrhea     for a few weeks now, [id number]        HPI  Diarrhea  Few wks  Watery  Light brown  Blood one time at onset but not since  Usual bm 2x/d  5-6x/d lately  No food changes  No one at home with same  No travel  Last abx November 2019  No diet changes  Some llq pain but not specific    The following portions of the patient's history were reviewed and updated as appropriate: allergies, current medications, past family history, past medical history, past social history, past surgical history and problem list     Review of Systems   Constitutional: Negative for fever  Respiratory: Negative for shortness of breath  Gastrointestinal: Negative for abdominal distention           Current Outpatient Medications   Medication Sig Dispense Refill    cetirizine (ZYRTEC ALLERGY) 10 mg tablet Take 1 tablet (10 mg total) by mouth daily 90 tablet 1    fluticasone (FLONASE) 50 mcg/act nasal spray 2 sprays into each nostril daily 3 Bottle 3    olmesartan (BENICAR) 20 mg tablet Take 1 tablet (20 mg total) by mouth daily 90 tablet 1    Ascorbic Acid (VITAMIN C) 100 MG tablet Take 1 tablet by mouth daily      cholecalciferol (VITAMIN D3) 1,000 units tablet Take 1 tablet by mouth daily      ciprofloxacin (CIPRO) 500 mg tablet Take 1 tablet (500 mg total) by mouth 2 (two) times a day for 5 days 10 tablet 0    Omega-3 Fatty Acids (FISH OIL) 645 MG CAPS Take 1 capsule by mouth daily      vitamin A 10,000 units capsule Take 1 tablet by mouth daily      VITAMIN B COMPLEX-C PO Take 1 capsule by mouth daily      Vitamin E 400 units TABS Take 1 tablet by mouth daily       No current facility-administered medications for this visit  Objective:    /60   Pulse 68   Temp 97 9 °F (36 6 °C)   Resp 16   Ht 5' 7 5" (1 715 m)   Wt 76 2 kg (168 lb)   SpO2 98%   BMI 25 92 kg/m²        Physical Exam   Constitutional: He appears well-developed  No distress  HENT:   Head: Normocephalic  Right Ear: External ear normal    Left Ear: External ear normal    Eyes: Conjunctivae are normal  No scleral icterus  Neck: Neck supple  Cardiovascular: Normal rate, regular rhythm and intact distal pulses  Pulmonary/Chest: Effort normal and breath sounds normal  No respiratory distress  Abdominal: Soft  Bowel sounds are normal  He exhibits no distension and no mass  There is no tenderness  There is no rebound and no guarding  Musculoskeletal: He exhibits no edema or deformity  Neurological: He is alert  He exhibits normal muscle tone  Skin: Skin is warm and dry  Capillary refill takes less than 2 seconds  No pallor  Psychiatric: His behavior is normal  Thought content normal    Nursing note and vitals reviewed               Ann Estevez DO

## 2020-03-06 LAB

## 2020-04-24 DIAGNOSIS — I10 BENIGN ESSENTIAL HYPERTENSION: ICD-10-CM

## 2020-04-24 RX ORDER — OLMESARTAN MEDOXOMIL 20 MG/1
TABLET ORAL
Qty: 90 TABLET | Refills: 0 | Status: SHIPPED | OUTPATIENT
Start: 2020-04-24 | End: 2020-07-30

## 2020-07-30 DIAGNOSIS — I10 BENIGN ESSENTIAL HYPERTENSION: ICD-10-CM

## 2020-07-30 RX ORDER — OLMESARTAN MEDOXOMIL 20 MG/1
TABLET ORAL
Qty: 90 TABLET | Refills: 0 | Status: SHIPPED | OUTPATIENT
Start: 2020-07-30 | End: 2020-10-30

## 2020-10-30 DIAGNOSIS — I10 BENIGN ESSENTIAL HYPERTENSION: ICD-10-CM

## 2020-10-30 RX ORDER — OLMESARTAN MEDOXOMIL 20 MG/1
TABLET ORAL
Qty: 90 TABLET | Refills: 1 | Status: SHIPPED | OUTPATIENT
Start: 2020-10-30 | End: 2021-01-26 | Stop reason: SDUPTHER

## 2020-11-10 ENCOUNTER — OFFICE VISIT (OUTPATIENT)
Dept: FAMILY MEDICINE CLINIC | Facility: CLINIC | Age: 45
End: 2020-11-10
Payer: COMMERCIAL

## 2020-11-10 VITALS
HEIGHT: 68 IN | RESPIRATION RATE: 18 BRPM | DIASTOLIC BLOOD PRESSURE: 70 MMHG | OXYGEN SATURATION: 94 % | SYSTOLIC BLOOD PRESSURE: 110 MMHG | WEIGHT: 177 LBS | TEMPERATURE: 97.2 F | HEART RATE: 78 BPM | BODY MASS INDEX: 26.83 KG/M2

## 2020-11-10 DIAGNOSIS — Z12.5 PROSTATE CANCER SCREENING: ICD-10-CM

## 2020-11-10 DIAGNOSIS — K64.4 EXTERNAL HEMORRHOIDS: Primary | ICD-10-CM

## 2020-11-10 DIAGNOSIS — Z13.89 SCREENING FOR CARDIOVASCULAR, RESPIRATORY, AND GENITOURINARY DISEASES: ICD-10-CM

## 2020-11-10 DIAGNOSIS — Z13.6 SCREENING FOR CARDIOVASCULAR, RESPIRATORY, AND GENITOURINARY DISEASES: ICD-10-CM

## 2020-11-10 DIAGNOSIS — R73.03 PREDIABETES: ICD-10-CM

## 2020-11-10 DIAGNOSIS — Z13.1 SCREENING FOR DIABETES MELLITUS (DM): ICD-10-CM

## 2020-11-10 DIAGNOSIS — Z13.83 SCREENING FOR CARDIOVASCULAR, RESPIRATORY, AND GENITOURINARY DISEASES: ICD-10-CM

## 2020-11-10 DIAGNOSIS — Z23 IMMUNIZATION DUE: ICD-10-CM

## 2020-11-10 PROCEDURE — 3725F SCREEN DEPRESSION PERFORMED: CPT | Performed by: FAMILY MEDICINE

## 2020-11-10 PROCEDURE — 3078F DIAST BP <80 MM HG: CPT | Performed by: FAMILY MEDICINE

## 2020-11-10 PROCEDURE — 3074F SYST BP LT 130 MM HG: CPT | Performed by: FAMILY MEDICINE

## 2020-11-10 PROCEDURE — 90471 IMMUNIZATION ADMIN: CPT

## 2020-11-10 PROCEDURE — 3008F BODY MASS INDEX DOCD: CPT | Performed by: FAMILY MEDICINE

## 2020-11-10 PROCEDURE — 90686 IIV4 VACC NO PRSV 0.5 ML IM: CPT

## 2020-11-10 PROCEDURE — 99214 OFFICE O/P EST MOD 30 MIN: CPT | Performed by: FAMILY MEDICINE

## 2020-11-10 RX ORDER — LORATADINE 10 MG/1
10 TABLET ORAL DAILY
COMMUNITY

## 2020-11-27 ENCOUNTER — TELEMEDICINE (OUTPATIENT)
Dept: FAMILY MEDICINE CLINIC | Facility: CLINIC | Age: 45
End: 2020-11-27
Payer: COMMERCIAL

## 2020-11-27 DIAGNOSIS — Z20.822 EXPOSURE TO COVID-19 VIRUS: ICD-10-CM

## 2020-11-27 DIAGNOSIS — Z20.822 EXPOSURE TO COVID-19 VIRUS: Primary | ICD-10-CM

## 2020-11-27 PROCEDURE — U0003 INFECTIOUS AGENT DETECTION BY NUCLEIC ACID (DNA OR RNA); SEVERE ACUTE RESPIRATORY SYNDROME CORONAVIRUS 2 (SARS-COV-2) (CORONAVIRUS DISEASE [COVID-19]), AMPLIFIED PROBE TECHNIQUE, MAKING USE OF HIGH THROUGHPUT TECHNOLOGIES AS DESCRIBED BY CMS-2020-01-R: HCPCS | Performed by: FAMILY MEDICINE

## 2020-11-27 PROCEDURE — 1036F TOBACCO NON-USER: CPT | Performed by: FAMILY MEDICINE

## 2020-11-27 PROCEDURE — 99214 OFFICE O/P EST MOD 30 MIN: CPT | Performed by: FAMILY MEDICINE

## 2020-11-28 LAB — SARS-COV-2 RNA SPEC QL NAA+PROBE: NOT DETECTED

## 2020-12-05 ENCOUNTER — TELEPHONE (OUTPATIENT)
Dept: FAMILY MEDICINE CLINIC | Facility: CLINIC | Age: 45
End: 2020-12-05

## 2020-12-30 LAB
ALBUMIN SERPL-MCNC: 4.6 G/DL (ref 4–5)
ALBUMIN/GLOB SERPL: 2.3 {RATIO} (ref 1.2–2.2)
ALP SERPL-CCNC: 74 IU/L (ref 39–117)
ALT SERPL-CCNC: 38 IU/L (ref 0–44)
AST SERPL-CCNC: 27 IU/L (ref 0–40)
BILIRUB SERPL-MCNC: 0.6 MG/DL (ref 0–1.2)
BUN SERPL-MCNC: 25 MG/DL (ref 6–24)
BUN/CREAT SERPL: 24 (ref 9–20)
CALCIUM SERPL-MCNC: 9.1 MG/DL (ref 8.7–10.2)
CHLORIDE SERPL-SCNC: 100 MMOL/L (ref 96–106)
CHOLEST SERPL-MCNC: 206 MG/DL (ref 100–199)
CO2 SERPL-SCNC: 24 MMOL/L (ref 20–29)
CREAT SERPL-MCNC: 1.05 MG/DL (ref 0.76–1.27)
GLOBULIN SER-MCNC: 2 G/DL (ref 1.5–4.5)
GLUCOSE SERPL-MCNC: 97 MG/DL (ref 65–99)
HBA1C MFR BLD: 5.6 % (ref 4.8–5.6)
HDLC SERPL-MCNC: 44 MG/DL
LDLC SERPL CALC-MCNC: 151 MG/DL (ref 0–99)
MICRODELETION SYND BLD/T FISH: NORMAL
POTASSIUM SERPL-SCNC: 4.2 MMOL/L (ref 3.5–5.2)
PROT SERPL-MCNC: 6.6 G/DL (ref 6–8.5)
PSA SERPL-MCNC: 0.7 NG/ML (ref 0–4)
SL AMB EGFR AFRICAN AMERICAN: 99 ML/MIN/1.73
SL AMB EGFR NON AFRICAN AMERICAN: 86 ML/MIN/1.73
SODIUM SERPL-SCNC: 138 MMOL/L (ref 134–144)
TRIGL SERPL-MCNC: 61 MG/DL (ref 0–149)

## 2021-01-26 DIAGNOSIS — I10 BENIGN ESSENTIAL HYPERTENSION: ICD-10-CM

## 2021-01-26 RX ORDER — OLMESARTAN MEDOXOMIL 20 MG/1
20 TABLET ORAL DAILY
Qty: 90 TABLET | Refills: 1 | Status: SHIPPED | OUTPATIENT
Start: 2021-01-26 | End: 2021-10-21

## 2021-05-10 ENCOUNTER — OFFICE VISIT (OUTPATIENT)
Dept: FAMILY MEDICINE CLINIC | Facility: CLINIC | Age: 46
End: 2021-05-10
Payer: COMMERCIAL

## 2021-05-10 VITALS
WEIGHT: 174 LBS | BODY MASS INDEX: 26.37 KG/M2 | HEIGHT: 68 IN | SYSTOLIC BLOOD PRESSURE: 130 MMHG | DIASTOLIC BLOOD PRESSURE: 80 MMHG | RESPIRATION RATE: 16 BRPM | HEART RATE: 50 BPM | TEMPERATURE: 97.6 F

## 2021-05-10 DIAGNOSIS — H66.92 LEFT OTITIS MEDIA, UNSPECIFIED OTITIS MEDIA TYPE: Primary | ICD-10-CM

## 2021-05-10 PROCEDURE — 3008F BODY MASS INDEX DOCD: CPT | Performed by: FAMILY MEDICINE

## 2021-05-10 PROCEDURE — 99213 OFFICE O/P EST LOW 20 MIN: CPT | Performed by: FAMILY MEDICINE

## 2021-05-10 PROCEDURE — 3725F SCREEN DEPRESSION PERFORMED: CPT | Performed by: FAMILY MEDICINE

## 2021-05-10 PROCEDURE — 1036F TOBACCO NON-USER: CPT | Performed by: FAMILY MEDICINE

## 2021-05-10 RX ORDER — TADALAFIL 5 MG/1
TABLET ORAL
COMMUNITY
Start: 2021-03-09

## 2021-05-10 RX ORDER — AMOXICILLIN 875 MG/1
875 TABLET, COATED ORAL 2 TIMES DAILY
Qty: 14 TABLET | Refills: 0 | Status: SHIPPED | OUTPATIENT
Start: 2021-05-10 | End: 2021-05-17

## 2021-05-10 NOTE — PROGRESS NOTES
Assessment/Plan:    1  Left otitis media, unspecified otitis media type  Comments:  warm compresses advised   Orders:  -     amoxicillin (AMOXIL) 875 mg tablet; Take 1 tablet (875 mg total) by mouth 2 (two) times a day for 7 days           There are no Patient Instructions on file for this visit  Return if symptoms worsen or fail to improve  Subjective:      Patient ID: Soo Owens is a 40 y o  male  Chief Complaint   Patient presents with    Neck Pain     left side, no known injury, per pt has had pain for a while  ac/cma        Left sided neck pain for the past 1-2 weeks  His left ear is sore as well  His hearing has not been affected  He had both of his COVID vaccines back in February  The following portions of the patient's history were reviewed and updated as appropriate:  past social history    Review of Systems      Current Outpatient Medications   Medication Sig Dispense Refill    cholecalciferol (VITAMIN D3) 1,000 units tablet Take 1 tablet by mouth daily      fluticasone (FLONASE) 50 mcg/act nasal spray 2 sprays into each nostril daily 3 Bottle 3    loratadine (Claritin) 10 mg tablet Take 10 mg by mouth daily      olmesartan (BENICAR) 20 mg tablet Take 1 tablet (20 mg total) by mouth daily 90 tablet 1    Omega-3 Fatty Acids (FISH OIL) 645 MG CAPS Take 1 capsule by mouth daily      tadalafil (CIALIS) 5 MG tablet TAKE 1 TABLET BY MOUTH ONCE DAILY AS NEEDED FOR ERECTILE DYSFUNCTION      vitamin A 10,000 units capsule Take 1 tablet by mouth daily      VITAMIN B COMPLEX-C PO Take 1 capsule by mouth daily      Vitamin E 400 units TABS Take 1 tablet by mouth daily      amoxicillin (AMOXIL) 875 mg tablet Take 1 tablet (875 mg total) by mouth 2 (two) times a day for 7 days 14 tablet 0     No current facility-administered medications for this visit          Objective:    /80   Pulse (!) 50   Temp 97 6 °F (36 4 °C)   Resp 16   Ht 5' 7 5" (1 715 m)   Wt 78 9 kg (174 lb) BMI 26 85 kg/m²      Physical Exam  Vitals signs and nursing note reviewed  Constitutional:       Appearance: He is well-developed  HENT:      Head: Normocephalic and atraumatic  Right Ear: Tympanic membrane and external ear normal       Left Ear: External ear normal       Ears:      Comments: Left TM effusion and erythema  Cardiovascular:      Rate and Rhythm: Normal rate and regular rhythm  Heart sounds: Normal heart sounds  No murmur  Pulmonary:      Effort: Pulmonary effort is normal  No respiratory distress  Breath sounds: Normal breath sounds  No wheezing or rales  Musculoskeletal:      Right lower leg: No edema  Left lower leg: No edema  Lymphadenopathy:      Cervical: Cervical adenopathy (left) present               Luis Magallon, DO

## 2021-08-14 PROBLEM — R19.7 DIARRHEA: Status: RESOLVED | Noted: 2020-03-02 | Resolved: 2021-08-14

## 2021-08-14 PROBLEM — N40.3 NODULAR PROSTATE WITH LOWER URINARY TRACT SYMPTOMS: Status: ACTIVE | Noted: 2020-12-22

## 2021-08-14 PROBLEM — M20.21 HALLUX RIGIDUS OF RIGHT FOOT: Status: RESOLVED | Noted: 2017-07-19 | Resolved: 2021-08-14

## 2021-08-16 ENCOUNTER — OFFICE VISIT (OUTPATIENT)
Dept: FAMILY MEDICINE CLINIC | Facility: CLINIC | Age: 46
End: 2021-08-16
Payer: COMMERCIAL

## 2021-08-16 VITALS
BODY MASS INDEX: 25.46 KG/M2 | WEIGHT: 168 LBS | SYSTOLIC BLOOD PRESSURE: 108 MMHG | DIASTOLIC BLOOD PRESSURE: 76 MMHG | TEMPERATURE: 97.8 F | RESPIRATION RATE: 16 BRPM | HEART RATE: 73 BPM | OXYGEN SATURATION: 97 % | HEIGHT: 68 IN

## 2021-08-16 DIAGNOSIS — R07.0 CHRONIC THROAT PAIN: Primary | ICD-10-CM

## 2021-08-16 DIAGNOSIS — I10 BENIGN ESSENTIAL HYPERTENSION: ICD-10-CM

## 2021-08-16 DIAGNOSIS — G89.29 CHRONIC THROAT PAIN: Primary | ICD-10-CM

## 2021-08-16 PROCEDURE — 1036F TOBACCO NON-USER: CPT | Performed by: FAMILY MEDICINE

## 2021-08-16 PROCEDURE — 3008F BODY MASS INDEX DOCD: CPT | Performed by: FAMILY MEDICINE

## 2021-08-16 PROCEDURE — 99214 OFFICE O/P EST MOD 30 MIN: CPT | Performed by: FAMILY MEDICINE

## 2021-08-16 NOTE — PROGRESS NOTES
Assessment/Plan:    No problem-specific Assessment & Plan notes found for this encounter     htn stable  Throat/ear pain with left TM scarring, r/o ETD or throat lesion, check CT, consider ENT re-eval/scope     Diagnoses and all orders for this visit:    Chronic throat pain  -     Ambulatory Referral to Otolaryngology; Future  -     CT soft tissue neck w contrast; Future    Benign essential hypertension  -     Comprehensive metabolic panel; Future        Return if symptoms worsen or fail to improve  Subjective:      Patient ID: Don Fair is a 40 y o  male  Chief Complaint   Patient presents with    Sore Throat     Lump on neck mz cma       HPI  Planning vasectomy with Dr Kory Houser    Left side of neck  Feels it with turning head  Clicking with swallow some times  Feels raw there  Left ear infection recurrently  Treated in past with abx but never goes away  Not visible on outside  Been ENT in past, many years ago, CT and MRI in past all normal  About 5y ago    No rhinoscopy done by his recollection    The following portions of the patient's history were reviewed and updated as appropriate: allergies, current medications, past family history, past medical history, past social history, past surgical history and problem list     Review of Systems   Constitutional: Negative for fever and unexpected weight change           Current Outpatient Medications   Medication Sig Dispense Refill    cholecalciferol (VITAMIN D3) 1,000 units tablet Take 1 tablet by mouth daily      fluticasone (FLONASE) 50 mcg/act nasal spray 2 sprays into each nostril daily 3 Bottle 3    loratadine (Claritin) 10 mg tablet Take 10 mg by mouth daily      olmesartan (BENICAR) 20 mg tablet Take 1 tablet (20 mg total) by mouth daily 90 tablet 1    Omega-3 Fatty Acids (FISH OIL) 645 MG CAPS Take 1 capsule by mouth daily      tadalafil (CIALIS) 5 MG tablet TAKE 1 TABLET BY MOUTH ONCE DAILY AS NEEDED FOR ERECTILE DYSFUNCTION      vitamin A 10,000 units capsule Take 1 tablet by mouth daily      VITAMIN B COMPLEX-C PO Take 1 capsule by mouth daily      Vitamin E 400 units TABS Take 1 tablet by mouth daily       No current facility-administered medications for this visit  Objective:    /76   Pulse 73   Temp 97 8 °F (36 6 °C)   Resp 16   Ht 5' 7 5" (1 715 m)   Wt 76 2 kg (168 lb)   SpO2 97%   BMI 25 92 kg/m²        Physical Exam  Vitals and nursing note reviewed  Constitutional:       General: He is not in acute distress  Appearance: He is well-developed  He is not ill-appearing  HENT:      Head: Normocephalic  Right Ear: Tympanic membrane and external ear normal  There is no impacted cerumen  Left Ear: External ear normal  There is no impacted cerumen  Ears:      Comments: Left tm scarring     Mouth/Throat:      Mouth: Mucous membranes are moist  No oral lesions  Pharynx: No oropharyngeal exudate  Eyes:      Conjunctiva/sclera: Conjunctivae normal    Neck:      Vascular: No carotid bruit  Cardiovascular:      Rate and Rhythm: Normal rate  Pulmonary:      Effort: Pulmonary effort is normal  No respiratory distress  Breath sounds: No wheezing  Abdominal:      Palpations: Abdomen is soft  Musculoskeletal:         General: No deformity  Cervical back: Neck supple  No tenderness  Lymphadenopathy:      Cervical: No cervical adenopathy  Skin:     General: Skin is warm and dry  Coloration: Skin is not pale  Neurological:      Mental Status: He is alert  Psychiatric:         Behavior: Behavior normal          Thought Content:  Thought content normal                 Yudith Mohan DO

## 2021-08-17 LAB
ALBUMIN SERPL-MCNC: 4.5 G/DL (ref 4–5)
ALBUMIN/GLOB SERPL: 2.4 {RATIO} (ref 1.2–2.2)
ALP SERPL-CCNC: 71 IU/L (ref 48–121)
ALT SERPL-CCNC: 20 IU/L (ref 0–44)
AST SERPL-CCNC: 25 IU/L (ref 0–40)
BILIRUB SERPL-MCNC: 0.7 MG/DL (ref 0–1.2)
BUN SERPL-MCNC: 22 MG/DL (ref 6–24)
BUN/CREAT SERPL: 21 (ref 9–20)
CALCIUM SERPL-MCNC: 9.3 MG/DL (ref 8.7–10.2)
CHLORIDE SERPL-SCNC: 102 MMOL/L (ref 96–106)
CO2 SERPL-SCNC: 27 MMOL/L (ref 20–29)
CREAT SERPL-MCNC: 1.06 MG/DL (ref 0.76–1.27)
GLOBULIN SER-MCNC: 1.9 G/DL (ref 1.5–4.5)
GLUCOSE SERPL-MCNC: 94 MG/DL (ref 65–99)
POTASSIUM SERPL-SCNC: 4.7 MMOL/L (ref 3.5–5.2)
PROT SERPL-MCNC: 6.4 G/DL (ref 6–8.5)
SL AMB EGFR AFRICAN AMERICAN: 97 ML/MIN/1.73
SL AMB EGFR NON AFRICAN AMERICAN: 84 ML/MIN/1.73
SODIUM SERPL-SCNC: 140 MMOL/L (ref 134–144)

## 2021-10-21 DIAGNOSIS — I10 BENIGN ESSENTIAL HYPERTENSION: ICD-10-CM

## 2021-10-21 RX ORDER — OLMESARTAN MEDOXOMIL 20 MG/1
TABLET ORAL
Qty: 90 TABLET | Refills: 0 | Status: SHIPPED | OUTPATIENT
Start: 2021-10-21 | End: 2022-01-18

## 2022-01-18 DIAGNOSIS — I10 BENIGN ESSENTIAL HYPERTENSION: ICD-10-CM

## 2022-01-18 RX ORDER — OLMESARTAN MEDOXOMIL 20 MG/1
TABLET ORAL
Qty: 90 TABLET | Refills: 0 | Status: SHIPPED | OUTPATIENT
Start: 2022-01-18 | End: 2022-02-05

## 2022-02-05 DIAGNOSIS — I10 BENIGN ESSENTIAL HYPERTENSION: ICD-10-CM

## 2022-02-05 RX ORDER — OLMESARTAN MEDOXOMIL 20 MG/1
TABLET ORAL
Qty: 90 TABLET | Refills: 0 | Status: SHIPPED | OUTPATIENT
Start: 2022-02-05 | End: 2022-07-20

## 2022-02-10 ENCOUNTER — OFFICE VISIT (OUTPATIENT)
Dept: FAMILY MEDICINE CLINIC | Facility: CLINIC | Age: 47
End: 2022-02-10
Payer: COMMERCIAL

## 2022-02-10 VITALS
TEMPERATURE: 97.4 F | OXYGEN SATURATION: 98 % | HEIGHT: 68 IN | RESPIRATION RATE: 18 BRPM | BODY MASS INDEX: 26.83 KG/M2 | DIASTOLIC BLOOD PRESSURE: 68 MMHG | WEIGHT: 177 LBS | SYSTOLIC BLOOD PRESSURE: 110 MMHG | HEART RATE: 58 BPM

## 2022-02-10 DIAGNOSIS — M25.511 ACUTE PAIN OF RIGHT SHOULDER: Primary | ICD-10-CM

## 2022-02-10 PROCEDURE — 3725F SCREEN DEPRESSION PERFORMED: CPT | Performed by: FAMILY MEDICINE

## 2022-02-10 PROCEDURE — 99213 OFFICE O/P EST LOW 20 MIN: CPT | Performed by: FAMILY MEDICINE

## 2022-02-10 RX ORDER — CYCLOBENZAPRINE HCL 10 MG
10 TABLET ORAL
Qty: 10 TABLET | Refills: 0 | Status: SHIPPED | OUTPATIENT
Start: 2022-02-10 | End: 2022-02-24

## 2022-02-10 RX ORDER — NAPROXEN 500 MG/1
500 TABLET ORAL 2 TIMES DAILY WITH MEALS
Qty: 10 TABLET | Refills: 0 | Status: SHIPPED | OUTPATIENT
Start: 2022-02-10

## 2022-02-10 NOTE — PROGRESS NOTES
Assessment/Plan:       Diagnoses and all orders for this visit:    Acute pain of right shoulder  Acute neck pain  -     Likely muscle spasm  Counseled on supportive care including rest, heat to the area, gentle stretches  He does have follow up scheduled with orthopedic    - Discussed PT, but he will work on above for now and do PT if symptoms persist    - cyclobenzaprine (FLEXERIL) 10 mg tablet; Take 1 tablet (10 mg total) by mouth daily at bedtime for 10 days  -     naproxen (Naprosyn) 500 mg tablet; Take 1 tablet (500 mg total) by mouth 2 (two) times a day with meals        Subjective:      Patient ID: García Record is a 56 y o  male  Shoulder Pain   Pain location: right neck to right shoulder  This is a new problem  Episode onset: 2 weeks  There has been no history of extremity trauma  The problem has been gradually worsening  The quality of the pain is described as dull and aching  The pain is at a severity of 5/10  Pertinent negatives include no fever, inability to bear weight, itching, joint locking, joint swelling, limited range of motion, numbness, stiffness or tingling  He has tried nothing for the symptoms  Family history does not include gout or rheumatoid arthritis  There is no history of diabetes, gout, osteoarthritis or rheumatoid arthritis  The following portions of the patient's history were reviewed and updated as appropriate: allergies, current medications, past family history, past medical history, past social history, past surgical history and problem list     Review of Systems   Constitutional: Negative  Negative for fever  HENT: Negative  Eyes: Negative  Respiratory: Negative  Cardiovascular: Negative  Gastrointestinal: Negative  Endocrine: Negative  Genitourinary: Negative  Musculoskeletal: Negative  Negative for gout and stiffness  As noted in HPI   Skin: Negative for itching  Neurological: Negative  Negative for tingling and numbness  Hematological: Negative  Psychiatric/Behavioral: Negative  Objective:      /68   Pulse 58   Temp (!) 97 4 °F (36 3 °C)   Resp 18   Ht 5' 7 5" (1 715 m)   Wt 80 3 kg (177 lb)   SpO2 98%   BMI 27 31 kg/m²          Physical Exam  Constitutional:       General: He is not in acute distress  Appearance: He is well-developed  He is not diaphoretic  HENT:      Head: Normocephalic and atraumatic  Eyes:      General: No scleral icterus  Right eye: No discharge  Left eye: No discharge  Conjunctiva/sclera: Conjunctivae normal    Pulmonary:      Effort: Pulmonary effort is normal    Musculoskeletal:      Right shoulder: No swelling, deformity, effusion, laceration, tenderness, bony tenderness or crepitus  Normal range of motion  Normal strength  Normal pulse  Left shoulder: Normal       Cervical back: Normal range of motion  No swelling, edema, deformity, erythema, signs of trauma, lacerations, rigidity, spasms, torticollis, tenderness, bony tenderness or crepitus  Pain with movement present  Normal range of motion  Skin:     General: Skin is warm  Neurological:      Mental Status: He is alert and oriented to person, place, and time  Psychiatric:         Behavior: Behavior normal          Thought Content:  Thought content normal          Judgment: Judgment normal

## 2022-02-18 ENCOUNTER — OFFICE VISIT (OUTPATIENT)
Dept: OBGYN CLINIC | Facility: CLINIC | Age: 47
End: 2022-02-18
Payer: COMMERCIAL

## 2022-02-18 VITALS
DIASTOLIC BLOOD PRESSURE: 96 MMHG | TEMPERATURE: 97 F | HEIGHT: 68 IN | HEART RATE: 65 BPM | SYSTOLIC BLOOD PRESSURE: 137 MMHG | WEIGHT: 177 LBS | BODY MASS INDEX: 26.83 KG/M2

## 2022-02-18 DIAGNOSIS — S29.012A RHOMBOID MUSCLE STRAIN, INITIAL ENCOUNTER: Primary | ICD-10-CM

## 2022-02-18 PROCEDURE — 99203 OFFICE O/P NEW LOW 30 MIN: CPT | Performed by: ORTHOPAEDIC SURGERY

## 2022-02-18 NOTE — PROGRESS NOTES
Assessment/Plan:  1  Rhomboid muscle strain, initial encounter  XR shoulder 2+ vw right    Ambulatory referral to Physical Therapy     Deacon Murray has right-sided shoulder and back discomfort consistent with a rhomboid muscle strain  I recommended exercises to strengthen his scapular retractors as well as formal physical therapy  He can continue with rest, heat, anti-inflammatories and muscle relaxers as needed  If the pain persists or worsens we could consider further imaging of his neck or shoulder region if clinically indicated  He will follow up with me as needed  Subjective:   Jm Draper is a 56 y o  male who presents to the office for evaluation for right-sided shoulder pain  He has been feeling discomfort behind his shoulder blade for the last 1 month  He feels like the pain may have began after he was exercising and doing a pull-up  He went to his primary care physician's office and was given oral anti-inflammatory medications and muscle relaxers 1 week ago  He states the muscle relaxers help a little bit at night but he does not like to wait but they make him feel  Anti-inflammatory medications have only helps slightly  He describes an aching throbbing intermittent discomfort just to the inside of his right shoulder blade  This worsens with overhead movement  He works as a  and wearing his equipment over his shoulder does pull on this area and cause increased pain  He denies any numbness or tingling or radiating pain down his arm  He denies any pain to his neck  Review of Systems   Constitutional: Negative for chills, fever and unexpected weight change  HENT: Negative for hearing loss, nosebleeds and sore throat  Eyes: Negative for pain, redness and visual disturbance  Respiratory: Negative for cough, shortness of breath and wheezing  Cardiovascular: Negative for chest pain, palpitations and leg swelling     Gastrointestinal: Negative for abdominal pain, nausea and vomiting  Endocrine: Negative for polyphagia and polyuria  Genitourinary: Negative for dysuria and hematuria  Musculoskeletal:        See HPI   Skin: Negative for rash and wound  Neurological: Negative for dizziness, numbness and headaches  Psychiatric/Behavioral: Negative for decreased concentration and suicidal ideas  The patient is not nervous/anxious            Past Medical History:   Diagnosis Date    Corneal abrasion     Resolved 8/23/2016     Difficult or painful urination     Resolved 8/23/2016     Hypertension     IFG (impaired fasting glucose)     Resolved 9/18/2017     Prediabetes     Resolved 9/18/2017     Vertigo        Past Surgical History:   Procedure Laterality Date    APPENDECTOMY      HERNIA REPAIR      KNEE SURGERY Right 2009    Lateral release     [place] LAP,APPENDECTOMY N/A 7/30/2016    Procedure: APPENDECTOMY LAPAROSCOPIC;  Surgeon: Elizabeth Elizalde MD;  [facility];  Service: General    SHOULDER SURGERY Right 2006    SLAP       Family History   Problem Relation Age of Onset    Coronary artery disease Father     Prostate cancer Father     Hypertension Family     Lymphoma Family     Skin cancer Family        Social History     Occupational History    Not on file   Tobacco Use    Smoking status: Never Smoker    Smokeless tobacco: Never Used   Vaping Use    Vaping Use: Never used   Substance and Sexual Activity    Alcohol use: Yes     Comment: rare    Drug use: No    Sexual activity: Not on file         Current Outpatient Medications:     cholecalciferol (VITAMIN D3) 1,000 units tablet, Take 1 tablet by mouth daily, Disp: , Rfl:     fluticasone (FLONASE) 50 mcg/act nasal spray, 2 sprays into each nostril daily, Disp: 3 Bottle, Rfl: 3    loratadine (Claritin) 10 mg tablet, Take 10 mg by mouth daily, Disp: , Rfl:     olmesartan (BENICAR) 20 mg tablet, Take 1 tablet by mouth once daily, Disp: 90 tablet, Rfl: 0    Omega-3 Fatty Acids (FISH OIL) 645 MG CAPS, Take 1 capsule by mouth daily, Disp: , Rfl:     tadalafil (CIALIS) 5 MG tablet, TAKE 1 TABLET BY MOUTH ONCE DAILY AS NEEDED FOR ERECTILE DYSFUNCTION, Disp: , Rfl:     cyclobenzaprine (FLEXERIL) 10 mg tablet, Take 1 tablet (10 mg total) by mouth daily at bedtime for 10 days (Patient not taking: Reported on 2/18/2022 ), Disp: 10 tablet, Rfl: 0    naproxen (Naprosyn) 500 mg tablet, Take 1 tablet (500 mg total) by mouth 2 (two) times a day with meals (Patient not taking: Reported on 2/18/2022 ), Disp: 10 tablet, Rfl: 0    Allergies   Allergen Reactions    Bee Venom     Flomax [Tamsulosin Hcl] Other (See Comments)     hypotension    Lisinopril     Pollen Extract     Tamsulosin Other (See Comments)     hypotension  hypotension       Objective:  Vitals:    02/18/22 1035   BP: 137/96   Pulse: 65   Temp: (!) 97 °F (36 1 °C)       Ortho Exam    Physical Exam  Vitals and nursing note reviewed  Constitutional:       Appearance: He is well-developed  HENT:      Head: Normocephalic and atraumatic  Eyes:      General: No scleral icterus  Conjunctiva/sclera: Conjunctivae normal    Neck:      Comments: Negative Spurling's maneuver bilaterally  Full strength and sensation bilateral extremities  Cardiovascular:      Rate and Rhythm: Normal rate  Pulmonary:      Effort: Pulmonary effort is normal  No respiratory distress  Musculoskeletal:      Cervical back: Neck supple  Spasms and tenderness present  Muscular tenderness present  No spinous process tenderness  Decreased range of motion  Back:       Comments: Tenderness to palpation over medial aspect of left scapula and levator scapula muscle   Skin:     General: Skin is warm and dry  Neurological:      Mental Status: He is alert and oriented to person, place, and time     Psychiatric:         Behavior: Behavior normal

## 2022-02-24 ENCOUNTER — APPOINTMENT (EMERGENCY)
Dept: RADIOLOGY | Facility: HOSPITAL | Age: 47
End: 2022-02-24
Payer: COMMERCIAL

## 2022-02-24 ENCOUNTER — HOSPITAL ENCOUNTER (EMERGENCY)
Facility: HOSPITAL | Age: 47
Discharge: HOME | End: 2022-02-24
Attending: EMERGENCY MEDICINE
Payer: COMMERCIAL

## 2022-02-24 VITALS
SYSTOLIC BLOOD PRESSURE: 139 MMHG | RESPIRATION RATE: 18 BRPM | HEART RATE: 61 BPM | WEIGHT: 175.6 LBS | HEIGHT: 68 IN | TEMPERATURE: 96.3 F | OXYGEN SATURATION: 98 % | DIASTOLIC BLOOD PRESSURE: 78 MMHG | BODY MASS INDEX: 26.61 KG/M2

## 2022-02-24 DIAGNOSIS — M50.20 CERVICAL DISC HERNIATION: ICD-10-CM

## 2022-02-24 DIAGNOSIS — M54.2 NECK PAIN: Primary | ICD-10-CM

## 2022-02-24 DIAGNOSIS — M54.10 RADICULOPATHY: ICD-10-CM

## 2022-02-24 PROCEDURE — 99284 EMERGENCY DEPT VISIT MOD MDM: CPT | Performed by: EMERGENCY MEDICINE

## 2022-02-24 PROCEDURE — 72125 CT NECK SPINE W/O DYE: CPT

## 2022-02-24 PROCEDURE — 99284 EMERGENCY DEPT VISIT MOD MDM: CPT

## 2022-02-24 PROCEDURE — 96374 THER/PROPH/DIAG INJ IV PUSH: CPT

## 2022-02-24 PROCEDURE — G1004 CDSM NDSC: HCPCS

## 2022-02-24 RX ORDER — HYDROCODONE BITARTRATE AND ACETAMINOPHEN 5; 325 MG/1; MG/1
1 TABLET ORAL ONCE
Status: DISCONTINUED | OUTPATIENT
Start: 2022-02-24 | End: 2022-02-24

## 2022-02-24 RX ORDER — KETOROLAC TROMETHAMINE 30 MG/ML
30 INJECTION, SOLUTION INTRAMUSCULAR; INTRAVENOUS ONCE
Status: DISCONTINUED | OUTPATIENT
Start: 2022-02-24 | End: 2022-02-24

## 2022-02-24 RX ORDER — MELOXICAM 15 MG/1
15 TABLET ORAL DAILY
Qty: 7 TABLET | Refills: 0 | Status: SHIPPED | OUTPATIENT
Start: 2022-02-24 | End: 2022-03-03

## 2022-02-24 RX ORDER — LIDOCAINE 50 MG/G
1 PATCH TOPICAL DAILY
Qty: 2 PATCH | Refills: 0 | Status: SHIPPED | OUTPATIENT
Start: 2022-02-24 | End: 2022-02-26

## 2022-02-24 RX ORDER — KETOROLAC TROMETHAMINE 30 MG/ML
15 INJECTION, SOLUTION INTRAMUSCULAR; INTRAVENOUS ONCE
Status: COMPLETED | OUTPATIENT
Start: 2022-02-24 | End: 2022-02-24

## 2022-02-24 RX ORDER — CYCLOBENZAPRINE HCL 10 MG
10 TABLET ORAL ONCE
Status: DISCONTINUED | OUTPATIENT
Start: 2022-02-24 | End: 2022-02-24

## 2022-02-24 RX ORDER — PREDNISONE 20 MG/1
60 TABLET ORAL DAILY
Qty: 15 TABLET | Refills: 0 | Status: SHIPPED | OUTPATIENT
Start: 2022-02-24 | End: 2022-03-01

## 2022-02-24 RX ADMIN — PREDNISONE 50 MG: 20 TABLET ORAL at 12:19

## 2022-02-24 RX ADMIN — KETOROLAC TROMETHAMINE 15 MG: 30 INJECTION, SOLUTION INTRAMUSCULAR at 12:24

## 2022-02-24 NOTE — ED PROVIDER NOTES
History  Chief Complaint   Patient presents with    Neck Pain     c/o neck pain going to right shoulder through right hand w/ numbenss and tingling for a month  Pt reports seen by Orthopedic last week and was advised to exercise and has PT on Monday  Pt reporst pain today unbearble  52-year-old male presents with the right-sided neck pain radiating down his right shoulder and some arm for the past couple weeks  Seen by Orthopedics diagnosed as a muscle strain  Today the pain got so worse that he came to the ED for evaluation  Denies any numbness tingling weakness no new trauma noted  No chest pain shortness of breath the diaphoresis or any other symptoms  History provided by:  Patient   used: No        Prior to Admission Medications   Prescriptions Last Dose Informant Patient Reported? Taking?    Omega-3 Fatty Acids (FISH OIL) 645 MG CAPS 2022 at Unknown time  Yes Yes   Sig: Take 1 capsule by mouth daily   cholecalciferol (VITAMIN D3) 1,000 units tablet 2022 at Unknown time  Yes Yes   Sig: Take 1 tablet by mouth daily   cyclobenzaprine (FLEXERIL) 10 mg tablet 2022 at Unknown time  No Yes   Sig: Take 1 tablet (10 mg total) by mouth daily at bedtime for 10 days   fluticasone (FLONASE) 50 mcg/act nasal spray Not Taking at Unknown time  No No   Si sprays into each nostril daily   Patient not taking: Reported on 2022    loratadine (Claritin) 10 mg tablet 2022 at Unknown time Self Yes Yes   Sig: Take 10 mg by mouth daily   naproxen (Naprosyn) 500 mg tablet   No No   Sig: Take 1 tablet (500 mg total) by mouth 2 (two) times a day with meals   Patient not taking: Reported on 2022    olmesartan (BENICAR) 20 mg tablet 2022 at Unknown time  No Yes   Sig: Take 1 tablet by mouth once daily   tadalafil (CIALIS) 5 MG tablet 2022 at Unknown time  Yes Yes   Sig: TAKE 1 TABLET BY MOUTH ONCE DAILY AS NEEDED FOR ERECTILE DYSFUNCTION      Facility-Administered Medications: None       Past Medical History:   Diagnosis Date    Corneal abrasion     Resolved 8/23/2016     Difficult or painful urination     Resolved 8/23/2016     Hypertension     IFG (impaired fasting glucose)     Resolved 9/18/2017     Prediabetes     Resolved 9/18/2017     Vertigo        Past Surgical History:   Procedure Laterality Date    APPENDECTOMY      HERNIA REPAIR      KNEE SURGERY Right 2009    Lateral release     [place] LAP,APPENDECTOMY N/A 7/30/2016    Procedure: APPENDECTOMY LAPAROSCOPIC;  Surgeon: Cedric Blancas MD;  [facility];  Service: General    SHOULDER SURGERY Right 2006    SLAP       Family History   Problem Relation Age of Onset    Coronary artery disease Father     Prostate cancer Father     Hypertension Family     Lymphoma Family     Skin cancer Family      I have reviewed and agree with the history as documented  E-Cigarette/Vaping    E-Cigarette Use Never User      E-Cigarette/Vaping Substances    Nicotine No     THC No     CBD No     Flavoring No     Other No     Unknown No      Social History     Tobacco Use    Smoking status: Never Smoker    Smokeless tobacco: Never Used   Vaping Use    Vaping Use: Never used   Substance Use Topics    Alcohol use: Yes     Comment: rare    Drug use: No       Review of Systems   Constitutional: Negative  HENT: Negative  Eyes: Negative  Respiratory: Negative  Cardiovascular: Negative  Gastrointestinal: Negative  Endocrine: Negative  Genitourinary: Negative  Musculoskeletal: Positive for myalgias and neck pain  Skin: Negative  Allergic/Immunologic: Negative  Neurological: Positive for numbness  Hematological: Negative  Psychiatric/Behavioral: Negative  All other systems reviewed and are negative  Physical Exam  Physical Exam  Constitutional:       Appearance: Normal appearance  HENT:      Head: Normocephalic and atraumatic        Nose: Nose normal       Mouth/Throat: Mouth: Mucous membranes are moist    Eyes:      Extraocular Movements: Extraocular movements intact  Pupils: Pupils are equal, round, and reactive to light  Cardiovascular:      Rate and Rhythm: Normal rate and regular rhythm  Pulmonary:      Effort: Pulmonary effort is normal       Breath sounds: Normal breath sounds  Abdominal:      General: Abdomen is flat  Bowel sounds are normal       Palpations: Abdomen is soft  Musculoskeletal:         General: Normal range of motion  Cervical back: Normal range of motion and neck supple  Comments: Paravertebral cervical spine tenderness noted left greater than the right  with the range of motion is a bit restricted at the right shoulder joint  Sensation intact radial pulses are intact  Axillary nerve is intact  Skin:     General: Skin is warm  Capillary Refill: Capillary refill takes less than 2 seconds  Neurological:      General: No focal deficit present  Mental Status: He is alert and oriented to person, place, and time  Mental status is at baseline  Psychiatric:         Mood and Affect: Mood normal          Thought Content:  Thought content normal          Vital Signs  ED Triage Vitals   Temperature Pulse Respirations Blood Pressure SpO2   02/24/22 1115 02/24/22 1115 02/24/22 1115 02/24/22 1115 02/24/22 1115   (!) 96 3 °F (35 7 °C) 67 18 137/89 97 %      Temp Source Heart Rate Source Patient Position - Orthostatic VS BP Location FiO2 (%)   02/24/22 1115 02/24/22 1410 02/24/22 1410 02/24/22 1410 --   Temporal Monitor Sitting Right arm       Pain Score       02/24/22 1115       8           Vitals:    02/24/22 1115 02/24/22 1410   BP: 137/89 139/78   Pulse: 67 61   Patient Position - Orthostatic VS:  Sitting         Visual Acuity      ED Medications  Medications   predniSONE tablet 50 mg (50 mg Oral Given 2/24/22 1219)   ketorolac (TORADOL) injection 15 mg (15 mg Intravenous Given 2/24/22 1224)       Diagnostic Studies  Results Reviewed     None                 CT spine cervical without contrast   Final Result by Brenda Napier MD (02/24 1319)      Degenerative changes at C6-C7 with a small posterior disc bulge and moderate left neural foraminal narrowing  No areas of right neural foraminal narrowing to account for this patient's right-sided symptoms  Workstation performed: [id number]                    Procedures  Procedures         ED Course                               SBIRT 20yo+      Most Recent Value   SBIRT (22 yo +)    In order to provide better care to our patients, we are screening all of our patients for alcohol and drug use  Would it be okay to ask you these screening questions? No Filed at: 02/24/2022 1313                    MDM  Number of Diagnoses or Management Options     Amount and/or Complexity of Data Reviewed  Tests in the radiology section of CPT®: reviewed and ordered  Tests in the medicine section of CPT®: ordered and reviewed    Patient Progress  Patient progress: stable      Disposition  Final diagnoses:   Neck pain   Radiculopathy   Cervical disc herniation     Time reflects when diagnosis was documented in both MDM as applicable and the Disposition within this note     Time User Action Codes Description Comment    2/24/2022  1:41 PM Anepu, Trinh Add [M54 2] Neck pain     2/24/2022  1:41 PM Anepu, Trinh Add [M54 10] Radiculopathy     2/24/2022  1:42 PM Anepu, Trinh Add [M50 20] Cervical disc herniation       ED Disposition     ED Disposition Condition Date/Time Comment    Discharge Stable u Feb 24, 2022  1:41 PM Alcus Prow discharge to home/self care              Follow-up Information     Follow up With Specialties Details Why Contact Info Additional Information    Federico Mcclure,  Family Medicine Schedule an appointment as soon as possible for a visit   [address] [place] [address] [address] [facility] Emergency Department Emergency Medicine  If symptoms worsen [address] [address]  [address] [address] Emergency Department, [place], [place], [address], [place] Orthopedic Surgery   [address] [facility] [address]  [phone]             Discharge Medication List as of 2/24/2022  1:49 PM      START taking these medications    Details   lidocaine (LIDODERM) 5 % Apply 1 patch topically daily for 2 days Remove & Discard patch within 12 hours or as directed by MD, Starting Thu 2/24/2022, Until Sat 2/26/2022, Normal      meloxicam (Mobic) 15 mg tablet Take 1 tablet (15 mg total) by mouth daily for 7 days, Starting u 2/24/2022, Until u 3/3/2022, Normal      predniSONE 20 mg tablet Take 3 tablets (60 mg total) by mouth daily for 5 days, Starting Thu 2/24/2022, Until Tue 3/1/2022, Normal         CONTINUE these medications which have NOT CHANGED    Details   cholecalciferol (VITAMIN D3) 1,000 units tablet Take 1 tablet by mouth daily, Historical Med      cyclobenzaprine (FLEXERIL) 10 mg tablet Take 1 tablet (10 mg total) by mouth daily at bedtime for 10 days, Starting u 2/10/2022, Until Thu 2/24/2022, Normal      fluticasone (FLONASE) 50 mcg/act nasal spray 2 sprays into each nostril daily, Starting Wed 7/31/2019, Normal      loratadine (Claritin) 10 mg tablet Take 10 mg by mouth daily, Historical Med      naproxen (Naprosyn) 500 mg tablet Take 1 tablet (500 mg total) by mouth 2 (two) times a day with meals, Starting Thu 2/10/2022, Normal      olmesartan (BENICAR) 20 mg tablet Take 1 tablet by mouth once daily, Normal      Omega-3 Fatty Acids (FISH OIL) 645 MG CAPS Take 1 capsule by mouth daily, Historical Med      tadalafil (CIALIS) 5 MG tablet TAKE 1 TABLET BY MOUTH ONCE DAILY AS NEEDED FOR ERECTILE DYSFUNCTION, Historical Med             No discharge procedures on file      PDMP Review     None          ED Provider  Electronically Signed by           Joseph Dear,   02/27/22 2024

## 2022-02-24 NOTE — DISCHARGE INSTRUCTIONS
-stop taking all antiinflammatories and continue mobic  -take protonix as prednisone and mobic can make your stomach hurt  -you might need MRI of the cervical spine

## 2022-02-24 NOTE — Clinical Note
Bernardine Gottron was seen and treated in our emergency department on 2/24/2022  Diagnosis:      Evelyne Thomson  may return to work on return date  He may return on this date: 02/28/2022    Light duty for 2 weeks     If you have any questions or concerns, please don't hesitate to call        Trinh Oshea, DO    ______________________________           _______________          _______________  Hospital Representative                              Date                                Time

## 2022-02-25 ENCOUNTER — TELEPHONE (OUTPATIENT)
Dept: OBGYN CLINIC | Facility: HOSPITAL | Age: 47
End: 2022-02-25

## 2022-02-25 ENCOUNTER — OFFICE VISIT (OUTPATIENT)
Dept: OBGYN CLINIC | Facility: CLINIC | Age: 47
End: 2022-02-25
Payer: COMMERCIAL

## 2022-02-25 VITALS
WEIGHT: 175 LBS | SYSTOLIC BLOOD PRESSURE: 158 MMHG | HEIGHT: 68 IN | HEART RATE: 96 BPM | DIASTOLIC BLOOD PRESSURE: 101 MMHG | TEMPERATURE: 97 F | BODY MASS INDEX: 26.52 KG/M2

## 2022-02-25 DIAGNOSIS — M54.12 RIGHT CERVICAL RADICULOPATHY: Primary | ICD-10-CM

## 2022-02-25 DIAGNOSIS — S29.012A RHOMBOID MUSCLE STRAIN, INITIAL ENCOUNTER: ICD-10-CM

## 2022-02-25 PROCEDURE — 3008F BODY MASS INDEX DOCD: CPT | Performed by: ORTHOPAEDIC SURGERY

## 2022-02-25 PROCEDURE — 1036F TOBACCO NON-USER: CPT | Performed by: ORTHOPAEDIC SURGERY

## 2022-02-25 PROCEDURE — 99213 OFFICE O/P EST LOW 20 MIN: CPT | Performed by: ORTHOPAEDIC SURGERY

## 2022-02-25 RX ORDER — TRAMADOL HYDROCHLORIDE 50 MG/1
50 TABLET ORAL EVERY 8 HOURS PRN
Qty: 15 TABLET | Refills: 0 | Status: SHIPPED | OUTPATIENT
Start: 2022-02-25 | End: 2022-03-02

## 2022-02-25 NOTE — PROGRESS NOTES
Assessment/Plan:  1  Right cervical radiculopathy  traMADol (Ultram) 50 mg tablet   2  Rhomboid muscle strain, initial encounter       Dillon Nunez has severe discomfort in his neck and right shoulder that is consistent with cervical radiculopathy  I do not think this is coming from his shoulder he may also be experiencing severe spasm in his rhomboid musculature  Does have off from work for the weekend and I recommended continuing with the oral steroids, heat and beginning physical therapy as soon as possible  Did give him a short course of pain medication to help get through the weekend if the pain remains as severe  If the pain persists or worsens he may require an MRI of the cervical spine and evaluation with [facility]  Subjective:   Cristina Velazquez is a 56 y o  male who presents to the office for follow-up for right-sided neck and shoulder pain  He was seen in the office 1 week ago with discomfort around his rhomboid musculature consistent with a rhomboid strain  He was recommended to start physical therapy  He has physical therapy scheduled next week but he had severe increase of his pain in the last 2 days  Yesterday his pain was so severe he went to the emergency room  He did obtain a CT of his cervical spine which showed a disc bulge at C5-6 it  It also showed foraminal narrowing on the left side however his symptoms are all on the right  He feels sharp stabbing pain to the right shoulder blade down to the right trap and right shoulder  He feels numbness and tingling down into his right arm  The pain worsens with movement of his right arm  He was given Toradol injection yesterday as well as started on oral steroids of 60 mg for 5 days and anti-inflammatories the form naproxen  He states nothing has seemed to help him yet  Review of Systems   Constitutional: Negative for chills, fever and unexpected weight change  HENT: Negative for hearing loss, nosebleeds and sore throat  Eyes: Negative for pain, redness and visual disturbance  Respiratory: Negative for cough, shortness of breath and wheezing  Cardiovascular: Negative for chest pain, palpitations and leg swelling  Gastrointestinal: Negative for abdominal pain, nausea and vomiting  Endocrine: Negative for polyphagia and polyuria  Genitourinary: Negative for dysuria and hematuria  Musculoskeletal:        See HPI   Skin: Negative for rash and wound  Neurological: Negative for dizziness, numbness and headaches  Psychiatric/Behavioral: Negative for decreased concentration and suicidal ideas  The patient is not nervous/anxious            Past Medical History:   Diagnosis Date    Corneal abrasion     Resolved 8/23/2016     Difficult or painful urination     Resolved 8/23/2016     Hypertension     IFG (impaired fasting glucose)     Resolved 9/18/2017     Prediabetes     Resolved 9/18/2017     Vertigo        Past Surgical History:   Procedure Laterality Date    APPENDECTOMY      HERNIA REPAIR      KNEE SURGERY Right 2009    Lateral release     LA LAP,APPENDECTOMY N/A 7/30/2016    Procedure: APPENDECTOMY LAPAROSCOPIC;  Surgeon: Stephie Marks MD;  Location: [facility];  Service: General    SHOULDER SURGERY Right 2006    SLAP       Family History   Problem Relation Age of Onset    Coronary artery disease Father     Prostate cancer Father     Hypertension Family     Lymphoma Family     Skin cancer Family        Social History     Occupational History    Not on file   Tobacco Use    Smoking status: Never Smoker    Smokeless tobacco: Never Used   Vaping Use    Vaping Use: Never used   Substance and Sexual Activity    Alcohol use: Yes     Comment: rare    Drug use: No    Sexual activity: Not on file         Current Outpatient Medications:     cholecalciferol (VITAMIN D3) 1,000 units tablet, Take 1 tablet by mouth daily, Disp: , Rfl:     lidocaine (LIDODERM) 5 %, Apply 1 patch topically daily for 2 days Remove & Discard patch within 12 hours or as directed by MD, Disp: 2 patch, Rfl: 0    loratadine (Claritin) 10 mg tablet, Take 10 mg by mouth daily, Disp: , Rfl:     meloxicam (Mobic) 15 mg tablet, Take 1 tablet (15 mg total) by mouth daily for 7 days, Disp: 7 tablet, Rfl: 0    olmesartan (BENICAR) 20 mg tablet, Take 1 tablet by mouth once daily, Disp: 90 tablet, Rfl: 0    Omega-3 Fatty Acids (FISH OIL) 645 MG CAPS, Take 1 capsule by mouth daily, Disp: , Rfl:     predniSONE 20 mg tablet, Take 3 tablets (60 mg total) by mouth daily for 5 days, Disp: 15 tablet, Rfl: 0    tadalafil (CIALIS) 5 MG tablet, TAKE 1 TABLET BY MOUTH ONCE DAILY AS NEEDED FOR ERECTILE DYSFUNCTION, Disp: , Rfl:     cyclobenzaprine (FLEXERIL) 10 mg tablet, Take 1 tablet (10 mg total) by mouth daily at bedtime for 10 days, Disp: 10 tablet, Rfl: 0    fluticasone (FLONASE) 50 mcg/act nasal spray, 2 sprays into each nostril daily (Patient not taking: Reported on 2/24/2022 ), Disp: 3 Bottle, Rfl: 3    naproxen (Naprosyn) 500 mg tablet, Take 1 tablet (500 mg total) by mouth 2 (two) times a day with meals (Patient not taking: Reported on 2/18/2022 ), Disp: 10 tablet, Rfl: 0  No current facility-administered medications for this visit  Allergies   Allergen Reactions    Bee Venom     Flomax [Tamsulosin Hcl] Other (See Comments)     hypotension    Lisinopril     Pollen Extract     Tamsulosin Other (See Comments)     hypotension  hypotension       Objective:  Vitals:    02/25/22 1052   BP: (!) 158/101   Pulse: 96   Temp: (!) 97 °F (36 1 °C)       Right Shoulder Exam     Tenderness   Right shoulder tenderness location: Trapezius and rhomboid musculature      Range of Motion   Active abduction: normal   Passive abduction: normal   Extension: normal   External rotation: normal   Forward flexion: normal   Internal rotation 0 degrees: normal     Muscle Strength   Abduction: 5/5   Internal rotation: 5/5   External rotation: 5/5   Supraspinatus: 5/5   Subscapularis: 5/5   Biceps: 5/5     Tests   Valenzuela test: negative  Impingement: negative    Other   Erythema: absent  Sensation: normal  Pulse: present      Left Shoulder Exam     Tenderness   The patient is experiencing no tenderness  Physical Exam  Vitals and nursing note reviewed  Constitutional:       Appearance: He is well-developed  HENT:      Head: Normocephalic and atraumatic  Eyes:      General: No scleral icterus  Conjunctiva/sclera: Conjunctivae normal    Neck:        Comments: Positive Spurling's maneuver on the right    Full strength and sensation bilateral extremities  Cardiovascular:      Rate and Rhythm: Normal rate  Pulmonary:      Effort: Pulmonary effort is normal  No respiratory distress  Musculoskeletal:      Cervical back: Neck supple  Pain with movement, spinous process tenderness and muscular tenderness present  Decreased range of motion  Comments: As noted in HPI   Skin:     General: Skin is warm and dry  Neurological:      Mental Status: He is alert and oriented to person, place, and time  Psychiatric:         Behavior: Behavior normal          I have personally reviewed pertinent films in PACS and my interpretation is as follows:  CT of the cervical spine dated 2/24/2022 demonstrates a disc bulge at C5-6

## 2022-03-01 ENCOUNTER — EVALUATION (OUTPATIENT)
Dept: PHYSICAL THERAPY | Facility: CLINIC | Age: 47
End: 2022-03-01
Payer: COMMERCIAL

## 2022-03-01 DIAGNOSIS — S29.012A RHOMBOID MUSCLE STRAIN, INITIAL ENCOUNTER: ICD-10-CM

## 2022-03-01 PROCEDURE — 97161 PT EVAL LOW COMPLEX 20 MIN: CPT

## 2022-03-01 PROCEDURE — 97110 THERAPEUTIC EXERCISES: CPT

## 2022-03-01 NOTE — PROGRESS NOTES
PT Evaluation     Today's date: 3/1/2022  Patient name: Arvie Homans  : 1975  MRN: [id number]  Referring provider: Ti Caldwell DO  Dx:   Encounter Diagnosis     ICD-10-CM    1  Rhomboid muscle strain, initial encounter  S29 012A Ambulatory referral to Physical Therapy                  Assessment  Assessment details: Arvie Homans is a 56 y o  male who presents with signs and symptoms consistent with right-sided cervical radiculopathy  Patient presents with radiating symptoms in right median nerve distribution  Patient presents with the following impairments: right arm and periscapular pain, limited cervical AROM, limited deep neck flexor strength, postural dysfunction, and limited UE neurodynamics  Due to these impairments, patient has difficulty performing the following: prolonged sitting, reaching, pushing, pulling, lifting, carrying, and participating in prior exercise regimen  Patient has been educated in home exercise program and plan of care  Patient was also educated in the "stoplight" analogy for exercise response and verbalized understanding of this  Patient would benefit from skilled physical therapy services to address the above functional limitations and progress towards prior level of function and independence with home exercise program   Impairments: abnormal muscle firing, abnormal or restricted ROM, activity intolerance, impaired physical strength, lacks appropriate home exercise program, pain with function, scapular dyskinesis, poor posture  and poor body mechanics  Understanding of Dx/Px/POC: good   Prognosis: good    Goals  Short Term Goals (3 weeks; target date: 4/15/22)  1  Patient will be independent with initial HEP  2  Patient will demonstrate an increase in cervical extension AROM 5 degrees  Long Term Goals (6 weeks; target date: 5/15/22)  1  Patient will demonstrate an increase in cervical AROM to WNL in order to promote self-care activities pain-free    2  Patient will be able to tolerate sitting for 1 hour without limitation  3  Patient will be independent with comprehensive HEP  4  Patient will be able to initiate prior exercise regimen with pain of 2/10 at worst      Plan  Plan details: Patient has been educated on the facility's COVID precautions and procedures  Patient has also been educated on the facility's cancellation policy and has verbalized agreement with this  Patient would benefit from: skilled physical therapy  Planned modality interventions: cryotherapy, electrical stimulation/Russian stimulation, TENS, ultrasound, thermotherapy: hydrocollator packs, traction, high voltage pulsed current: spasm management and high voltage pulsed current: pain management  Planned therapy interventions: flexibility, functional ROM exercises, graded exercise, home exercise program, joint mobilization, manual therapy, neuromuscular re-education, patient education, strengthening, stretching, therapeutic exercise, therapeutic activities, balance/weight bearing training, gait training, abdominal trunk stabilization, self care, postural training, activity modification, ADL retraining, ADL training, balance, behavior modification, body mechanics training, breathing training, therapeutic training, transfer training, graded activity, graded motor, muscle pump exercises, Lawrence taping and IADL retraining  Frequency: 2x week  Duration in weeks: 6  Plan of Care beginning date: 3/1/2022  Plan of Care expiration date: 6/1/2022  Treatment plan discussed with: patient        Subjective Evaluation    History of Present Illness  Date of onset: 1/15/2022  Mechanism of injury: Pt reports pain in the right shoulder blade and traveling down the right arm into the hand that began over one month ago  Pt states that prior to the onset of this pain, he was working out regularly  Pt states that he initially saw orthopedics for pain in the right scapula   Pt states that he was referred to PT at that time and was provided with exercises to strengthen the rhomboids  Pt states that at the time, doing these exercises made the right shoulder blade feel worse  Pt states that a few days later, his pain got worse and he went to the ED  Pt states that he had a CT scan, which showed a disc problem in the neck on the left side, but his symptoms are all on the right  Pt states he returned to orthopedics and was provided with oral steroid medication, which he feels has been helping  Pt was also diagnosed with cervical radiculopathy  Pt states that his primary complaint is pain that radiates to the right shoulder blade and down the right arm and into digits 1-3 mainly  Pt states that he is a  and has been out of work temporarily  Pt states pain is worse when sitting or standing for a while and relieved when changing positions     Pain  Current pain ratin  At best pain rating: 3  At worst pain rating: 10  Location: Right shoulder blade  Quality: sharp and dull ache (Combination of sharp, dull, and numb)  Relieving factors: change in position and medications  Aggravating factors: sitting, overhead activity and lifting  Progression: improved    Social Support  Lives with: spouse and young children    Employment status: working ()  Hand dominance: right  Exercise history: 3-4x a week; run 2 miles - strength training, body weight exercise, stretching      Diagnostic Tests  CT scan: abnormal  Treatments  Previous treatment: medication (Physician directed exercise program)  Patient Goals  Patient goals for therapy: decreased pain, return to sport/leisure activities and increased strength          Objective     Postural Observations    Additional Postural Observation Details  Slight forward head, rounded shoulder posture while seated     Active Range of Motion   Cervical/Thoracic Spine       Cervical    Flexion: 40 (Radiates to R shoulder blade) degrees  with pain  Extension: 45 degrees with pain  Left lateral flexion: 40 degrees      Right lateral flexion: 42 degrees      Left rotation: 72 degrees  Right rotation: 70 degrees    with pain    Right Shoulder   Flexion: 170 degrees   Abduction: 165 degrees   External rotation 0°: 55 degrees with pain  Internal rotation 0°: WFL    Additional Active Range of Motion Details  Pt notes strain of right UT/LS while rotating and sidebending to the right; stretching of same musculature when sidebending or rotating left    Strength/Myotome Testing   Cervical Spine   Neck flexion: 5    Left   Interossei strength (t1): 5  Neck lateral flexion (C3): 5  Levator scapulae (C4): 5    Right   Interossei strength (t1): 5  Neck lateral flexion (C3): 4  Levator scapulae (C4): 5    Left Shoulder     Planes of Motion   Abduction: 5     Isolated Muscles   Levator scapulae: 5     Right Shoulder     Planes of Motion   Abduction: 5     Isolated Muscles   Levator scapulae: 5     Left Elbow   Flexion: 5  Extension: 5    Right Elbow   Flexion: 5  Extension: 5    Left Wrist/Hand   Thumb extension: 5    Right Wrist/Hand   Thumb extension: 5    Tests   Cervical   Positive cervical distraction test       Repeated motions Position Repetitions performed Symptomatic response during Symptomatic response after   Flexion Seated  10 No effect No effect   Extension Seated 10 Peripheralizing Peripheralized    Retraction Seated 10 Peripheralizing Peripheralized   Protraction Seated 10 Peripheralizing No worse     Comments: Pt noted most intense symptoms with repeated retraction    Upper Limb Tension Testing  Right median nerve tension testing = (+)          Precautions: Standard       EVAL 2 3 4 5   Manuals 3/1/22       STM/MFR        Manual distraction, SOR Tested       Joint mobs                 Neuro Re-Ed        Scap retractions                                                        Ther Ex        Median nerve glide Instructed RUE       Self suboccipital release Instructed w/ tennis balls Seated thoracic extension        Open book                                                        Ther Activity        Pt education POC, HEP       Postural education Performed                                               Modalities                          Access Code: [email or web address]  URL: [email or web address]  Date: 03/01/2022  Prepared by: Louise Fall    Exercises  Median Nerve Flossing - Tray - 3 x daily - 7 x weekly - 1 sets - 30 reps

## 2022-03-08 ENCOUNTER — OFFICE VISIT (OUTPATIENT)
Dept: PHYSICAL THERAPY | Facility: CLINIC | Age: 47
End: 2022-03-08
Payer: COMMERCIAL

## 2022-03-08 DIAGNOSIS — S29.012A RHOMBOID MUSCLE STRAIN, INITIAL ENCOUNTER: Primary | ICD-10-CM

## 2022-03-08 PROCEDURE — 97110 THERAPEUTIC EXERCISES: CPT

## 2022-03-08 PROCEDURE — 97140 MANUAL THERAPY 1/> REGIONS: CPT

## 2022-03-08 PROCEDURE — 97112 NEUROMUSCULAR REEDUCATION: CPT

## 2022-03-08 NOTE — PROGRESS NOTES
Daily Note      Today's date: 3/8/2022  Patient name: Reji Obando  : 1975  MRN: [id number]  Referring provider: Amy Peterson DO  Dx:   Encounter Diagnosis     ICD-10-CM    1  Rhomboid muscle strain, initial encounter  S29 012A        Subjective: Pt reports neck pain is relatively unchanged compared to initial visit  Pt states that performing the nerve glide exercise has not increased pain in the arm  Objective: See treatment diary below    Repeated motions Position Repetitions performed Symptomatic response during Symptomatic response after   Flexion Seated 10  No effect No effect   Retraction Seated 10 No effect  No effect     Comments:    Assessment: Pt tolerated treatment well  Pt continues to demonstrate inconsistent response with repeated motions, however noted no increase in symptoms with repeated flexion and retraction this visit  Pt introduced to open book and noted more restriction in the left pec muscle group compared to right  Pt introduced to rows and shoulder extensions and noted no increase in symptoms and maintained good form  Plan: Continue per plan of care  Progress treatment as tolerated            Precautions: Standard       EVAL 2 3 4 5   Manuals 3/1/22 3/8/22      STM/MFR        Manual distraction, SOR Tested Performed      Joint mobs         Manual flexibility  B/L UT, LS      Neuro Re-Ed        Chin tucks  10x supine       Rows  2x10 17 5#      Shoulder extensions  2x10 12 5#                                              Ther Ex        Median nerve glide Instructed RUE 30x       Self suboccipital release Instructed w/ tennis balls Reviewed      Seated thoracic extension        Open book  15x B/L                                                       Ther Activity        Pt education POC, HEP Reviewed      Postural education Performed Reviewed                                              Modalities                          Access Code: [email or web address]  URL: [email or web address]  Date: 03/01/2022  Prepared by: Hayder Estrada  Median Nerve Flossing - Tray - 3 x daily - 7 x weekly - 1 sets - 30 reps  Open book - 1x daily - 7x weekly - 1 set -15-20 reps

## 2022-03-10 ENCOUNTER — OFFICE VISIT (OUTPATIENT)
Dept: PHYSICAL THERAPY | Facility: CLINIC | Age: 47
End: 2022-03-10
Payer: COMMERCIAL

## 2022-03-10 DIAGNOSIS — S29.012A RHOMBOID MUSCLE STRAIN, INITIAL ENCOUNTER: Primary | ICD-10-CM

## 2022-03-10 PROCEDURE — 97112 NEUROMUSCULAR REEDUCATION: CPT

## 2022-03-10 PROCEDURE — 97110 THERAPEUTIC EXERCISES: CPT

## 2022-03-10 PROCEDURE — 97140 MANUAL THERAPY 1/> REGIONS: CPT

## 2022-03-10 NOTE — PROGRESS NOTES
Daily Note      Today's date: 3/10/2022  Patient name: Nazanin Torres  : 1975  MRN: [id number]  Referring provider: Carolyn Aaron DO  Dx:   Encounter Diagnosis     ICD-10-CM    1  Rhomboid muscle strain, initial encounter  S29 012A        Subjective: Pt reports "Its actually the best felipe felt"        Objective: See treatment diary below    Assessment: Pt tolerated treatment well  Pt would benefit from continued PT   and Pt demonstrated good form with therex today  Pt demo good tolerance with exercises, incorporated more T/S mobility exercises and edu to perform at home    Plan: Continue per plan of care  Progress treatment as tolerated  Precautions: Standard       EVAL 2 3 4 5   Manuals 3/1/22 3/8/22 3/10/22     STM/MFR        Manual distraction, SOR Tested Performed performed     Joint mobs         Manual flexibility  B/L UT, LS B/L UT, LS     Neuro Re-Ed        Chin tucks  10x supine  10x supine     Rows  2x10 17 5# 2x10 17 5# B     Shoulder extensions  2x10 12 5# 2x10 17 5# B                                     Ther Ex        Median nerve glide Instructed RUE 30x  30x     Self suboccipital release Instructed w/ tennis balls Reviewed Rev     Seated thoracic extension   1/2 foam 15x     Open book  15x B/L  20x B/L     Standing T/S ext at wall arms extending   2x10     Self foam roll   Rev                                     Ther Activity        Pt education POC, HEP Reviewed Rev     Postural education Performed Reviewed Rev                                             Modalities                          Access Code: [email or web address]  URL: [email or web address]  Date: 2022  Prepared by: Raza Cagle    Exercises  Median Nerve Flossing - Tray - 3 x daily - 7 x weekly - 1 sets - 30 reps  Open book - 1x daily - 7x weekly - 1 set -15-20 reps

## 2022-03-15 ENCOUNTER — OFFICE VISIT (OUTPATIENT)
Dept: PHYSICAL THERAPY | Facility: CLINIC | Age: 47
End: 2022-03-15
Payer: COMMERCIAL

## 2022-03-15 DIAGNOSIS — S29.012A RHOMBOID MUSCLE STRAIN, INITIAL ENCOUNTER: Primary | ICD-10-CM

## 2022-03-15 PROCEDURE — 97110 THERAPEUTIC EXERCISES: CPT

## 2022-03-15 PROCEDURE — 97140 MANUAL THERAPY 1/> REGIONS: CPT

## 2022-03-15 PROCEDURE — 97112 NEUROMUSCULAR REEDUCATION: CPT

## 2022-03-15 NOTE — PROGRESS NOTES
Daily Note      Today's date: 3/15/2022  Patient name: Cristal Vicente  : 1975  MRN: [id number]  Referring provider: Octavio Balderas DO  Dx:   Encounter Diagnosis     ICD-10-CM    1  Rhomboid muscle strain, initial encounter  S29 012A        Subjective: Pt reports that he has increased pain in the right shoulder blade  Pt is unable to identify a specific trigger for this pain  Pt states that overall, his neck pain is improving  Pt also states that he is able to sleep better  Pt states that he wears lidocaine patches when his symptoms are severe, which he feels is helping  Objective: See treatment diary below; obtained pt's verbal informed consent prior to performing grade 5 thoracic P-A mobilization; (-) cavitation throughout the thoracic spine    Assessment: Pt tolerated treatment well  Educated pt on appropriate activity modification based on right periscapular symptoms  Pt verbalized understanding of this concept  Pt introduced to B/L ER and horizontal abduction and noted soreness between both shoulder blades, without pain  Pt demonstrates good form with exercises and good recall of his current exercise program      Plan: Continue per plan of care  Progress treatment as tolerated            Precautions: Standard       EVAL 2 3 4 5   Manuals 3/1/22 3/8/22 3/10/22 3/15/22    STM/MFR    R rhomboid, middle trap, lower trap     Manual distraction, SOR Tested Performed performed Performed    Joint mobs     P-A glide thoracic gr III, gr V     Manual flexibility  B/L UT, LS B/L UT, LS     Neuro Re-Ed        Chin tucks  10x supine  10x supine 2x10 supine    Rows  2x10 17 5# 2x10 17 5# B 2x10 17 5# B    Shoulder extensions  2x10 12 5# 2x10 17 5# B 2x10 17 5# B    B/L ER    2x10 RTB    B/L horizontal abduction    2x10 RTB                     Ther Ex        Median nerve glide Instructed RUE 30x  30x     Self suboccipital release Instructed w/ tennis balls Reviewed Rev     Seated thoracic extension   1/2 foam 15x 2x10 1/2 foam    Open book  15x B/L  20x B/L 20x B/L     Standing T/S ext at wall arms extending   2x10     Self foam roll   Rev                                     Ther Activity        Pt education POC, HEP Reviewed Rev     Postural education Performed Reviewed Rev                                             Modalities                          Access Code: [email or web address]  URL: [email or web address]  Date: 03/01/2022  Prepared by: Edwin Montiel    Exercises  Median Nerve Flossing - Tray - 3 x daily - 7 x weekly - 1 sets - 30 reps  Open book - 1x daily - 7x weekly - 1 set -15-20 reps

## 2022-03-17 ENCOUNTER — OFFICE VISIT (OUTPATIENT)
Dept: PHYSICAL THERAPY | Facility: CLINIC | Age: 47
End: 2022-03-17
Payer: COMMERCIAL

## 2022-03-17 DIAGNOSIS — S29.012A RHOMBOID MUSCLE STRAIN, INITIAL ENCOUNTER: Primary | ICD-10-CM

## 2022-03-17 PROCEDURE — 97110 THERAPEUTIC EXERCISES: CPT

## 2022-03-17 PROCEDURE — 97112 NEUROMUSCULAR REEDUCATION: CPT

## 2022-03-17 PROCEDURE — 97140 MANUAL THERAPY 1/> REGIONS: CPT

## 2022-03-17 NOTE — PROGRESS NOTES
Daily Note      Today's date: 3/17/2022  Patient name: Diego Blankenship  : 1975  MRN: [id number]  Referring provider: Cassie Padilla DO  Dx:   Encounter Diagnosis     ICD-10-CM    1  Rhomboid muscle strain, initial encounter  S29 012A        Subjective: Pt reports that his neck does not hurt as much as it used to, but still does have soreness in the back of the right shoulder blade  Pt states that he also has numbness and tingling down the right arm and into the 1st, 2nd, and 3rd fingers  Objective: See treatment diary below    Assessment: Pt tolerated treatment well  Pt's upper limb nerve tension was tested and noted (+) median and ulnar tests on the right  Pt noted most provocation of symptoms with median nerve tension test  Performed manual median nerve glides and instructed pt in self nerve glides  Pt noted relief of symptoms after performing these glides shortly after completion of the exercises  Educated pt to include this exercise in HEP  Plan: Continue per plan of care  Progress treatment as tolerated            Precautions: Standard       EVAL 2 3 4 5   Manuals 3/1/22 3/8/22 3/10/22 3/15/22 3/17/22   STM/MFR    R rhomboid, middle trap, lower trap     Manual distraction, SOR Tested Performed performed Performed Performed   Joint mobs     P-A glide thoracic gr III, gr V     Manual flexibility  B/L UT, LS B/L UT, LS  Median, radial, ulnar nerve glides RUE   Neuro Re-Ed        Chin tucks  10x supine  10x supine 2x10 supine 2x10 supine   Rows  2x10 17 5# 2x10 17 5# B 2x10 17 5# B 2x10 17 5#    Shoulder extensions  2x10 12 5# 2x10 17 5# B 2x10 17 5# B 2x10 17 5#    B/L ER    2x10 RTB Reviewed   B/L horizontal abduction    2x10 RTB  Reviewed                   Ther Ex        Median nerve glide Instructed RUE 30x  30x  30x    Self suboccipital release Instructed w/ tennis balls Reviewed Rev     Seated thoracic extension   1/2 foam 15x 2x10 1/2 foam 2x10 1/2 foam   Open book  15x B/L  20x B/L 20x B/L  20x B/L   Standing T/S ext at wall arms extending   2x10     Self foam roll   Rev                                     Ther Activity        Pt education POC, HEP Reviewed Rev  Reviewed   Postural education Performed Reviewed Rev  Reviewed                                           Modalities                          Access Code: [email or web address]  URL: [email or web address]  Date: 03/01/2022  Prepared by: Jaime Byers    Exercises  Median Nerve Flossing - Tray - 3 x daily - 7 x weekly - 1 sets - 30 reps  Open book - 1x daily - 7x weekly - 1 set -15-20 reps

## 2022-03-22 ENCOUNTER — APPOINTMENT (OUTPATIENT)
Dept: PHYSICAL THERAPY | Facility: CLINIC | Age: 47
End: 2022-03-22
Payer: COMMERCIAL

## 2022-03-24 ENCOUNTER — OFFICE VISIT (OUTPATIENT)
Dept: PHYSICAL THERAPY | Facility: CLINIC | Age: 47
End: 2022-03-24
Payer: COMMERCIAL

## 2022-03-24 DIAGNOSIS — S29.012A RHOMBOID MUSCLE STRAIN, INITIAL ENCOUNTER: Primary | ICD-10-CM

## 2022-03-24 PROCEDURE — 97112 NEUROMUSCULAR REEDUCATION: CPT

## 2022-03-24 PROCEDURE — 97110 THERAPEUTIC EXERCISES: CPT

## 2022-03-24 PROCEDURE — 97140 MANUAL THERAPY 1/> REGIONS: CPT

## 2022-03-24 NOTE — PROGRESS NOTES
Daily Note      Today's date: 3/24/2022  Patient name: Emma Glaser  : 1975  MRN: [id number]  Referring provider: Nenita Vera DO  Dx:   Encounter Diagnosis     ICD-10-CM    1  Rhomboid muscle strain, initial encounter  S29 012A        Subjective: Pt reports that he returned to the gym within the past week and found that his right arm was a lot weaker than the other  Pt states that when attempting pull ups, dips, or pressing, his right arm did not have as much control as the other  Pt states that it felt as if the right arm was "falling off track," while the left arm was tracking normally  Pt states that his neck and arm feel "a little better "       Objective: See treatment diary below     dynamometer tested standing with elbow bent at 90°  L = 70, 74, 78 lbs   R = 48, 58, 60 lbs     Assessment: Pt tolerated treatment well  Pt noted stretching and mild relief of R arm symptoms with median nerve glides manually  Due to patient's recent complaints of notable limited strength in the right arm,  strength was tested and pt demonstrated decreased strength in the R hand compared to L despite being R hand dominant  Educated pt on potential implications of limited strength in the R hand/arm and pt verbalized understanding  Plan to communicate strength differential to referring provider  Plan: Continue per plan of care  Progress treatment as tolerated            Precautions: Standard       6 2 3 4 5   Manuals 3/24/22 3/8/22 3/10/22 3/15/22 3/17/22   STM/MFR    R rhomboid, middle trap, lower trap     Manual distraction, SOR Performed Performed performed Performed Performed   Joint mobs     P-A glide thoracic gr III, gr V     Manual flexibility Median, ulnar nerve glides RUE B/L UT, LS B/L UT, LS  Median, radial, ulnar nerve glides RUE   Neuro Re-Ed        Chin tucks 2x10 supine 10x supine  10x supine 2x10 supine 2x10 supine   Rows 2x10 GTB 2x10 17 5# 2x10 17 5# B 2x10 17 5# B 2x10 17 5# Shoulder extensions  2x10 12 5# 2x10 17 5# B 2x10 17 5# B 2x10 17 5#    B/L ER    2x10 RTB Reviewed   B/L horizontal abduction    2x10 RTB  Reviewed                   Ther Ex        Median nerve glide  30x  30x  30x    Self suboccipital release  Reviewed Rev     Seated thoracic extension 2x10 1/2 foam  1/2 foam 15x 2x10 1/2 foam 2x10 1/2 foam   Open book 20x B/L  15x B/L  20x B/L 20x B/L  20x B/L   Standing T/S ext at wall arms extending   2x10     Self foam roll   Rev                                     Ther Activity        Pt education Differential disuse, muscle, nerve-related weakness Reviewed Rev  Reviewed   Postural education  Reviewed Rev  Reviewed                                           Modalities                          Access Code: [email or web address]  URL: [email or web address]  Date: 03/01/2022  Prepared by: Jaime Byers    Exercises  Median Nerve Flossing - Tray - 3 x daily - 7 x weekly - 1 sets - 30 reps  Open book - 1x daily - 7x weekly - 1 set -15-20 reps

## 2022-03-29 ENCOUNTER — OFFICE VISIT (OUTPATIENT)
Dept: PHYSICAL THERAPY | Facility: CLINIC | Age: 47
End: 2022-03-29
Payer: COMMERCIAL

## 2022-03-29 DIAGNOSIS — S29.012A RHOMBOID MUSCLE STRAIN, INITIAL ENCOUNTER: Primary | ICD-10-CM

## 2022-03-29 PROCEDURE — 97112 NEUROMUSCULAR REEDUCATION: CPT

## 2022-03-29 PROCEDURE — 97140 MANUAL THERAPY 1/> REGIONS: CPT

## 2022-03-29 PROCEDURE — 97110 THERAPEUTIC EXERCISES: CPT

## 2022-03-29 PROCEDURE — 97012 MECHANICAL TRACTION THERAPY: CPT

## 2022-03-29 NOTE — PROGRESS NOTES
Daily Note      Today's date: 3/29/2022  Patient name: Emma Glaser  : 1975  MRN: [id number]  Referring provider: Nenita Vera DO  Dx:   Encounter Diagnosis     ICD-10-CM    1  Rhomboid muscle strain, initial encounter  S29 012A        Subjective: Pt reports that his right shoulder and shoulder blade area are sore, which he attributes to painting a wall in his house for about one hour  Pt states that it not hurt while painting, but several hours later it felt tired  Pt states that if he were to try this a month ago, he does not think he would have been able to do it  Objective: See treatment diary below    Assessment: Pt tolerated treatment well  Pt introduced to cervical mechanical traction and noted feeling good during treatment  Pt demonstrates good carryover of periscapular activation exercises  Reviewed thoracic mobility exercises as these were not performed today due to time constraints  Plan to re-assess during next visit and advised follow up with orthopedics  Plan: Continue per plan of care  Progress treatment as tolerated            Precautions: Standard       6 7 3 4 5   Manuals 3/24/22 3/29/22 3/10/22 3/15/22 3/17/22   STM/MFR    R rhomboid, middle trap, lower trap     Manual distraction, SOR Performed Performed performed Performed Performed   Joint mobs     P-A glide thoracic gr III, gr V     Manual flexibility Median, ulnar nerve glides RUE  B/L UT, LS  Median, radial, ulnar nerve glides RUE   Neuro Re-Ed        Chin tucks 2x10 supine 2x10 supine  10x supine 2x10 supine 2x10 supine   Rows 2x10 GTB 2x10 GTB 2x10 17 5# B 2x10 17 5# B 2x10 17 5#    Shoulder extensions  2x10 GTB 2x10 17 5# B 2x10 17 5# B 2x10 17 5#    B/L ER  2x10 RTB  2x10 RTB Reviewed   B/L horizontal abduction  2x10 RTB  2x10 RTB  Reviewed                   Ther Ex        Median nerve glide  30x  30x  30x    Self suboccipital release   Rev     Seated thoracic extension 2x10 1/2 foam  1/2 foam 15x 2x10 1/2 foam 2x10 1/2 foam   Open book 20x B/L  15x B/L  20x B/L 20x B/L  20x B/L   Standing T/S ext at wall arms extending   2x10     Self foam roll   Rev     Doorway stretch  5x10s                               Ther Activity        Pt education Differential disuse, muscle, nerve-related weakness Reviewed Rev  Reviewed   Postural education  Reviewed Rev  Reviewed                                           Modalities        Cervical mechanical traction  10' static, 5-20# supine at 30°                Access Code: [email or web address]  URL: [email or web address]  Date: 03/01/2022  Prepared by: Romulo Murrell    Exercises  Median Nerve Flossing - Tray - 3 x daily - 7 x weekly - 1 sets - 30 reps  Open book - 1x daily - 7x weekly - 1 set -15-20 reps

## 2022-03-31 ENCOUNTER — EVALUATION (OUTPATIENT)
Dept: PHYSICAL THERAPY | Facility: CLINIC | Age: 47
End: 2022-03-31
Payer: COMMERCIAL

## 2022-03-31 DIAGNOSIS — S29.012A RHOMBOID MUSCLE STRAIN, INITIAL ENCOUNTER: Primary | ICD-10-CM

## 2022-03-31 PROCEDURE — 97112 NEUROMUSCULAR REEDUCATION: CPT

## 2022-03-31 PROCEDURE — 97110 THERAPEUTIC EXERCISES: CPT

## 2022-03-31 PROCEDURE — 97140 MANUAL THERAPY 1/> REGIONS: CPT

## 2022-03-31 NOTE — PROGRESS NOTES
PT Re-Evaluation     Today's date: 3/31/2022  Patient name: Ernestina Lebron  : 1975  MRN: [id number]  Referring provider: Juan Carlos Clay DO  Dx:   Encounter Diagnosis     ICD-10-CM    1  Rhomboid muscle strain, initial encounter  S29 012A                   Assessment  Assessment details: Ernestina Lebron has been seen for 8 visits for chronic neck pain with right-sided cervical radiculopathy  Patient has made good progress toward short-term and long-term physical therapy goals  Patient demonstrates improvements in the following: self-reports of neck pain, cervical AROM, neck strength, postural alignment, and periscapular muscle activation  Patient continues to present with symptoms in a right median nerve distribution, which have begun to centralize toward the right neck and shoulder  Patient continues to present with the following impairments: right neck, arm, and hand pain, limited deep neck flexor strength, postural dysfunction, and limited UE neurodynamics  Due to these impairments, patient continues to have difficulty performing the following: prolonged sitting, repetitive overhead reaching, pushing, pulling, lifting, carrying, and participating in prior exercise regimen  Patient has been educated in updated plan of care  Patient would benefit from continued skilled physical therapy services in order to further progress toward PLOF and independence with an appropriate comprehensive HEP  Impairments: abnormal muscle firing, abnormal or restricted ROM, activity intolerance, impaired physical strength, lacks appropriate home exercise program, pain with function, scapular dyskinesis, poor posture  and poor body mechanics  Understanding of Dx/Px/POC: good   Prognosis: good    Goals  Short Term Goals (3 weeks; target date: 4/15/22)  1  (Goal Met) Patient will be independent with initial HEP   2  (Goal Met) Patient will demonstrate an increase in cervical extension AROM 5 degrees      Long Term Goals (6 weeks; target date: 5/15/22)  1  (75% Met) Patient will demonstrate an increase in cervical AROM to WNL in order to promote self-care activities pain-free  2  (Goal Met) Patient will be able to tolerate sitting for 1 hour without limitation  3  (TBA) Patient will be independent with comprehensive HEP    4  (50% Met) Patient will be able to initiate prior exercise regimen with pain of 2/10 at worst    5  (New Goal) Patient will no longer be limited in ADLs  Plan  Patient would benefit from: skilled physical therapy  Planned modality interventions: cryotherapy, electrical stimulation/Russian stimulation, TENS, ultrasound, thermotherapy: hydrocollator packs, traction, high voltage pulsed current: spasm management and high voltage pulsed current: pain management  Planned therapy interventions: flexibility, functional ROM exercises, graded exercise, home exercise program, joint mobilization, manual therapy, neuromuscular re-education, patient education, strengthening, stretching, therapeutic exercise, therapeutic activities, balance/weight bearing training, gait training, abdominal trunk stabilization, self care, postural training, activity modification, ADL retraining, ADL training, balance, behavior modification, body mechanics training, breathing training, therapeutic training, transfer training, graded activity, graded motor, muscle pump exercises, Lawrence taping and IADL retraining  Frequency: 2x week  Duration in weeks: 4  Plan of Care beginning date: 3/31/2022  Plan of Care expiration date: 6/1/2022  Treatment plan discussed with: patient        Subjective Evaluation    History of Present Illness  Date of onset: 1/15/2022  Mechanism of injury: Pt reports pain in the right shoulder blade and traveling down the right arm into the hand that began over one month ago  Pt states that prior to the onset of this pain, he was working out regularly   Pt states that he initially saw orthopedics for pain in the right scapula  Pt states that he was referred to PT at that time and was provided with exercises to strengthen the rhomboids  Pt states that at the time, doing these exercises made the right shoulder blade feel worse  Pt states that a few days later, his pain got worse and he went to the ED  Pt states that he had a CT scan, which showed a disc problem in the neck on the left side, but his symptoms are all on the right  Pt states he returned to orthopedics and was provided with oral steroid medication, which he feels has been helping  Pt was also diagnosed with cervical radiculopathy  Pt states that his primary complaint is pain that radiates to the right shoulder blade and down the right arm and into digits 1-3 mainly  Pt states that he is a  and has been out of work temporarily  Pt states pain is worse when sitting or standing for a while and relieved when changing positions  (3/31/22) Pt reports progress of 50% since starting PT  Pt states that the numbness and tingling in the right hand has decreased, although it is still there  Pt states radiating symptoms to the right shoulder blade have improved  Pt states that he used to have weird feelings in his right forearm and elbow which are now gone  Pt states the pain is mostly on the right side of his neck now  Pt states he has noticed he can do a lot more now, such as painting his wall     Pain  Current pain ratin  At best pain ratin  At worst pain ratin  Location: Right-sided neck  Quality: sharp and dull ache (Combination of sharp, dull, and numb)  Relieving factors: change in position and medications  Aggravating factors: sitting, overhead activity and lifting  Progression: improved    Social Support  Lives with: spouse and young children    Employment status: working ()  Hand dominance: right  Exercise history: 3-4x a week; run 2 miles - strength training, body weight exercise, stretching      Diagnostic Tests  CT scan: abnormal  Treatments  Previous treatment: medication (Physician directed exercise program)  Patient Goals  Patient goals for therapy: decreased pain, return to sport/leisure activities and increased strength          Objective     Postural Observations    Additional Postural Observation Details  Slight forward head, rounded shoulder posture while seated     Active Range of Motion   Cervical/Thoracic Spine       Cervical    Flexion: 42 (Radiates to R upper trap) degrees  with pain  Extension: 45 degrees     with pain  Left lateral flexion: 42 degrees      Right lateral flexion: 42 degrees      Left rotation: 78 degrees  Right rotation: 80 degrees    with pain    Right Shoulder   Flexion: 170 degrees   Abduction: 165 degrees   External rotation 0°: 55 degrees with pain  Internal rotation 0°: [facility]    Additional Active Range of Motion Details  Pt notes "pinching" when tilting head right and turning head right   Pt notes stretching of R UT when tilting head left     Strength/Myotome Testing   Cervical Spine   Neck flexion: 5    Left   Interossei strength (t1): 5  Neck lateral flexion (C3): 5  Levator scapulae (C4): 5    Right   Interossei strength (t1): 5  Neck lateral flexion (C3): 5  Levator scapulae (C4): 5    Left Shoulder     Planes of Motion   Abduction: 5     Isolated Muscles   Levator scapulae: 5     Right Shoulder     Planes of Motion   Abduction: 5     Isolated Muscles   Levator scapulae: 5     Left Elbow   Flexion: 5  Extension: 5    Right Elbow   Flexion: 5  Extension: 5    Left Wrist/Hand   Thumb extension: 5    Right Wrist/Hand   Thumb extension: 5    Tests   Cervical   Positive cervical distraction test       Repeated motions Position Repetitions performed Symptomatic response during Symptomatic response after   Flexion Seated  10 No effect No effect   Extension Seated 10 No effect No effect   Retraction Seated 10 No effect No effect   Protraction Seated 10 No effect No effect     Comments: symptoms are not reproduced with repeated motion testing during re-eval 3/31    Upper Limb Tension Testing  Right median nerve tension testing = (+)          Precautions: Standard       6 7 8 4 5   Manuals 3/24/22 3/29/22 3/31/22 3/15/22 3/17/22   STM/MFR    R rhomboid, middle trap, lower trap     Manual distraction, SOR Performed Performed Performed Performed Performed   Joint mobs     P-A glide thoracic gr III, gr V     Manual flexibility Median, ulnar nerve glides RUE  Median nerve glides RUE   Median, radial, ulnar nerve glides RUE   Neuro Re-Ed        Chin tucks 2x10 supine 2x10 supine  2x10 w/ forehead lift  2x10 supine 2x10 supine   Rows 2x10 GTB 2x10 GTB 2x10 17 5#  2x10 17 5# B 2x10 17 5#    Shoulder extensions  2x10 GTB 2x10 14# 2x10 17 5# B 2x10 17 5#    B/L ER  2x10 RTB 2x10 RTB 2x10 RTB Reviewed   B/L horizontal abduction  2x10 RTB 2x10 RTB 2x10 RTB  Reviewed                   Ther Ex        Median nerve glide  30x  30x  30x    Self suboccipital release        Seated thoracic extension 2x10 1/2 foam  20x over FR  2x10 1/2 foam 2x10 1/2 foam   Open book 20x B/L  15x B/L  15x B/L 20x B/L  20x B/L   Standing T/S ext at wall arms extending        Self foam roll        Doorway stretch  5x10s                               Ther Activity        Pt education Differential disuse, muscle, nerve-related weakness Reviewed Updated POC   Reviewed   Postural education  Reviewed   Reviewed                                           Modalities        Cervical mechanical traction  10' static, 5-20# supine at 30°                Access Code: [email or web address]  URL: [email or web address]  Date: 03/01/2022  Prepared by: Diana Ortiz    Exercises  Median Nerve Flossing - Tray - 3 x daily - 7 x weekly - 1 sets - 30 reps  Open book - 1x daily - 7x weekly - 1 set -15-20 reps

## 2022-04-05 ENCOUNTER — OFFICE VISIT (OUTPATIENT)
Dept: PHYSICAL THERAPY | Facility: CLINIC | Age: 47
End: 2022-04-05
Payer: COMMERCIAL

## 2022-04-05 DIAGNOSIS — S29.012A RHOMBOID MUSCLE STRAIN, INITIAL ENCOUNTER: Primary | ICD-10-CM

## 2022-04-05 PROCEDURE — 97140 MANUAL THERAPY 1/> REGIONS: CPT | Performed by: PHYSICAL THERAPIST

## 2022-04-05 PROCEDURE — 97110 THERAPEUTIC EXERCISES: CPT | Performed by: PHYSICAL THERAPIST

## 2022-04-05 NOTE — PROGRESS NOTES
Daily Note     Today's date: 2022  Patient name: Benita Roberts  : 1975  MRN: [id number]  Referring provider: Adeel Saba DO  Dx:   Encounter Diagnosis     ICD-10-CM    1  Rhomboid muscle strain, initial encounter  S29 012A                   Subjective: Patient reports being about the same entering treatment today  Reports hand symptoms and upper trap regional discomfort  Symptoms 3/10 pain entering session today  Reports being unable to workout at this time as he has hard time pressing  Objective: See treatment diary below  Manual time includes assessment    Assessment: Tolerated treatment well  Patient reported symptoms in arm following repeated cervical retraction with extension with wiggle  Patient educated to perform said exercise at home  He is also performing nerve glides at home  Plan: Continue per plan of care        Precautions: Standard     6 7 8 9 5   Manuals 3/24/22 3/29/22 3/31/22 4/5/22 3/17/22   STM/MFR        Manual distraction, SOR Performed Performed Performed Retraction, traction wiggle 10x Performed   Joint mobs         Manual flexibility Median, ulnar nerve glides RUE  Median nerve glides RUE   Median, radial, ulnar nerve glides RUE   Neuro Re-Ed        Chin tucks 2x10 supine 2x10 supine  2x10 w/ forehead lift  HOLD 2x10 supine   Rows 2x10 GTB 2x10 GTB 2x10 17 5#  2*10, 17 5# 2x10 17 5#    Shoulder extensions  2x10 GTB 2x10 14# 2x10 14# 2x10 17 5#    B/L ER  2x10 RTB 2x10 RTB Held Reviewed   B/L horizontal abduction  2x10 RTB 2x10 RTB  Reviewed   cerv retraction with OP    10x    cer retraction with extn with wiggle    3*10                                     Ther Ex        Median nerve glide  30x  30x HEP 30x    Self suboccipital release        Seated thoracic extension 2x10 1/2 foam  20x over FR   2x10 1/2 foam   Open book 20x B/L  15x B/L  15x B/L  20x B/L   Standing T/S ext at wall arms extending        Self foam roll        Doorway stretch  5x10s Ther Activity        Pt education Differential disuse, muscle, nerve-related weakness Reviewed Updated POC   Reviewed   Postural education  Reviewed   Reviewed                                           Modalities        Cervical mechanical traction  10' static, 5-20# supine at 30°                Access Code: [email or web address]  URL: [email or web address]  Date: 03/01/2022  Prepared by: Aliza Branch    Exercises  Median Nerve Flossing - Tray - 3 x daily - 7 x weekly - 1 sets - 30 reps  Open book - 1x daily - 7x weekly - 1 set -15-20 reps

## 2022-04-07 ENCOUNTER — OFFICE VISIT (OUTPATIENT)
Dept: PHYSICAL THERAPY | Facility: CLINIC | Age: 47
End: 2022-04-07
Payer: COMMERCIAL

## 2022-04-07 DIAGNOSIS — S29.012A RHOMBOID MUSCLE STRAIN, INITIAL ENCOUNTER: Primary | ICD-10-CM

## 2022-04-07 PROCEDURE — 97110 THERAPEUTIC EXERCISES: CPT

## 2022-04-07 PROCEDURE — 97112 NEUROMUSCULAR REEDUCATION: CPT

## 2022-04-07 PROCEDURE — 97140 MANUAL THERAPY 1/> REGIONS: CPT

## 2022-04-07 NOTE — PROGRESS NOTES
Daily Note      Today's date: 2022  Patient name: Kranthi Pinto  : 1975   MRN: [id number]  Referring provider: Melani Shah DO  Dx:   Encounter Diagnosis     ICD-10-CM    1  Rhomboid muscle strain, initial encounter  S29 012A        Subjective: Pt reports that he continued with the new exercises he learned last session and states he feels the numbness in his right hand is improving  Pt states that presently, the numbness in the R pointer finger is improved, but the thumb numbness is still there  Objective: See treatment diary below    Assessment: Pt tolerated treatment well  Pt continues to respond well in the short term with neurodynamic exercises  Pt notes strongest provocation of R hand symptoms with median nerve self glide and manually with PT  Pt resumed with B/L ER, horizontal abduction, and doorway stretch without issues  Plan: Continue per plan of care  Progress treatment as tolerated            Precautions: Standard     6 7 8 9 10    Manuals 3/24/22 3/29/22 3/31/22 4/5/22 4/7/22   STM/MFR        Manual distraction, SOR Performed Performed Performed Retraction, traction wiggle 10x Performed   Joint mobs         Manual flexibility Median, ulnar nerve glides RUE  Median nerve glides RUE   Median, radial, ulnar nerve glides RUE   Neuro Re-Ed        Chin tucks 2x10 supine 2x10 supine  2x10 w/ forehead lift  HOLD    Rows 2x10 GTB 2x10 GTB 2x10 17 5#  2*10, 17 5# 2x10 17 5#    Shoulder extensions  2x10 GTB 2x10 14# 2x10 14# 2x10 14#   B/L ER  2x10 RTB 2x10 RTB Held 2x10 RTB   B/L horizontal abduction  2x10 RTB 2x10 RTB  2x10 RTB   cerv retraction with OP    10x 10x    cer retraction with extn with wiggle    3*10  3x10    Prone I, T, Y                                Ther Ex        Median nerve glide  30x  30x HEP 30x    Self suboccipital release        Seated thoracic extension 2x10 1/2 foam  20x over FR   2x10 1/2 foam   Open book 20x B/L  15x B/L  15x B/L  15x B/L   Standing T/S ext at wall arms extending        Self foam roll        Doorway stretch  5x10s    5x10s                            Ther Activity        Pt education Differential disuse, muscle, nerve-related weakness Reviewed Updated POC   Reviewed   Postural education  Reviewed   Reviewed                                           Modalities        Cervical mechanical traction  10' static, 5-20# supine at 30°                Access Code: [email or web address]  URL: [email or web address]  Date: 03/01/2022  Prepared by: Wil Merchant    Exercises  Median Nerve Flossing - Tray - 3 x daily - 7 x weekly - 1 sets - 30 reps  Open book - 1x daily - 7x weekly - 1 set -15-20 reps

## 2022-04-12 ENCOUNTER — OFFICE VISIT (OUTPATIENT)
Dept: PHYSICAL THERAPY | Facility: CLINIC | Age: 47
End: 2022-04-12
Payer: COMMERCIAL

## 2022-04-12 DIAGNOSIS — S29.012A RHOMBOID MUSCLE STRAIN, INITIAL ENCOUNTER: Primary | ICD-10-CM

## 2022-04-12 PROCEDURE — 97110 THERAPEUTIC EXERCISES: CPT

## 2022-04-12 PROCEDURE — 97140 MANUAL THERAPY 1/> REGIONS: CPT

## 2022-04-12 PROCEDURE — 97112 NEUROMUSCULAR REEDUCATION: CPT

## 2022-04-12 NOTE — PROGRESS NOTES
Daily Note      Today's date: 2022  Patient name: Carolyn Guzman  : 1975  MRN: [id number]  Referring provider: Sanjana Wood DO  Dx:   Encounter Diagnosis     ICD-10-CM    1  Rhomboid muscle strain, initial encounter  S29 012A        Subjective: Pt reports that he continues to have pain in the right upper trap area  Pt notes that he still has weakness in the right hand and arm  Pt states that he is not experiencing as much pain in the right shoulder blade as before, but his main concern is his strength  Objective: See treatment diary below    Assessment: Pt tolerated treatment well  Pt demonstrates good carryover of self-nerve glides for radial and median nerves  Reviewed pt's POC over the next 2 weeks and upcoming orthopedic follow up  Pt requires minimal verbal cuing during corrective exercise  Plan: Continue per plan of care  Progress treatment as tolerated            Precautions: Standard     11 7 8 9 10    Manuals 4/12/22 3/29/22 3/31/22 4/5/22 4/7/22   STM/MFR        Manual distraction, SOR Performed Performed Performed Retraction, traction wiggle 10x Performed   Joint mobs         Manual flexibility Median, ulnar nerve glides RUE  Median nerve glides RUE   Median, radial, ulnar nerve glides RUE   Neuro Re-Ed        Chin tucks  2x10 supine  2x10 w/ forehead lift  HOLD    Rows 2x10 17 5#  2x10 GTB 2x10 17 5#  2*10, 17 5# 2x10 17 5#    Shoulder extensions 2x10 14# 2x10 GTB 2x10 14# 2x10 14# 2x10 14#   B/L ER  2x10 RTB 2x10 RTB Held 2x10 RTB   B/L horizontal abduction  2x10 RTB 2x10 RTB  2x10 RTB   cerv retraction with OP 2x10    10x 10x    cer retraction with extn with wiggle 2x10   3*10  3x10    Prone I, T, Y                                Ther Ex        Median nerve glide 30x median and radial 30x  30x HEP 30x    Self suboccipital release        Seated thoracic extension 2x10 FR  20x over FR   2x10 1/2 foam   Open book  15x B/L  15x B/L  15x B/L   Standing T/S ext at wall arms extending        Self foam roll        Doorway stretch 5x10s  5x10s    5x10s                            Ther Activity        Pt education Reviewed POC Reviewed Updated POC   Reviewed   Postural education Reviewed Reviewed   Reviewed                                           Modalities        Cervical mechanical traction  10' static, 5-20# supine at 30°                Access Code: [email or web address]  URL: [email or web address]  Date: 03/01/2022  Prepared by: Geraldine Solano    Exercises  Median Nerve Flossing - Tray - 3 x daily - 7 x weekly - 1 sets - 30 reps  Open book - 1x daily - 7x weekly - 1 set -15-20 reps

## 2022-04-14 ENCOUNTER — APPOINTMENT (OUTPATIENT)
Dept: PHYSICAL THERAPY | Facility: CLINIC | Age: 47
End: 2022-04-14
Payer: COMMERCIAL

## 2022-04-19 ENCOUNTER — OFFICE VISIT (OUTPATIENT)
Dept: OBGYN CLINIC | Facility: CLINIC | Age: 47
End: 2022-04-19
Payer: COMMERCIAL

## 2022-04-19 ENCOUNTER — OFFICE VISIT (OUTPATIENT)
Dept: PHYSICAL THERAPY | Facility: CLINIC | Age: 47
End: 2022-04-19
Payer: COMMERCIAL

## 2022-04-19 VITALS
WEIGHT: 179 LBS | TEMPERATURE: 97.3 F | BODY MASS INDEX: 27.62 KG/M2 | SYSTOLIC BLOOD PRESSURE: 153 MMHG | HEART RATE: 62 BPM | RESPIRATION RATE: 19 BRPM | DIASTOLIC BLOOD PRESSURE: 87 MMHG

## 2022-04-19 DIAGNOSIS — S29.012A RHOMBOID MUSCLE STRAIN, INITIAL ENCOUNTER: Primary | ICD-10-CM

## 2022-04-19 DIAGNOSIS — M54.12 RADICULOPATHY, CERVICAL REGION: Primary | ICD-10-CM

## 2022-04-19 DIAGNOSIS — M47.22 OSTEOARTHRITIS OF SPINE WITH RADICULOPATHY, CERVICAL REGION: ICD-10-CM

## 2022-04-19 PROCEDURE — 97140 MANUAL THERAPY 1/> REGIONS: CPT

## 2022-04-19 PROCEDURE — 97110 THERAPEUTIC EXERCISES: CPT

## 2022-04-19 PROCEDURE — 1036F TOBACCO NON-USER: CPT | Performed by: ORTHOPAEDIC SURGERY

## 2022-04-19 PROCEDURE — 99214 OFFICE O/P EST MOD 30 MIN: CPT | Performed by: ORTHOPAEDIC SURGERY

## 2022-04-19 PROCEDURE — 97112 NEUROMUSCULAR REEDUCATION: CPT

## 2022-04-19 NOTE — PROGRESS NOTES
Daily Note     Today's date: 2022  Patient name: Edgardo Flor  : 1975  MRN: [id number]  Referring provider: Chanelle Desai DO  Dx:   Encounter Diagnosis     ICD-10-CM    1  Rhomboid muscle strain, initial encounter  S29 012A                   Subjective: Patient reports no radicular sx at this time, reports some tricep soreness only  Objective: See treatment diary below      Assessment: Patient reports no change in radicular sx during or post tx  Plan: Continue per plan of care        Precautions: Standard     11 12 8 9 10    Manuals 4/12/22 4/19/22 3/31/22 4/5/22 4/7/22   STM/MFR        Manual distraction, SOR Performed Performed  Performed Retraction, traction wiggle 10x Performed   Joint mobs         Manual flexibility Median, ulnar nerve glides RUE Median, ulnar nerve glides RUE Median nerve glides RUE   Median, radial, ulnar nerve glides RUE   Neuro Re-Ed        Chin tucks   2x10 w/ forehead lift  HOLD    Rows 2x10 17 5#  2x10 17 5# 2x10 17 5#  2*10, 17 5# 2x10 17 5#    Shoulder extensions 2x10 14# 2x10 14# 2x10 14# 2x10 14# 2x10 14#   B/L ER   2x10 RTB Held 2x10 RTB   B/L horizontal abduction   2x10 RTB  2x10 RTB   cerv retraction with OP 2x10  2x10  10x 10x    cer retraction with extn with wiggle 2x10 2x10  3*10  3x10    Prone I, T, Y                                Ther Ex        Median nerve glide 30x median and radial 30x median and radial 30x HEP 30x    Self suboccipital release        Seated thoracic extension 2x10 FR 2x10 FR 20x over FR   2x10 1/2 foam   Open book   15x B/L  15x B/L   Standing T/S ext at wall arms extending        Self foam roll        Doorway stretch 5x10s  5x10s   5x10s                            Ther Activity        Pt education Reviewed POC  Updated POC   Reviewed   Postural education Reviewed    Reviewed                                           Modalities        Cervical mechanical traction                  Access Code: [email or web address]  URL: [email or web address]  Date: 03/01/2022  Prepared by: Geraldine Solano    Exercises  Median Nerve Flossing - Tray - 3 x daily - 7 x weekly - 1 sets - 30 reps  Open book - 1x daily - 7x weekly - 1 set -15-20 reps

## 2022-04-19 NOTE — PROGRESS NOTES
Assessment/Plan:  1  Radiculopathy, cervical region  MRI cervical spine wo contrast   2  Osteoarthritis of spine with radiculopathy, cervical region  MRI cervical spine wo contrast       Scribe Attestation    I,:  Geraldine Borges am acting as a scribe while in the presence of the attending physician :       I,:  Trina Puri, DO personally performed the services described in this documentation    as scribed in my presence :           Eden Raines upon examination and review of the physical therapy notes as well as review of the CT scan of cervical spine does demonstrate signs and symptoms that are concerning for cervical radiculopathy resulting in his diffuse right upper extremity weakness  On exam he does demonstrate mild weakness when compared to the contralateral side  However, I do have concern that he is experiencing weakness during his exercise routines  Review the CT scan does demonstrate degenerative changes most significant at C6-C7 with a posterior disc bulge  Due to the failure of regaining his strength with the right upper extremity with the assistance of physical therapy and the evidence of arthritic change of the cervical spine and disc herniation I would like to order an MRI to better visualize the disc herniation that is likely contributing to his weakness  Renate Bangura verbalize understanding and was amenable to the treatment plan presented to him today and had no further questions  I will see him back once the MRI of his cervical spine is completed  Subjective:   Emily Vo is a 56 y o  male who presents to the office today for follow-up evaluation of his periscapular and cervical pain and right upper extremity weakness  He states that his cervical pain and shoulder pain has greatly improved  However, has concern for persistent weakness into his right upper extremity  He states that he does feel weakness diffusely about the right upper extremity during exercise routines    Particularly with bench press and pull-ups  He states that intermittently he will experience paresthesias into the wrist and hand  However, notes that overall his sensation is intact  He does believe physical therapy has been beneficial in regards to overall quality of life  However, notes that he is still unable to partake in full participation in his exercise routine  Review of Systems   Constitutional: Negative for chills, fever and unexpected weight change  HENT: Negative for hearing loss, nosebleeds and sore throat  Eyes: Negative for pain, redness and visual disturbance  Respiratory: Negative for cough, shortness of breath and wheezing  Cardiovascular: Negative for chest pain, palpitations and leg swelling  Gastrointestinal: Negative for abdominal pain, nausea and vomiting  Endocrine: Negative for polyphagia and polyuria  Genitourinary: Negative for dysuria and hematuria  Musculoskeletal: Positive for arthralgias and myalgias  See HPI   Skin: Negative for rash and wound  Neurological: Negative for dizziness, numbness and headaches  Psychiatric/Behavioral: Negative for decreased concentration and suicidal ideas  The patient is not nervous/anxious            Past Medical History:   Diagnosis Date    Corneal abrasion     Resolved 8/23/2016     Difficult or painful urination     Resolved 8/23/2016     Hypertension     IFG (impaired fasting glucose)     Resolved 9/18/2017     Prediabetes     Resolved 9/18/2017     Vertigo        Past Surgical History:   Procedure Laterality Date    APPENDECTOMY      HERNIA REPAIR      KNEE SURGERY Right 2009    Lateral release     [facility] LAP,APPENDECTOMY N/A 7/30/2016    Procedure: APPENDECTOMY LAPAROSCOPIC;  Surgeon: Cherrie Johnson MD;  Location: [facility];  Service: General    SHOULDER SURGERY Right 2006    SLAP       Family History   Problem Relation Age of Onset    Coronary artery disease Father     Prostate cancer Father     Hypertension Family     Lymphoma Family     Skin cancer Family     No Known Problems Mother        Social History     Occupational History    Not on file   Tobacco Use    Smoking status: Never Smoker    Smokeless tobacco: Never Used   Vaping Use    Vaping Use: Never used   Substance and Sexual Activity    Alcohol use: Yes     Comment: rare    Drug use: No    Sexual activity: Not on file         Current Outpatient Medications:     cholecalciferol (VITAMIN D3) 1,000 units tablet, Take 1 tablet by mouth daily, Disp: , Rfl:     fluticasone (FLONASE) 50 mcg/act nasal spray, 2 sprays into each nostril daily, Disp: 3 Bottle, Rfl: 3    loratadine (Claritin) 10 mg tablet, Take 10 mg by mouth daily, Disp: , Rfl:     naproxen (Naprosyn) 500 mg tablet, Take 1 tablet (500 mg total) by mouth 2 (two) times a day with meals, Disp: 10 tablet, Rfl: 0    olmesartan (BENICAR) 20 mg tablet, Take 1 tablet by mouth once daily, Disp: 90 tablet, Rfl: 0    Omega-3 Fatty Acids (FISH OIL) 645 MG CAPS, Take 1 capsule by mouth daily, Disp: , Rfl:     tadalafil (CIALIS) 5 MG tablet, TAKE 1 TABLET BY MOUTH ONCE DAILY AS NEEDED FOR ERECTILE DYSFUNCTION, Disp: , Rfl:     cyclobenzaprine (FLEXERIL) 10 mg tablet, Take 1 tablet (10 mg total) by mouth daily at bedtime for 10 days, Disp: 10 tablet, Rfl: 0    lidocaine (LIDODERM) 5 %, Apply 1 patch topically daily for 2 days Remove & Discard patch within 12 hours or as directed by MD, Disp: 2 patch, Rfl: 0    meloxicam (Mobic) 15 mg tablet, Take 1 tablet (15 mg total) by mouth daily for 7 days, Disp: 7 tablet, Rfl: 0    Allergies   Allergen Reactions    Bee Venom     Flomax [Tamsulosin Hcl] Other (See Comments)     hypotension    Lisinopril     Pollen Extract     Tamsulosin Other (See Comments)     hypotension  hypotension       Objective:  Vitals:    04/19/22 1105   BP: 153/87   Pulse: 62   Resp: 19   Temp: (!) 97 3 °F (36 3 °C)       Right Shoulder Exam     Range of Motion   Active abduction: 170 Passive abduction: 160   External rotation: 80     Muscle Strength   Right shoulder normal muscle strength: Hand instrinsics: 5/5  Abduction: 5/5   Internal rotation: 5/5   External rotation: 5/5   Supraspinatus: 5/5   Subscapularis: 5/5   Biceps: 5/5     Other   Sensation: normal  Pulse: present    Comments:  Spurling's maneuver:  Negative  Cervical flexion:  80  Cervical extension:  50  Cervical lateral flexion right: 45  Cervical lateral flexion left:40  Cervical rotation left: 45  Cervical rotation right: 45      Left Shoulder Exam     Range of Motion   Active abduction: 170   Passive abduction: 160   External rotation: 80     Muscle Strength   Left shoulder normal muscle strength: hand intrinsics  Abduction: 5/5   Internal rotation: 5/5   External rotation: 5/5   Supraspinatus: 5/5   Subscapularis: 5/5   Biceps: 5/5     Other   Sensation: normal  Pulse: present             Physical Exam  Vitals reviewed  HENT:      Head: Normocephalic and atraumatic  Eyes:      General:         Right eye: No discharge  Left eye: No discharge  Conjunctiva/sclera: Conjunctivae normal       Pupils: Pupils are equal, round, and reactive to light  Cardiovascular:      Rate and Rhythm: Normal rate  Pulmonary:      Effort: Pulmonary effort is normal  No respiratory distress  Musculoskeletal:      Cervical back: Normal range of motion and neck supple  Skin:     General: Skin is warm and dry  Neurological:      Mental Status: He is alert and oriented to person, place, and time  Psychiatric:         Mood and Affect: Mood normal          Behavior: Behavior normal          I have personally reviewed pertinent films in PACS and my interpretation is as follows:    CT scan of the cervical spine obtained on 2/24/2022 demonstrates degenerative changes of C6-C7 with a small posterior disc bulge with moderate left foraminal narrowing

## 2022-04-21 ENCOUNTER — OFFICE VISIT (OUTPATIENT)
Dept: PHYSICAL THERAPY | Facility: CLINIC | Age: 47
End: 2022-04-21
Payer: COMMERCIAL

## 2022-04-21 DIAGNOSIS — S29.012A RHOMBOID MUSCLE STRAIN, INITIAL ENCOUNTER: Primary | ICD-10-CM

## 2022-04-21 PROCEDURE — 97112 NEUROMUSCULAR REEDUCATION: CPT

## 2022-04-21 PROCEDURE — 97110 THERAPEUTIC EXERCISES: CPT

## 2022-04-21 NOTE — PROGRESS NOTES
Daily Note      Today's date: 2022  Patient name: Kesha Figueroa  : 1975  MRN: [id number]  Referring provider: Nabeel Brand DO  Dx:   Encounter Diagnosis     ICD-10-CM    1  Rhomboid muscle strain, initial encounter  S29 012A        Subjective: Pt reports that overall, he feels good  Pt states that he definitely does not have any tingling anymore, but is still concerned about his weakness, especially in the gym  Pt states that he followed up with orthopedics and will be having an MRI on   Pt agrees that with exercise and symptom management, he is approaching functional independence  Objective: See treatment diary below    Assessment: Pt tolerated treatment well  Pt introduced to traction, retraction, and extension manually with PT  Pt noted no immediate change in symptoms with this  Pt demonstrates excellent carryover of exercises, including the purpose and goal of these exercises  The goal of final session next visit is to ensure independence with all exercises without cuing necessary  Plan: Continue per plan of care  Potential discharge next visit          Precautions: Standard     11 12 13     Manuals 22     STM/MFR        Manual distraction, SOR Performed Performed  Traction, retraction, extension     Joint mobs         Manual flexibility Median, ulnar nerve glides RUE Median, ulnar nerve glides RUE      Neuro Re-Ed        Chin tucks        Rows 2x10 17 5#  2x10 17 5# 2x10 17 5#      Shoulder extensions 2x10 14# 2x10 14# 2x10 14#     B/L ER   2x10 RTB     B/L horizontal abduction   2x10 RTB     cerv retraction with OP 2x10  2x10 2x10     cer retraction with extn with wiggle 2x10 2x10 2x10     Prone I, T, Y                                Ther Ex        Median nerve glide 30x median and radial 30x median and radial 30x     Self suboccipital release        Seated thoracic extension 2x10 FR 2x10 FR 20x over FR      Open book   15x B/L     Standing T/S ext at wall arms extending        Self foam roll        Doorway stretch 5x10s  5x10s 5x10s                              Ther Activity        Pt education Reviewed POC  D/C plan     Postural education Reviewed  Reviewed                                             Modalities        Cervical mechanical traction                  Access Code: [email or web address]  URL: [email or web address]  Date: 03/01/2022  Prepared by: Aliza Branch    Exercises  Median Nerve Flossing - Tray - 3 x daily - 7 x weekly - 1 sets - 30 reps  Open book - 1x daily - 7x weekly - 1 set -15-20 reps

## 2022-04-25 ENCOUNTER — TELEPHONE (OUTPATIENT)
Dept: OBGYN CLINIC | Facility: CLINIC | Age: 47
End: 2022-04-25

## 2022-04-25 NOTE — TELEPHONE ENCOUNTER
Based on [facility] [facility] Guidelines Section(s): SP 3 1 Neck (Cervical Spine) Pain without and with Neurological Features (Including Stenosis) and Preface to the Imaging Guidelines, section Preface-3 Clinical Information, we cannot approve this request      Your records show that you have weakness when trying to move your arms and/or hand(s)  The request cannot be approved because:     A study similar to the one requested has already been approved for you  A separate approval letter has been sent to you  We need results of the prior approved study that show the need for repeat imaging or your doctor must confirm that the prior approved study was not performed

## 2022-04-26 ENCOUNTER — OFFICE VISIT (OUTPATIENT)
Dept: PHYSICAL THERAPY | Facility: CLINIC | Age: 47
End: 2022-04-26
Payer: COMMERCIAL

## 2022-04-26 ENCOUNTER — TELEPHONE (OUTPATIENT)
Dept: OBGYN CLINIC | Facility: CLINIC | Age: 47
End: 2022-04-26

## 2022-04-26 DIAGNOSIS — S29.012A RHOMBOID MUSCLE STRAIN, INITIAL ENCOUNTER: Primary | ICD-10-CM

## 2022-04-26 PROCEDURE — 97110 THERAPEUTIC EXERCISES: CPT

## 2022-04-26 PROCEDURE — 97112 NEUROMUSCULAR REEDUCATION: CPT

## 2022-04-26 NOTE — TELEPHONE ENCOUNTER
MRI was denied  They are stating that with the previous CT study, we should be able to treat based off those results  P2P?

## 2022-04-26 NOTE — TELEPHONE ENCOUNTER
Peer to peer scheduled for Thursday, 4/28 @ 1 pm     Dr Hernandez Huerta will contact our office to speak with you       My number is prime

## 2022-04-26 NOTE — PROGRESS NOTES
PT Discharge      Today's date: 2022  Patient name: Zhang Hooper  : 1975  MRN: [id number]  Referring provider: Tita Ramachandran DO  Dx:   Encounter Diagnosis     ICD-10-CM    1  Rhomboid muscle strain, initial encounter  S29 012A        Subjective: Pt reports that he feels prepared to be discharged with HEP  Pt states that his symptoms are about the same over last several visits  Pt states that pain-wise, he feels really good  Pt states he also did not expect he could get rid of the right arm pain  Pt states that he is still frustrated with himself that his right arm strength (especially in the gym) has not returned yet  Objective: See treatment diary below    Assessment:   Zhang Hooper has been seen in skilled outpatient PT for 14 visits for the referring diagnosis of rhomboid muscle strain  Patient has received therapeutic exercises, neuromuscular re-education, therapeutic activities, and manual therapy  Patient presents with the following improvements noted: decreased pain, abolishment of RUE radicular symptoms, improved RUE strength, improved cervical AROM, improved postural awareness, and improved activity tolerance  Patient continues to present with the following impairments: limited right periscapular, arm, wrist, and hand strength  Due to these impairments, patient continues to have difficulty performing the following: prior exercise regimen in the gym  Patient no longer has difficulty performing ADLs  Patient has been educated in comprehensive HEP and has agreed to be discharged from outpatient PT due to maximum rehab potential met for this setting at this time  Plan: Discharge from PT to comprehensive HEP          Precautions: Standard     11 12 13 14    Manuals 22    STM/MFR        Manual distraction, SOR Performed Performed  Traction, retraction, extension     Joint mobs         Manual flexibility Median, ulnar nerve glides RUE Median, ulnar nerve glides RUE      Neuro Re-Ed        Chin tucks        Rows 2x10 17 5#  2x10 17 5# 2x10 17 5#  2x10 17 5#    Shoulder extensions 2x10 14# 2x10 14# 2x10 14# 2x10 14#    B/L ER   2x10 RTB 2x10 RTB    B/L horizontal abduction   2x10 RTB 2x10 RTB    cerv retraction with OP 2x10  2x10 2x10 2x10     cer retraction with extn with wiggle 2x10 2x10 2x10 10x                             Ther Ex        Median nerve glide 30x median and radial 30x median and radial 30x 30x median    Self suboccipital release        Seated thoracic extension 2x10 FR 2x10 FR 20x over FR      Open book   15x B/L 15x B/L     Standing T/S ext at wall arms extending        Self foam roll        Doorway stretch 5x10s  5x10s 5x10s  5x10s                            Ther Activity        Pt education Reviewed POC  D/C plan Comprehensive HEP    Postural education Reviewed  Reviewed                                             Modalities        Cervical mechanical traction                  Access Code: [email or web address]  URL: [email or web address]  Date: 04/26/2022  Prepared by: Zurdo Santos    Exercises  Cervical Retraction with Overpressure - 1 x daily - 5 x weekly - 2 sets - 10 reps - 3 hold  Seated Cervical Retraction and Extension - 1 x daily - 5 x weekly - 1 sets - 10 reps - 3 hold  Sidelying Thoracic Rotation with Open Book - 1 x daily - 5 x weekly - 1 sets - 15 reps - 3 hold  Median Nerve Flossing - 1 x daily - 5 x weekly - 2 sets - 10 reps - 3 hold  Doorway Pec Stretch at 60 Elevation - 1 x daily - 5 x weekly - 1 sets - 5 reps - 10 hold  Standing Row with Anchored Resistance - 1 x daily - 2-3 x weekly - 2-3 sets - 10 reps - 3 hold  Shoulder Extension with Resistance - 1 x daily - 2-3 x weekly - 2-3 sets - 10 reps - 3 hold  Shoulder External Rotation and Scapular Retraction with Resistance - 1 x daily - 2-3 x weekly - 2 sets - 10 reps - 3 hold  Standing Shoulder Horizontal Abduction with Resistance - 1 x daily - 2-3 x weekly - 2 sets - 10 reps - 3 hold

## 2022-04-27 ENCOUNTER — TELEPHONE (OUTPATIENT)
Dept: OBGYN CLINIC | Facility: CLINIC | Age: 47
End: 2022-04-27

## 2022-04-27 NOTE — TELEPHONE ENCOUNTER
Patient called regarding his MRI on Sunday 5/1  He received a letter in the mail saying it was denied because there was a similar one approved  He doesn't understand why it was denied  And he isn't sure why it was to do and would like to know if there is anything you can do to help

## 2022-04-27 NOTE — TELEPHONE ENCOUNTER
I have left v/m letting her know that Dr Joselo Tapia has the phone call tomorrow and we will be in touch with outcome of that call

## 2022-05-01 ENCOUNTER — HOSPITAL ENCOUNTER (OUTPATIENT)
Dept: RADIOLOGY | Facility: HOSPITAL | Age: 47
Discharge: HOME | End: 2022-05-01
Attending: ORTHOPAEDIC SURGERY
Payer: COMMERCIAL

## 2022-05-01 DIAGNOSIS — M54.12 RADICULOPATHY, CERVICAL REGION: ICD-10-CM

## 2022-05-01 DIAGNOSIS — M47.22 OSTEOARTHRITIS OF SPINE WITH RADICULOPATHY, CERVICAL REGION: ICD-10-CM

## 2022-05-01 PROCEDURE — G1004 CDSM NDSC: HCPCS

## 2022-05-01 PROCEDURE — 72141 MRI NECK SPINE W/O DYE: CPT

## 2022-05-10 ENCOUNTER — APPOINTMENT (OUTPATIENT)
Dept: RADIOLOGY | Facility: CLINIC | Age: 47
End: 2022-05-10
Payer: COMMERCIAL

## 2022-05-10 ENCOUNTER — OFFICE VISIT (OUTPATIENT)
Dept: OBGYN CLINIC | Facility: CLINIC | Age: 47
End: 2022-05-10
Payer: COMMERCIAL

## 2022-05-10 VITALS
WEIGHT: 179 LBS | HEIGHT: 68 IN | DIASTOLIC BLOOD PRESSURE: 80 MMHG | HEART RATE: 88 BPM | BODY MASS INDEX: 27.13 KG/M2 | SYSTOLIC BLOOD PRESSURE: 145 MMHG

## 2022-05-10 DIAGNOSIS — M62.58 ATROPHY OF PECTORAL MUSCLE: Primary | ICD-10-CM

## 2022-05-10 DIAGNOSIS — S29.012A RHOMBOID MUSCLE STRAIN, INITIAL ENCOUNTER: ICD-10-CM

## 2022-05-10 DIAGNOSIS — M50.20 BULGE OF CERVICAL DISC WITHOUT MYELOPATHY: ICD-10-CM

## 2022-05-10 PROCEDURE — 99214 OFFICE O/P EST MOD 30 MIN: CPT | Performed by: ORTHOPAEDIC SURGERY

## 2022-05-10 PROCEDURE — 1036F TOBACCO NON-USER: CPT | Performed by: ORTHOPAEDIC SURGERY

## 2022-05-10 PROCEDURE — 73030 X-RAY EXAM OF SHOULDER: CPT

## 2022-05-10 PROCEDURE — 3008F BODY MASS INDEX DOCD: CPT | Performed by: ORTHOPAEDIC SURGERY

## 2022-05-10 NOTE — PROGRESS NOTES
Assessment/Plan:  1  Atrophy of pectoral muscle  MRI pectoralis major right wo contrast   2  Bulge of cervical disc without myelopathy         Anabelle Liao has a cervical disc bulge at C6-7 on his MRI this does not cause any significant nerve impingement  Does not seem to be giving him much pain  His newly witnessed pectoral atrophy is not quite clear what happened to him at this point  While it is possible that he had a pectoral rupture it did not present with significant bruising or swelling or pain several months ago  I informed him that this could be an abnormal nerve compression leading to atrophy of the pectoral muscle as well  His x-rays of the shoulder region are normal today  I recommended MRI of the pectoral muscle to rule out rupture before we workup neurologic causes of his atrophy  Patient verbalized understanding this plan and will follow up after the MRI is complete  Subjective:   Zhang Hooper is a 56 y o  male who presents to the office for follow-up for cervical neck pain  He had been experiencing right-sided cervical radiculopathy dating back to January of this year  He initially presented with periscapular shoulder pain and started physical therapy  When that pain did not significantly improve he did have more consistent symptoms of neck pain with radiating pain down the right arm  He eventually completed physical therapy on his neck and shoulder and had moderate improvement  At last office visit did demonstrate some weakness in the right upper extremity and we sent him for a cervical spine MRI  He returns today  He states today he has not been feeling any discomfort in his neck and no longer feels any paresthesias or weakness in his right hand  He did state though in the last 1 week he did notice significant atrophy in his right chest compared to his left  His wife noticed this when he has sure it off    He is not sure how long this has been present but he did not notice until 1 week ago  He does have a history of pectoral tear in the left side but states he never had a tear on the right  He denies feeling a pop or bruising in the past several months  He has noticed that is bench press has been weaker on the right side compared to the left  Review of Systems   Constitutional: Negative for chills, fever and unexpected weight change  HENT: Negative for hearing loss, nosebleeds and sore throat  Eyes: Negative for pain, redness and visual disturbance  Respiratory: Negative for cough, shortness of breath and wheezing  Cardiovascular: Negative for chest pain, palpitations and leg swelling  Gastrointestinal: Negative for abdominal pain, nausea and vomiting  Endocrine: Negative for polyphagia and polyuria  Genitourinary: Negative for dysuria and hematuria  Musculoskeletal:        See HPI   Skin: Negative for rash and wound  Neurological: Negative for dizziness, numbness and headaches  Psychiatric/Behavioral: Negative for decreased concentration and suicidal ideas  The patient is not nervous/anxious            Past Medical History:   Diagnosis Date    Corneal abrasion     Resolved 8/23/2016     Difficult or painful urination     Resolved 8/23/2016     Hypertension     IFG (impaired fasting glucose)     Resolved 9/18/2017     Prediabetes     Resolved 9/18/2017     Vertigo        Past Surgical History:   Procedure Laterality Date    APPENDECTOMY      HERNIA REPAIR      KNEE SURGERY Right 2009    Lateral release     NM LAP,APPENDECTOMY N/A 7/30/2016    Procedure: APPENDECTOMY LAPAROSCOPIC;  Surgeon: Geraldine Cain MD;  [facility];  Service: General    SHOULDER SURGERY Right 2006    SLAP       Family History   Problem Relation Age of Onset    Coronary artery disease Father     Prostate cancer Father     Hypertension Family     Lymphoma Family     Skin cancer Family     No Known Problems Mother        Social History     Occupational History    Not on file   Tobacco Use    Smoking status: Never Smoker    Smokeless tobacco: Never Used   Vaping Use    Vaping Use: Never used   Substance and Sexual Activity    Alcohol use: Yes     Comment: rare    Drug use: No    Sexual activity: Not on file         Current Outpatient Medications:     cholecalciferol (VITAMIN D3) 1,000 units tablet, Take 1 tablet by mouth daily, Disp: , Rfl:     fluticasone (FLONASE) 50 mcg/act nasal spray, 2 sprays into each nostril daily, Disp: 3 Bottle, Rfl: 3    loratadine (Claritin) 10 mg tablet, Take 10 mg by mouth daily, Disp: , Rfl:     naproxen (Naprosyn) 500 mg tablet, Take 1 tablet (500 mg total) by mouth 2 (two) times a day with meals, Disp: 10 tablet, Rfl: 0    olmesartan (BENICAR) 20 mg tablet, Take 1 tablet by mouth once daily, Disp: 90 tablet, Rfl: 0    Omega-3 Fatty Acids (FISH OIL) 645 MG CAPS, Take 1 capsule by mouth daily, Disp: , Rfl:     tadalafil (CIALIS) 5 MG tablet, TAKE 1 TABLET BY MOUTH ONCE DAILY AS NEEDED FOR ERECTILE DYSFUNCTION, Disp: , Rfl:     cyclobenzaprine (FLEXERIL) 10 mg tablet, Take 1 tablet (10 mg total) by mouth daily at bedtime for 10 days, Disp: 10 tablet, Rfl: 0    lidocaine (LIDODERM) 5 %, Apply 1 patch topically daily for 2 days Remove & Discard patch within 12 hours or as directed by MD, Disp: 2 patch, Rfl: 0    meloxicam (Mobic) 15 mg tablet, Take 1 tablet (15 mg total) by mouth daily for 7 days, Disp: 7 tablet, Rfl: 0    Allergies   Allergen Reactions    Bee Venom     Flomax [Tamsulosin Hcl] Other (See Comments)     hypotension    Lisinopril     Pollen Extract     Tamsulosin Other (See Comments)     hypotension  hypotension       Objective:  Vitals:    05/10/22 0957   BP: 145/80   Pulse: 88       Right Shoulder Exam     Tenderness   Right shoulder tenderness location: No tenderness to palpation over right shoulder and right pec  Comments:  Visible atrophy of superior right pectoral muscle compared to left    No discernible weakness appreciated  No visible ecchymosis            Physical Exam  Vitals and nursing note reviewed  Constitutional:       Appearance: He is well-developed  HENT:      Head: Normocephalic and atraumatic  Eyes:      General: No scleral icterus  Conjunctiva/sclera: Conjunctivae normal    Neck:      Comments: Negative Spurling's maneuver on the right  Cardiovascular:      Rate and Rhythm: Normal rate  Pulmonary:      Effort: Pulmonary effort is normal  No respiratory distress  Chest:      Chest wall: Deformity present  No swelling or tenderness  Comments: Atrophy of right sternal head of pectoralis major  Slight weakness with resisted right shoulder flexion at 90°  Musculoskeletal:      Cervical back: Normal range of motion and neck supple  No spinous process tenderness or muscular tenderness  Normal range of motion  Comments: As noted in HPI   Skin:     General: Skin is warm and dry  Neurological:      Mental Status: He is alert and oriented to person, place, and time     Psychiatric:         Behavior: Behavior normal          I have personally reviewed pertinent films in PACS and my interpretation is as follows:  MRI of the cervical spine demonstrates disc bulge at C6-7 without spinal cord compression and moderate foraminal stenosis

## 2022-05-20 ENCOUNTER — TELEPHONE (OUTPATIENT)
Dept: OBGYN CLINIC | Facility: HOSPITAL | Age: 47
End: 2022-05-20

## 2022-05-20 NOTE — TELEPHONE ENCOUNTER
Patient called stating that he received a letter from his insurance company stating that his MRI authorization is denied  Per referral in [facility] MRI referral states approved through peer to peer  Advised patient to verify through his insurance company  Patient will call and verify authorization

## 2022-05-26 ENCOUNTER — HOSPITAL ENCOUNTER (OUTPATIENT)
Dept: RADIOLOGY | Facility: HOSPITAL | Age: 47
Discharge: HOME | End: 2022-05-26
Attending: ORTHOPAEDIC SURGERY
Payer: COMMERCIAL

## 2022-05-26 DIAGNOSIS — M62.58 ATROPHY OF PECTORAL MUSCLE: ICD-10-CM

## 2022-05-26 PROCEDURE — G1004 CDSM NDSC: HCPCS

## 2022-05-26 PROCEDURE — 73218 MRI UPPER EXTREMITY W/O DYE: CPT

## 2022-06-02 ENCOUNTER — OFFICE VISIT (OUTPATIENT)
Dept: OBGYN CLINIC | Facility: CLINIC | Age: 47
End: 2022-06-02
Payer: COMMERCIAL

## 2022-06-02 VITALS
HEART RATE: 54 BPM | SYSTOLIC BLOOD PRESSURE: 134 MMHG | BODY MASS INDEX: 27.13 KG/M2 | HEIGHT: 68 IN | WEIGHT: 179 LBS | DIASTOLIC BLOOD PRESSURE: 87 MMHG

## 2022-06-02 DIAGNOSIS — M62.58 ATROPHY OF PECTORAL MUSCLE: Primary | ICD-10-CM

## 2022-06-02 PROCEDURE — 99213 OFFICE O/P EST LOW 20 MIN: CPT | Performed by: ORTHOPAEDIC SURGERY

## 2022-06-02 PROCEDURE — 1036F TOBACCO NON-USER: CPT | Performed by: ORTHOPAEDIC SURGERY

## 2022-06-02 PROCEDURE — 3008F BODY MASS INDEX DOCD: CPT | Performed by: ORTHOPAEDIC SURGERY

## 2022-06-02 NOTE — PROGRESS NOTES
Assessment/Plan:  1  Atrophy of pectoral muscle  EMG 1 Limb    Ambulatory Referral to Neurology    CBC and differential    Comprehensive metabolic panel    C-reactive protein       Gage Mays has continued atrophy of his right pectoral region and an MRI of the Pec and cervical spines which have not shown any clear abnormality  There was edema visualized on his sternal head of the right pack which could be evident in denervation injury or myositis  I did place an order for blood work today as well as an EMG and neurology evaluation  He verbalized understanding this plan and will follow up with Neurology going forward  Subjective:   Olimpia Panchal is a 56 y o  male who presents to the office for follow-up for a recent discovery of atrophy of the right pectoral region  His pain initially began in the cervical spine dating back to January and was treated conservatively  He did have some weakness in the right upper extremity and was sent for an MRI of the cervical spine  The symptoms did resolve with conservative measures and the MRI only showed mild degenerative changes without clear findings of radiculopathy  At last office visit he had a normal physical exam other than reported new onset of atrophy in the right pectoral region that he just noticed  At that time he states that he did not feel a pop over the chest but was continuing to have weakness in the gym with bench press or other exercises  He states that the atrophy continues to remain any states is much more obvious looking in the mirror his right chest seems to have a dip in that area  He denies any new injury  He has no neck pain or arm pain today  Review of Systems   Constitutional: Negative for chills, fever and unexpected weight change  HENT: Negative for hearing loss, nosebleeds and sore throat  Eyes: Negative for pain, redness and visual disturbance  Respiratory: Negative for cough, shortness of breath and wheezing  Cardiovascular: Negative for chest pain, palpitations and leg swelling  Gastrointestinal: Negative for abdominal pain, nausea and vomiting  Endocrine: Negative for polyphagia and polyuria  Genitourinary: Negative for dysuria and hematuria  Musculoskeletal:        See HPI   Skin: Negative for rash and wound  Neurological: Negative for dizziness, numbness and headaches  Psychiatric/Behavioral: Negative for decreased concentration and suicidal ideas  The patient is not nervous/anxious            Past Medical History:   Diagnosis Date    Corneal abrasion     Resolved 8/23/2016     Difficult or painful urination     Resolved 8/23/2016     Hypertension     IFG (impaired fasting glucose)     Resolved 9/18/2017     Prediabetes     Resolved 9/18/2017     Vertigo        Past Surgical History:   Procedure Laterality Date    APPENDECTOMY      HERNIA REPAIR      KNEE SURGERY Right 2009    Lateral release     HI LAP,APPENDECTOMY N/A 7/30/2016    Procedure: APPENDECTOMY LAPAROSCOPIC;  Surgeon: Pari Monterroso MD;  [facility];  Service: General    SHOULDER SURGERY Right 2006    SLAP       Family History   Problem Relation Age of Onset    Coronary artery disease Father     Prostate cancer Father     Hypertension Family     Lymphoma Family     Skin cancer Family     No Known Problems Mother        Social History     Occupational History    Not on file   Tobacco Use    Smoking status: Never Smoker    Smokeless tobacco: Never Used   Vaping Use    Vaping Use: Never used   Substance and Sexual Activity    Alcohol use: Yes     Comment: rare    Drug use: No    Sexual activity: Not on file         Current Outpatient Medications:     cholecalciferol (VITAMIN D3) 1,000 units tablet, Take 1 tablet by mouth daily, Disp: , Rfl:     fluticasone (FLONASE) 50 mcg/act nasal spray, 2 sprays into each nostril daily, Disp: 3 Bottle, Rfl: 3    loratadine (CLARITIN) 10 mg tablet, Take 10 mg by mouth daily, Disp: , Rfl:     naproxen (Naprosyn) 500 mg tablet, Take 1 tablet (500 mg total) by mouth 2 (two) times a day with meals, Disp: 10 tablet, Rfl: 0    olmesartan (BENICAR) 20 mg tablet, Take 1 tablet by mouth once daily, Disp: 90 tablet, Rfl: 0    Omega-3 Fatty Acids (FISH OIL) 645 MG CAPS, Take 1 capsule by mouth daily, Disp: , Rfl:     tadalafil (CIALIS) 5 MG tablet, TAKE 1 TABLET BY MOUTH ONCE DAILY AS NEEDED FOR ERECTILE DYSFUNCTION, Disp: , Rfl:     cyclobenzaprine (FLEXERIL) 10 mg tablet, Take 1 tablet (10 mg total) by mouth daily at bedtime for 10 days, Disp: 10 tablet, Rfl: 0    lidocaine (LIDODERM) 5 %, Apply 1 patch topically daily for 2 days Remove & Discard patch within 12 hours or as directed by MD, Disp: 2 patch, Rfl: 0    meloxicam (Mobic) 15 mg tablet, Take 1 tablet (15 mg total) by mouth daily for 7 days, Disp: 7 tablet, Rfl: 0    Allergies   Allergen Reactions    Bee Venom     Flomax [Tamsulosin Hcl] Other (See Comments)     hypotension    Lisinopril     Pollen Extract     Tamsulosin Other (See Comments)     hypotension  hypotension       Objective:  Vitals:    06/02/22 1507   BP: 134/87   Pulse: (!) 54       Right Shoulder Exam   Right shoulder exam is normal     Tenderness   The patient is experiencing no tenderness  Range of Motion   Active abduction: normal   Passive abduction: normal   Extension: normal   External rotation: normal   Forward flexion: normal   Internal rotation 0 degrees: normal     Tests   Valenzuela test: negative  Impingement: negative    Other   Erythema: absent  Sensation: normal  Pulse: present            Physical Exam  Vitals and nursing note reviewed  Constitutional:       Appearance: He is well-developed  HENT:      Head: Normocephalic and atraumatic  Eyes:      General: No scleral icterus       Conjunctiva/sclera: Conjunctivae normal    Neck:      Comments: Negative Spurling's maneuver on the right, normal sensation and strength bilateral upper extremities    Cardiovascular:      Rate and Rhythm: Normal rate  Pulmonary:      Effort: Pulmonary effort is normal  No respiratory distress  Chest:          Comments: Visible atrophy along sternal head of right pectoralis major compared to left side    See images below  Musculoskeletal:      Cervical back: Normal range of motion and neck supple  No spinous process tenderness or muscular tenderness  Normal range of motion  Comments: As noted in HPI   Skin:     General: Skin is warm and dry  Neurological:      Mental Status: He is alert and oriented to person, place, and time  Psychiatric:         Behavior: Behavior normal          I have personally reviewed pertinent films in PACS and my interpretation is as follows:  MRI of the right pectoralis major without contrast demonstrates atrophy of the right pack compared to partially visualized left pectoral region  Edema within the sternal head of the pectoralis major muscle visible  No evidence of rupture

## 2022-06-06 ENCOUNTER — TELEPHONE (OUTPATIENT)
Dept: OBGYN CLINIC | Facility: HOSPITAL | Age: 47
End: 2022-06-06

## 2022-06-06 ENCOUNTER — TELEPHONE (OUTPATIENT)
Dept: NEUROLOGY | Facility: CLINIC | Age: 47
End: 2022-06-06

## 2022-06-06 NOTE — TELEPHONE ENCOUNTER
Patient called to schedule an appointment with Dr Elier Day  Checked with Barby Emery to to make sure patient can be seen for this diagnosis and she said yes  Offered the patient 11-10-22 with Dr Elier Day and he asked if he can be seen sooner then that elsewhere and io reached back out to Barby Emery to see if I can schedule him with Dr Charles or Dr Florentino Melo in [facility] and she stated yes  Offered him 8-11-22 with Dr Florentino Melo and added him to the wait list and he accepted  4 = No assist / stand by assistance

## 2022-07-20 DIAGNOSIS — I10 BENIGN ESSENTIAL HYPERTENSION: ICD-10-CM

## 2022-07-20 RX ORDER — OLMESARTAN MEDOXOMIL 20 MG/1
TABLET ORAL
Qty: 90 TABLET | Refills: 0 | Status: SHIPPED | OUTPATIENT
Start: 2022-07-20

## 2022-08-08 ENCOUNTER — TELEPHONE (OUTPATIENT)
Dept: NEUROLOGY | Facility: CLINIC | Age: 47
End: 2022-08-08

## 2022-08-08 NOTE — TELEPHONE ENCOUNTER
THE [facility] to confirm patient's 8/11/2022 @ 3 pm appointment with Dr Savanna Emerson at the [facility]  Call back number given [phone]

## 2022-08-11 ENCOUNTER — CONSULT (OUTPATIENT)
Dept: NEUROLOGY | Facility: CLINIC | Age: 47
End: 2022-08-11
Payer: COMMERCIAL

## 2022-08-11 VITALS
TEMPERATURE: 97.2 F | HEIGHT: 68 IN | SYSTOLIC BLOOD PRESSURE: 110 MMHG | WEIGHT: 168 LBS | BODY MASS INDEX: 25.46 KG/M2 | DIASTOLIC BLOOD PRESSURE: 88 MMHG | HEART RATE: 66 BPM

## 2022-08-11 DIAGNOSIS — M62.58 ATROPHY OF PECTORAL MUSCLE: ICD-10-CM

## 2022-08-11 DIAGNOSIS — G54.5 PARSONAGE-TURNER SYNDROME: Primary | ICD-10-CM

## 2022-08-11 PROBLEM — M60.9 MYOSITIS: Status: ACTIVE | Noted: 2022-08-11

## 2022-08-11 PROCEDURE — 99245 OFF/OP CONSLTJ NEW/EST HI 55: CPT | Performed by: PSYCHIATRY & NEUROLOGY

## 2022-08-11 NOTE — PROGRESS NOTES
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  55 y/o m w h/o Rt shoulder cartilage repair in 2007, HTN, pain in neck presents here as a new patient for evaluation for pectoral muscle atrophy/strength  To review, pt started having pain in his neck in Jan-Feb, radiating to Rt shoulder and arm, it was initially severe, associated with numbness, did PT pain/numbness got better in 1-2 months  In May noted atrophy/decreased bulk of pectoralis muscle, triceps in Rt compared to left  MRI C spine- C6-7 degenerative disc disease with left greater than right uncinate joint hypertrophic change and annular bulging  DDx- Suspect Parsonage mendoza  Low suspicion for radiculopathy given only C6-C7 degenerative changes  Will rule out neuropathy  Plan-  EMG Rt upper extremity  Continue PT   Discuss with Dr Wojciech Palafox   Return in 6 months          Diagnoses and all orders for this visit:    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    Atrophy of pectoral muscle  -     Ambulatory Referral to Neurology           Subjective:    55 y/o m w h/o Rt shoulder cartilage repair in 2007, HTN, pain in neck presents here as a new patient for evaluation for pectoral muscle atrophy/strength  He reports in Jan/Feb he started feeling pain in neck region, Pain was mostly constant, it would differ in intensity, Pain in neck region was radiating down in his right arm, hand  Also felt numbness  He was a  at that time, currently retired  He saw orthopedic and was told its related to strain rhomboid  He started doing exercise on his own and pain got worse  He then did PT and pain got better in 1-2 months  He also did CT spine, MRI C spine, C6-7 degenerative disc disease with left greater than right uncinate joint hypertrophic change and annular bulging  Moderate left foraminal narrowing     Had MRI pectoralis muscle- There is however mild muscle edema in the superior portion of the sternal head of the pectoralis major muscle best seen on series 5 images 9-11  There is no atrophy in this area  This finding could be due to an acute myositis or denervation injury    He goes to gym  He denies pain at this time  Numbness has also resolved  He noted in May 2022- right pectoralis mucle is not as bulky as left side and noted Rt triceps is also not as bulky  There was few instances in past where he had difficulty using fork from right hand  Reports overall he is improving  The following portions of the patient's history were reviewed and updated as appropriate: allergies, current medications, past family history, past medical history, past social history, past surgical history and problem list          Objective:    Blood pressure 110/88, pulse 66, temperature (!) 97 2 °F (36 2 °C), height 5' 8" (1 727 m), weight 76 2 kg (168 lb)  Physical Exam  Vitals reviewed  Constitutional:       Appearance: Normal appearance  HENT:      Head: Normocephalic  Mouth/Throat:      Mouth: Mucous membranes are moist       Pharynx: Oropharynx is clear  Eyes:      Extraocular Movements: Extraocular movements intact  Pupils: Pupils are equal, round, and reactive to light  Cardiovascular:      Rate and Rhythm: Normal rate  Pulses: Normal pulses  Pulmonary:      Effort: Pulmonary effort is normal    Abdominal:      Palpations: Abdomen is soft  Musculoskeletal:         General: Normal range of motion  Cervical back: Normal range of motion  Skin:     General: Skin is warm  Capillary Refill: Capillary refill takes less than 2 seconds  Neurological:      Mental Status: He is alert  Psychiatric:         Mood and Affect: Mood normal          Neurological Examination:     Mental Status: The patient was awake, alert, attentive, oriented to person, place, and time        Cranial Nerves:   I: smell Not tested   II: visual fields Full to confrontation   Pupils equal, round, reactive to light with normal accomodation  Fundus: benign fundus  III,IV,VI: extraocular muscles EOMI, no nystagmus   V: masseter and pterygoid strength full  Sensation in the V1 through V3 distributions intact to pinprick and light touch bilaterally  VII: Face is symmetric with no weakness noted  VIII: Audition intact to finger rub bilaterally  IX/X: Uvula midline  Soft palate elevation symmetric  XI: Trapezius and SCM strength 5/5 B/L  XII: Tongue midline with no atrophy or fasciculations with appropriate movement  Motor Examination:   No pronator drift  Bulk: Normal  No atrophy Tone: Normal  Fasciculations: None  Deltoid Biceps Triceps WE   WF   FF IO     Right        5         5          5         5      5      5   5        Left           5        5          5-          5      5     5   5                       IP        Quad   Ham     TA       Gastroc   Right      5            5          5         5                5  Left         5            5         5         5                5       Reflexes:                   Biceps Brachioradialis Triceps Patella Achilles Plantars   Right          2+            2+                  2+        2+       2+         Down   Left            2+             2+                 2+         2+       2+         Down     Clonus: None    Pathological Reflexes:  Hoffmans: negative  Babinsky: negative  Jaw Jerk: negative    Coordination: Patient able to perform normal finger-to-nose and heel to shin appropriately  Normal rapid alternating movements  Sensory: Normal sensation to light touch, pin prick and vibratory sensation throughout  Gait:normal stance and posture, normal stride length and arm swing, normal turn around  Romberg negative  ROS:    Review of Systems   Constitutional: Negative  Negative for appetite change and fever  HENT: Negative  Negative for hearing loss, tinnitus, trouble swallowing and voice change  Eyes: Negative  Negative for photophobia and pain  Respiratory: Negative  Negative for shortness of breath  Cardiovascular: Negative  Negative for palpitations  Gastrointestinal: Negative  Negative for nausea and vomiting  Endocrine: Negative  Negative for cold intolerance  Genitourinary: Negative  Negative for dysuria, frequency and urgency  Musculoskeletal: Positive for neck pain  Negative for myalgias  End of Jan beginning Feb started getting pain in back of neck, went to ortho DX'd w/herniated vertebrae   R side pectoral muscle atrophied Trying to EMG but no appts available    Skin: Negative  Negative for rash  Allergic/Immunologic: Negative  Neurological: Positive for weakness  Negative for dizziness, tremors, seizures, syncope, facial asymmetry, speech difficulty, light-headedness, numbness and headaches  Hematological: Negative  Does not bruise/bleed easily  Psychiatric/Behavioral: Negative  Negative for confusion, hallucinations and sleep disturbance  All other systems reviewed and are negative

## 2022-08-11 NOTE — ASSESSMENT & PLAN NOTE
55 y/o m w h/o Rt shoulder cartilage repair in 2007, HTN, pain in neck presents here as a new patient for evaluation for pectoral muscle atrophy/strength  To review, pt started having pain in his neck in Jan-Feb, radiating to Rt shoulder and arm, it was initially severe, associated with numbness, did PT pain/numbness got better in 1-2 months  In May noted atrophy/decreased bulk of pectoralis muscle, triceps in Rt compared to left  MRI C spine- C6-7 degenerative disc disease with left greater than right uncinate joint hypertrophic change and annular bulging  DDx- Suspect Parsonage mendoza  Low suspicion for radiculopathy given only C6-C7 degenerative changes  Will rule out neuropathy          Plan-  EMG Rt upper extremity  Continue PT   Discuss with Dr Jonathan Sanchez   Return in 6 months

## 2022-09-15 ENCOUNTER — HOSPITAL ENCOUNTER (OUTPATIENT)
Dept: NEUROLOGY | Facility: CLINIC | Age: 47
End: 2022-09-15
Payer: COMMERCIAL

## 2022-09-15 DIAGNOSIS — M62.58 ATROPHY OF PECTORAL MUSCLE: ICD-10-CM

## 2022-09-15 PROCEDURE — 95886 MUSC TEST DONE W/N TEST COMP: CPT | Performed by: PSYCHIATRY & NEUROLOGY

## 2022-09-15 PROCEDURE — 95910 NRV CNDJ TEST 7-8 STUDIES: CPT | Performed by: PSYCHIATRY & NEUROLOGY

## 2022-09-16 ENCOUNTER — TELEPHONE (OUTPATIENT)
Dept: OBGYN CLINIC | Facility: CLINIC | Age: 47
End: 2022-09-16

## 2022-09-16 NOTE — TELEPHONE ENCOUNTER
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

## 2022-09-19 NOTE — TELEPHONE ENCOUNTER
Spoke with patient and advised of below, and gave verbal understanding      he wanted to make you aware the pain is almost gone and that the muscle is starting to rebuild

## 2022-09-27 ENCOUNTER — OFFICE VISIT (OUTPATIENT)
Dept: INTERNAL MEDICINE CLINIC | Facility: CLINIC | Age: 47
End: 2022-09-27
Payer: COMMERCIAL

## 2022-09-27 VITALS
OXYGEN SATURATION: 96 % | BODY MASS INDEX: 25.13 KG/M2 | WEIGHT: 165.8 LBS | DIASTOLIC BLOOD PRESSURE: 86 MMHG | TEMPERATURE: 98 F | HEIGHT: 68 IN | HEART RATE: 96 BPM | SYSTOLIC BLOOD PRESSURE: 130 MMHG | RESPIRATION RATE: 16 BRPM

## 2022-09-27 DIAGNOSIS — K92.1 BLOOD IN STOOL: ICD-10-CM

## 2022-09-27 DIAGNOSIS — I10 BENIGN ESSENTIAL HYPERTENSION: Primary | ICD-10-CM

## 2022-09-27 DIAGNOSIS — K64.4 EXTERNAL HEMORRHOIDS: ICD-10-CM

## 2022-09-27 DIAGNOSIS — Z13.6 ENCOUNTER FOR LIPID SCREENING FOR CARDIOVASCULAR DISEASE: ICD-10-CM

## 2022-09-27 DIAGNOSIS — R73.03 PREDIABETES: ICD-10-CM

## 2022-09-27 DIAGNOSIS — Z13.220 ENCOUNTER FOR LIPID SCREENING FOR CARDIOVASCULAR DISEASE: ICD-10-CM

## 2022-09-27 DIAGNOSIS — Z11.59 NEED FOR HEPATITIS C SCREENING TEST: ICD-10-CM

## 2022-09-27 PROBLEM — Z00.00 HEALTHCARE MAINTENANCE: Status: RESOLVED | Noted: 2018-11-30 | Resolved: 2022-09-27

## 2022-09-27 PROBLEM — R07.0 CHRONIC THROAT PAIN: Status: RESOLVED | Noted: 2021-08-16 | Resolved: 2022-09-27

## 2022-09-27 PROBLEM — G89.29 CHRONIC THROAT PAIN: Status: RESOLVED | Noted: 2021-08-16 | Resolved: 2022-09-27

## 2022-09-27 PROBLEM — Z13.1 SCREENING FOR DIABETES MELLITUS (DM): Status: RESOLVED | Noted: 2018-11-30 | Resolved: 2022-09-27

## 2022-09-27 PROBLEM — Z23 IMMUNIZATION DUE: Status: RESOLVED | Noted: 2018-11-30 | Resolved: 2022-09-27

## 2022-09-27 PROCEDURE — 99204 OFFICE O/P NEW MOD 45 MIN: CPT | Performed by: INTERNAL MEDICINE

## 2022-09-27 NOTE — PATIENT INSTRUCTIONS
Fasting blood work  [facility] specialist appointment  Drink plenty of fluids/add fiber to the diet  See information below    Hemorrhoids   AMBULATORY CARE:   Hemorrhoids  are swollen blood vessels inside your rectum (internal hemorrhoids) or on your anus (external hemorrhoids)  Sometimes a hemorrhoid may prolapse  This means it extends out of your anus  Common symptoms include the following:   Pain or itching around your anus or inside your rectum    Swelling or bumps around your anus    Bright red blood in your bowel movement, on the toilet paper, or in the toilet bowl    Tissue bulging out of your anus (prolapsed hemorrhoids)    Incontinence (poor control over urine or bowel movements)    Seek care immediately if:   You have severe pain in your rectum or around your anus  You have severe pain in your abdomen and you are vomiting  You have bleeding from your anus that soaks through your underwear  Contact your healthcare provider if:   You have frequent and painful bowel movements  Your hemorrhoid looks or feels more swollen than usual      You do not have a bowel movement for 2 days or more  You see or feel tissue coming through your anus  You have questions or concerns about your condition or care  Treatment for hemorrhoids  may include medicines to decrease pain, swelling, and itching  The medicine may be a pill, pad, cream, or ointment  Medicine may also be given to soften your bowel movement  Surgery and other procedures may be needed to shrink or remove your hemorrhoids  Manage your symptoms:   Apply ice on your anus for 15 to 20 minutes every hour or as directed  Use an ice pack, or put crushed ice in a plastic bag  Cover it with a towel before you apply it to your anus  Ice helps prevent tissue damage and decreases swelling and pain  Take a sitz bath  Fill a bathtub with 4 to 6 inches of warm water  You may also use a sitz bath pan that fits inside a toilet bowl   Sit in the sitz bath for 15 minutes  Do this 3 times a day, and after each bowel movement  The warm water can help decrease pain and swelling  Keep your anal area clean  Gently wash the area with warm water daily  Soap may irritate the area  After a bowel movement, wipe with moist towelettes or wet toilet paper  Dry toilet paper can irritate the area  Prevent hemorrhoids:   Do not strain to have a bowel movement  Do not sit on the toilet too long  These actions can increase pressure on the tissues in your rectum and anus  Drink plenty of liquids  Liquids can help prevent constipation  Ask how much liquid to drink each day and which liquids are best for you  Eat a variety of high-fiber foods  Examples include fruits, vegetables, and whole grains  Ask your healthcare provider how much fiber you need each day  You may need to take a fiber supplement  Exercise as directed  Exercise, such as walking, may make it easier to have a bowel movement  Ask your healthcare provider to help you create an exercise plan  Do not have anal sex  Anal sex can weaken the skin around your rectum and anus  Avoid heavy lifting  This can cause straining and increase your risk for another hemorrhoid  Follow up with your doctor as directed:  Write down your questions so you remember to ask them during your visits  © Copyright [employer] 2022 Information is for End User's use only and may not be sold, redistributed or otherwise used for commercial purposes  All illustrations and images included in CareNotes® are the copyrighted property of [employer]  or Nannette Davidson   The above information is an  only  It is not intended as medical advice for individual conditions or treatments  Talk to your doctor, nurse or pharmacist before following any medical regimen to see if it is safe and effective for you

## 2022-09-27 NOTE — PROGRESS NOTES
Assessment/Plan:     Diagnoses and all orders for this visit:    Benign essential hypertension  Comments:  BP doing well & on re-check by me, c/w ARB  Orders:  -     Comprehensive metabolic panel; Future  -     CBC and differential    Prediabetes  Comments:  c/w exercising and lower carb diet  check FBS/A1C  Orders:  -     Hemoglobin A1C; Future    Blood in stool  Comments:  with recurrent hemorrhoids in the past   refer to colo-rectal & check CBC   t/c colonoscopy  Orders:  -     Ambulatory Referral to Colorectal Surgery; Future    External hemorrhoids  Comments:  present on exam  advised on hydration/adequate fiber intake from diet and see AVS for add'l details  Orders:  -     Ambulatory Referral to Colorectal Surgery; Future    Encounter for lipid screening for cardiovascular disease  -     Lipid Panel with Direct LDL reflex; Future    Need for hepatitis C screening test  -     Hepatitis C antibody; Future      BMI Counseling: Body mass index is 25 21 kg/m²  The BMI is above normal  Exercise recommendations include exercising 3-5 times per week  Subjective:      Patient ID: Geri Moore is a 56 y o  male  HPI    New to practice, here to establish care  Takes BP medication for HTN and cialis for BPH as per his urologist([facility])  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   Exercises regularly  Declines flu vaccine  Has strong fhx prostate cancer  He also has pre-DM for years per patient  History of hemorrhoids in past and has had colonoscopy in his 25s and again at 37 for blood in stool  No polyps found  Having blood in stool again, bright red and has some irritation from hemorrhoids currently  He does strain with BMs  He is using preparation H with symptomatic relief  Symptoms ongoing for 1 month  ROS otherwise negative, no other complaints      Past Medical History:   Diagnosis Date    Corneal abrasion     Resolved 8/23/2016     Difficult or painful urination     Resolved 8/23/2016     Hypertension     IFG (impaired fasting glucose)     Resolved 9/18/2017     Prediabetes     Resolved 9/18/2017     Vertigo      Vitals:    09/27/22 1408   BP: 130/86   BP Location: Left arm   Patient Position: Sitting   Cuff Size: Adult   Pulse: 96   Resp: 16   Temp: 98 °F (36 7 °C)   TempSrc: Tympanic   SpO2: 96%   Weight: 75 2 kg (165 lb 12 8 oz)   Height: 5' 8" (1 727 m)     Body mass index is 25 21 kg/m²  Current Outpatient Medications:     cholecalciferol (VITAMIN D3) 1,000 units tablet, Take 1 tablet by mouth daily, Disp: , Rfl:     loratadine (CLARITIN) 10 mg tablet, Take 10 mg by mouth daily, Disp: , Rfl:     olmesartan (BENICAR) 20 mg tablet, Take 1 tablet by mouth once daily, Disp: 90 tablet, Rfl: 0    Omega-3 Fatty Acids (FISH OIL) 645 MG CAPS, Take 1 capsule by mouth daily, Disp: , Rfl:     tadalafil (CIALIS) 5 MG tablet, TAKE 1 TABLET BY MOUTH ONCE DAILY AS NEEDED FOR ERECTILE DYSFUNCTION, Disp: , Rfl:     fluticasone (FLONASE) 50 mcg/act nasal spray, 2 sprays into each nostril daily (Patient not taking: No sig reported), Disp: 3 Bottle, Rfl: 3  Allergies   Allergen Reactions    Bee Venom     Flomax [Tamsulosin Hcl] Other (See Comments)     hypotension    Lisinopril Cough    Pollen Extract     Tamsulosin Other (See Comments)     hypotension  hypotension         Review of Systems   Constitutional: Negative for fever  HENT: Negative for congestion  Eyes: Negative for visual disturbance  Respiratory: Negative for shortness of breath  Cardiovascular: Negative for chest pain  Gastrointestinal: Positive for blood in stool  Negative for abdominal pain, nausea and vomiting  Endocrine: Negative for polyuria  Genitourinary: Negative for difficulty urinating  Musculoskeletal: Negative for arthralgias  Skin: Negative for rash  Allergic/Immunologic: Negative for immunocompromised state  Neurological: Negative for dizziness     Psychiatric/Behavioral: Negative for dysphoric mood  Objective:      /86 (BP Location: Left arm, Patient Position: Sitting, Cuff Size: Adult)   Pulse 96   Temp 98 °F (36 7 °C) (Tympanic)   Resp 16   Ht 5' 8" (1 727 m)   Wt 75 2 kg (165 lb 12 8 oz)   SpO2 96%   BMI 25 21 kg/m²          Physical Exam  Vitals reviewed  Constitutional:       Appearance: Normal appearance  HENT:      Head: Normocephalic and atraumatic  Right Ear: Tympanic membrane normal       Left Ear: Tympanic membrane normal    Eyes:      Conjunctiva/sclera: Conjunctivae normal    Cardiovascular:      Rate and Rhythm: Normal rate and regular rhythm  Heart sounds: No murmur heard  Pulmonary:      Effort: Pulmonary effort is normal       Breath sounds: No wheezing or rales  Abdominal:      General: Bowel sounds are normal       Palpations: Abdomen is soft  Tenderness: There is no abdominal tenderness  Genitourinary:     Rectum: External hemorrhoid (2, both not inflamed) present  No anal fissure  Musculoskeletal:      Right lower leg: No edema  Left lower leg: No edema  Neurological:      Mental Status: He is alert  Mental status is at baseline     Psychiatric:         Mood and Affect: Mood normal          Behavior: Behavior normal

## 2022-10-13 LAB
ALBUMIN SERPL-MCNC: 4.5 G/DL (ref 4–5)
ALBUMIN SERPL-MCNC: 4.7 G/DL (ref 4–5)
ALBUMIN/GLOB SERPL: 2 {RATIO} (ref 1.2–2.2)
ALBUMIN/GLOB SERPL: 2.1 {RATIO} (ref 1.2–2.2)
ALP SERPL-CCNC: 75 IU/L (ref 44–121)
ALP SERPL-CCNC: 76 IU/L (ref 44–121)
ALT SERPL-CCNC: 21 IU/L (ref 0–44)
ALT SERPL-CCNC: 23 IU/L (ref 0–44)
AST SERPL-CCNC: 25 IU/L (ref 0–40)
AST SERPL-CCNC: 28 IU/L (ref 0–40)
BASOPHILS # BLD AUTO: 0 X10E3/UL (ref 0–0.2)
BASOPHILS # BLD AUTO: 0.1 X10E3/UL (ref 0–0.2)
BASOPHILS NFR BLD AUTO: 1 %
BASOPHILS NFR BLD AUTO: 1 %
BILIRUB SERPL-MCNC: 0.4 MG/DL (ref 0–1.2)
BILIRUB SERPL-MCNC: 0.4 MG/DL (ref 0–1.2)
BUN SERPL-MCNC: 28 MG/DL (ref 6–24)
BUN SERPL-MCNC: 29 MG/DL (ref 6–24)
BUN/CREAT SERPL: 26 (ref 9–20)
BUN/CREAT SERPL: 27 (ref 9–20)
CALCIUM SERPL-MCNC: 9.3 MG/DL (ref 8.7–10.2)
CALCIUM SERPL-MCNC: 9.4 MG/DL (ref 8.7–10.2)
CHLORIDE SERPL-SCNC: 101 MMOL/L (ref 96–106)
CHLORIDE SERPL-SCNC: 103 MMOL/L (ref 96–106)
CHOLEST SERPL-MCNC: 185 MG/DL (ref 100–199)
CO2 SERPL-SCNC: 24 MMOL/L (ref 20–29)
CO2 SERPL-SCNC: 24 MMOL/L (ref 20–29)
CREAT SERPL-MCNC: 1.07 MG/DL (ref 0.76–1.27)
CREAT SERPL-MCNC: 1.08 MG/DL (ref 0.76–1.27)
CRP SERPL-MCNC: <1 MG/L (ref 0–10)
EGFR: 86 ML/MIN/1.73
EGFR: 87 ML/MIN/1.73
EOSINOPHIL # BLD AUTO: 0.1 X10E3/UL (ref 0–0.4)
EOSINOPHIL # BLD AUTO: 0.1 X10E3/UL (ref 0–0.4)
EOSINOPHIL NFR BLD AUTO: 1 %
EOSINOPHIL NFR BLD AUTO: 2 %
ERYTHROCYTE [DISTWIDTH] IN BLOOD BY AUTOMATED COUNT: 12.7 % (ref 11.6–15.4)
ERYTHROCYTE [DISTWIDTH] IN BLOOD BY AUTOMATED COUNT: 12.9 % (ref 11.6–15.4)
GLOBULIN SER-MCNC: 2.2 G/DL (ref 1.5–4.5)
GLOBULIN SER-MCNC: 2.2 G/DL (ref 1.5–4.5)
GLUCOSE SERPL-MCNC: 107 MG/DL (ref 70–99)
GLUCOSE SERPL-MCNC: 108 MG/DL (ref 70–99)
HBA1C MFR BLD: 5.2 % (ref 4.8–5.6)
HCT VFR BLD AUTO: 40.8 % (ref 37.5–51)
HCT VFR BLD AUTO: 41.6 % (ref 37.5–51)
HCV AB S/CO SERPL IA: <0.1 S/CO RATIO (ref 0–0.9)
HDLC SERPL-MCNC: 52 MG/DL
HGB BLD-MCNC: 13.8 G/DL (ref 13–17.7)
HGB BLD-MCNC: 14.1 G/DL (ref 13–17.7)
IMM GRANULOCYTES # BLD: 0 X10E3/UL (ref 0–0.1)
IMM GRANULOCYTES # BLD: 0 X10E3/UL (ref 0–0.1)
IMM GRANULOCYTES NFR BLD: 0 %
IMM GRANULOCYTES NFR BLD: 0 %
LDLC SERPL CALC-MCNC: 123 MG/DL (ref 0–99)
LYMPHOCYTES # BLD AUTO: 1.5 X10E3/UL (ref 0.7–3.1)
LYMPHOCYTES # BLD AUTO: 1.5 X10E3/UL (ref 0.7–3.1)
LYMPHOCYTES NFR BLD AUTO: 33 %
LYMPHOCYTES NFR BLD AUTO: 35 %
MCH RBC QN AUTO: 30.2 PG (ref 26.6–33)
MCH RBC QN AUTO: 30.6 PG (ref 26.6–33)
MCHC RBC AUTO-ENTMCNC: 33.8 G/DL (ref 31.5–35.7)
MCHC RBC AUTO-ENTMCNC: 33.9 G/DL (ref 31.5–35.7)
MCV RBC AUTO: 89 FL (ref 79–97)
MCV RBC AUTO: 90 FL (ref 79–97)
MONOCYTES # BLD AUTO: 0.5 X10E3/UL (ref 0.1–0.9)
MONOCYTES # BLD AUTO: 0.5 X10E3/UL (ref 0.1–0.9)
MONOCYTES NFR BLD AUTO: 10 %
MONOCYTES NFR BLD AUTO: 12 %
NEUTROPHILS # BLD AUTO: 2.3 X10E3/UL (ref 1.4–7)
NEUTROPHILS # BLD AUTO: 2.4 X10E3/UL (ref 1.4–7)
NEUTROPHILS NFR BLD AUTO: 52 %
NEUTROPHILS NFR BLD AUTO: 53 %
PLATELET # BLD AUTO: 243 X10E3/UL (ref 150–450)
PLATELET # BLD AUTO: 246 X10E3/UL (ref 150–450)
POTASSIUM SERPL-SCNC: 4.8 MMOL/L (ref 3.5–5.2)
POTASSIUM SERPL-SCNC: 5.2 MMOL/L (ref 3.5–5.2)
PROT SERPL-MCNC: 6.7 G/DL (ref 6–8.5)
PROT SERPL-MCNC: 6.9 G/DL (ref 6–8.5)
RBC # BLD AUTO: 4.57 X10E6/UL (ref 4.14–5.8)
RBC # BLD AUTO: 4.61 X10E6/UL (ref 4.14–5.8)
SODIUM SERPL-SCNC: 139 MMOL/L (ref 134–144)
SODIUM SERPL-SCNC: 140 MMOL/L (ref 134–144)
TRIGL SERPL-MCNC: 53 MG/DL (ref 0–149)
WBC # BLD AUTO: 4.5 X10E3/UL (ref 3.4–10.8)
WBC # BLD AUTO: 4.5 X10E3/UL (ref 3.4–10.8)

## 2022-10-27 PROBLEM — K62.5 RECTAL BLEEDING: Status: ACTIVE | Noted: 2022-10-27

## 2023-01-25 ENCOUNTER — OFFICE VISIT (OUTPATIENT)
Dept: URGENT CARE | Facility: CLINIC | Age: 48
End: 2023-01-25

## 2023-01-25 VITALS
TEMPERATURE: 97.6 F | WEIGHT: 165 LBS | SYSTOLIC BLOOD PRESSURE: 118 MMHG | HEART RATE: 58 BPM | OXYGEN SATURATION: 98 % | BODY MASS INDEX: 25.09 KG/M2 | DIASTOLIC BLOOD PRESSURE: 88 MMHG | RESPIRATION RATE: 14 BRPM

## 2023-01-25 DIAGNOSIS — J02.9 SORE THROAT: Primary | ICD-10-CM

## 2023-01-25 LAB — S PYO AG THROAT QL: NEGATIVE

## 2023-01-25 RX ORDER — TADALAFIL 2.5 MG/1
2.5 TABLET ORAL DAILY
COMMUNITY
Start: 2023-01-11

## 2023-01-25 NOTE — PROGRESS NOTES
[facility]        NAME: Karie Dubin is a 53 y o  male  : 1975    MRN: [id number]  DATE: 2023  TIME: 1:35 PM    Assessment and Plan   Sore throat [J02 9]  1  Sore throat  POCT rapid strepA        Recommend supportive care with salt water gargles, NSAIDs prn  Discussed importance of maintaining adequate hydration  rtc for fevers or worsening sore throat  Discussed strict return to care precautions as well as red flag symptoms which should prompt immediate ED referral  Pt verbalized understanding and is in agreement with plan  Please follow up with your primary care provider within the next week  Please remember that your visit today was with an urgent care provider and should not replace follow up with your primary care provider for chronic medical issues or annual physicals  Patient Instructions       Follow up with PCP in 3-5 days  Proceed to  ER if symptoms worsen  Chief Complaint     Chief Complaint   Patient presents with   • Sore Throat     Pt presents with cough, sore throat; started two days ago; positive exp to strep in household         History of Present Illness       Karie Dubin is a(n) 53 y o  male presenting with URI symptoms x 2 days  Past medical history: HTN, prediabetes  Congestion: no  Sore throat: yes  Cough: yes  Sputum production: no  Fever: no  Body aches: no  Loss of smell/taste: no  GI symptoms: no  Known sick contacts: yes, 2 kids have strep throat  OTC meds tried: flonase, claritin        Review of Systems   Review of Systems   Constitutional: Negative for chills, diaphoresis, fatigue and fever  HENT: Positive for sore throat  Negative for congestion, ear pain, postnasal drip, rhinorrhea, sinus pain, sneezing and trouble swallowing  Eyes: Negative for pain and redness  Respiratory: Positive for cough  Negative for chest tightness, shortness of breath and wheezing  Cardiovascular: Negative for chest pain and leg swelling  Gastrointestinal: Negative for diarrhea, nausea and vomiting  Musculoskeletal: Negative for myalgias  Neurological: Negative for dizziness, weakness and headaches           Current Medications       Current Outpatient Medications:   •  cholecalciferol (VITAMIN D3) 1,000 units tablet, Take 1 tablet by mouth daily, Disp: , Rfl:   •  fluticasone (FLONASE) 50 mcg/act nasal spray, 2 sprays into each nostril daily, Disp: 3 Bottle, Rfl: 3  •  loratadine (CLARITIN) 10 mg tablet, Take 10 mg by mouth daily, Disp: , Rfl:   •  olmesartan (BENICAR) 20 mg tablet, Take 1 tablet by mouth once daily, Disp: 90 tablet, Rfl: 1  •  Omega-3 Fatty Acids (FISH OIL) 645 MG CAPS, Take 1 capsule by mouth daily, Disp: , Rfl:   •  tadalafil (CIALIS) 2 5 MG tablet, Take 2 5 mg by mouth daily, Disp: , Rfl:     Current Allergies     Allergies as of 01/25/2023 - Reviewed 01/25/2023   Allergen Reaction Noted   • Bee venom Swelling 06/12/2014   • Flomax [tamsulosin hcl] Other (See Comments) 07/30/2016   • Lisinopril Cough 11/18/2014   • Pollen extract  06/12/2014   • Tamsulosin Other (See Comments) 12/30/2016            The following portions of the patient's history were reviewed and updated as appropriate: allergies, current medications, past family history, past medical history, past social history, past surgical history and problem list      Past Medical History:   Diagnosis Date   • Corneal abrasion     Resolved 8/23/2016    • Difficult or painful urination     Resolved 8/23/2016    • Hypertension    • IFG (impaired fasting glucose)     Resolved 9/18/2017    • Prediabetes     Resolved 9/18/2017    • Vertigo        Past Surgical History:   Procedure Laterality Date   • APPENDECTOMY     • HERNIA REPAIR     • KNEE SURGERY Right 2009    Lateral release    • MI LAPAROSCOPIC APPENDECTOMY N/A 7/30/2016    Procedure: APPENDECTOMY LAPAROSCOPIC;  Surgeon: Bel Poole MD;  Location: [facility];  Service: General   • SHOULDER SURGERY Right 2006 [facility]       Family History   Problem Relation Age of Onset   • Other Mother         pre-diabetes   • Hypertension Father    • Prostate cancer Father         dx'd in 62s   • Colon polyps Brother    • Prostate cancer Maternal Grandfather    • Colon cancer Paternal Grandmother 80   • Prostate cancer Paternal Grandfather    • Heart disease Paternal Grandfather          Medications have been verified  Objective   /88   Pulse 58   Temp 97 6 °F (36 4 °C)   Resp 14   Wt 74 8 kg (165 lb)   SpO2 98%   BMI 25 09 kg/m²        Physical Exam     Physical Exam  Vitals and nursing note reviewed  Constitutional:       General: He is not in acute distress  Appearance: Normal appearance  He is not toxic-appearing  HENT:      Head: Normocephalic and atraumatic  Right Ear: Tympanic membrane, ear canal and external ear normal       Left Ear: Tympanic membrane, ear canal and external ear normal       Nose: No congestion  Mouth/Throat:      Mouth: Mucous membranes are moist       Pharynx: Oropharynx is clear  No oropharyngeal exudate or posterior oropharyngeal erythema  Tonsils: No tonsillar exudate  1+ on the right  1+ on the left  Eyes:      Conjunctiva/sclera: Conjunctivae normal       Pupils: Pupils are equal, round, and reactive to light  Cardiovascular:      Rate and Rhythm: Normal rate and regular rhythm  Heart sounds: Normal heart sounds  Pulmonary:      Effort: Pulmonary effort is normal  No respiratory distress  Breath sounds: Normal breath sounds  No wheezing, rhonchi or rales  Abdominal:      General: Abdomen is flat  Palpations: Abdomen is soft  Musculoskeletal:      Cervical back: Normal range of motion and neck supple  Skin:     General: Skin is warm and dry  Capillary Refill: Capillary refill takes less than 2 seconds  Neurological:      Mental Status: He is alert and oriented to person, place, and time     Psychiatric:         Behavior: Behavior normal

## 2023-02-06 ENCOUNTER — APPOINTMENT (EMERGENCY)
Dept: RADIOLOGY | Facility: HOSPITAL | Age: 48
End: 2023-02-06

## 2023-02-06 ENCOUNTER — HOSPITAL ENCOUNTER (EMERGENCY)
Facility: HOSPITAL | Age: 48
Discharge: HOME | End: 2023-02-07
Attending: EMERGENCY MEDICINE

## 2023-02-06 DIAGNOSIS — J18.9 PNEUMONIA: Primary | ICD-10-CM

## 2023-02-06 LAB
ALBUMIN SERPL BCP-MCNC: 3.6 G/DL (ref 3.5–5)
ALP SERPL-CCNC: 73 U/L (ref 46–116)
ALT SERPL W P-5'-P-CCNC: 22 U/L (ref 12–78)
ANION GAP SERPL CALCULATED.3IONS-SCNC: 11 MMOL/L (ref 4–13)
AST SERPL W P-5'-P-CCNC: 21 U/L (ref 5–45)
BASOPHILS # BLD AUTO: 0.03 THOUSANDS/ÂΜL (ref 0–0.1)
BASOPHILS NFR BLD AUTO: 0 % (ref 0–1)
BILIRUB SERPL-MCNC: 0.98 MG/DL (ref 0.2–1)
BUN SERPL-MCNC: 23 MG/DL (ref 5–25)
CALCIUM SERPL-MCNC: 8 MG/DL (ref 8.3–10.1)
CARDIAC TROPONIN I PNL SERPL HS: 3 NG/L
CHLORIDE SERPL-SCNC: 99 MMOL/L (ref 96–108)
CO2 SERPL-SCNC: 25 MMOL/L (ref 21–32)
CREAT SERPL-MCNC: 1.22 MG/DL (ref 0.6–1.3)
EOSINOPHIL # BLD AUTO: 0 THOUSAND/ÂΜL (ref 0–0.61)
EOSINOPHIL NFR BLD AUTO: 0 % (ref 0–6)
ERYTHROCYTE [DISTWIDTH] IN BLOOD BY AUTOMATED COUNT: 12.8 % (ref 11.6–15.1)
FLUAV RNA RESP QL NAA+PROBE: NEGATIVE
FLUBV RNA RESP QL NAA+PROBE: NEGATIVE
GFR SERPL CREATININE-BSD FRML MDRD: 70 ML/MIN/1.73SQ M
GLUCOSE SERPL-MCNC: 107 MG/DL (ref 65–140)
HCT VFR BLD AUTO: 42.9 % (ref 36.5–49.3)
HGB BLD-MCNC: 14.4 G/DL (ref 12–17)
IMM GRANULOCYTES # BLD AUTO: 0.04 THOUSAND/UL (ref 0–0.2)
IMM GRANULOCYTES NFR BLD AUTO: 0 % (ref 0–2)
LACTATE SERPL-SCNC: 1.1 MMOL/L (ref 0.5–2)
LIPASE SERPL-CCNC: 71 U/L (ref 73–393)
LYMPHOCYTES # BLD AUTO: 0.7 THOUSANDS/ÂΜL (ref 0.6–4.47)
LYMPHOCYTES NFR BLD AUTO: 6 % (ref 14–44)
MCH RBC QN AUTO: 30.4 PG (ref 26.8–34.3)
MCHC RBC AUTO-ENTMCNC: 33.6 G/DL (ref 31.4–37.4)
MCV RBC AUTO: 91 FL (ref 82–98)
MONOCYTES # BLD AUTO: 0.95 THOUSAND/ÂΜL (ref 0.17–1.22)
MONOCYTES NFR BLD AUTO: 8 % (ref 4–12)
NEUTROPHILS # BLD AUTO: 10.12 THOUSANDS/ÂΜL (ref 1.85–7.62)
NEUTS SEG NFR BLD AUTO: 86 % (ref 43–75)
NRBC BLD AUTO-RTO: 0 /100 WBCS
PLATELET # BLD AUTO: 204 THOUSANDS/UL (ref 149–390)
PMV BLD AUTO: 8.9 FL (ref 8.9–12.7)
POTASSIUM SERPL-SCNC: 3.7 MMOL/L (ref 3.5–5.3)
PROT SERPL-MCNC: 6.6 G/DL (ref 6.4–8.4)
RBC # BLD AUTO: 4.73 MILLION/UL (ref 3.88–5.62)
RSV RNA RESP QL NAA+PROBE: NEGATIVE
S PYO DNA THROAT QL NAA+PROBE: NOT DETECTED
SARS-COV-2 RNA RESP QL NAA+PROBE: NEGATIVE
SODIUM SERPL-SCNC: 135 MMOL/L (ref 135–147)
WBC # BLD AUTO: 11.84 THOUSAND/UL (ref 4.31–10.16)

## 2023-02-06 RX ORDER — KETOROLAC TROMETHAMINE 30 MG/ML
30 INJECTION, SOLUTION INTRAMUSCULAR; INTRAVENOUS ONCE
Status: DISCONTINUED | OUTPATIENT
Start: 2023-02-06 | End: 2023-02-07 | Stop reason: HOSPADM

## 2023-02-06 RX ORDER — ONDANSETRON 2 MG/ML
4 INJECTION INTRAMUSCULAR; INTRAVENOUS ONCE
Status: DISCONTINUED | OUTPATIENT
Start: 2023-02-06 | End: 2023-02-07 | Stop reason: HOSPADM

## 2023-02-06 RX ADMIN — SODIUM CHLORIDE 2000 ML: 0.9 INJECTION, SOLUTION INTRAVENOUS at 23:04

## 2023-02-06 NOTE — Clinical Note
Siomara Burton was seen and treated in our emergency department on 2/6/2023  Diagnosis:     Celsa Gomez  may return to work on return date  He may return on this date: 02/13/2023         If you have any questions or concerns, please don't hesitate to call        Tank Nelson MD    ______________________________           _______________          _______________  Hospital Representative                              Date                                Time

## 2023-02-07 VITALS
HEIGHT: 68 IN | HEART RATE: 88 BPM | WEIGHT: 160 LBS | BODY MASS INDEX: 24.25 KG/M2 | OXYGEN SATURATION: 97 % | SYSTOLIC BLOOD PRESSURE: 114 MMHG | RESPIRATION RATE: 16 BRPM | DIASTOLIC BLOOD PRESSURE: 55 MMHG | TEMPERATURE: 99 F

## 2023-02-07 LAB
ATRIAL RATE: 83 BPM
P AXIS: 69 DEGREES
PR INTERVAL: 134 MS
QRS AXIS: 64 DEGREES
QRSD INTERVAL: 110 MS
QT INTERVAL: 394 MS
QTC INTERVAL: 462 MS
T WAVE AXIS: 36 DEGREES
VENTRICULAR RATE: 83 BPM

## 2023-02-07 RX ORDER — AMOXICILLIN AND CLAVULANATE POTASSIUM 875; 125 MG/1; MG/1
1 TABLET, FILM COATED ORAL EVERY 12 HOURS
Qty: 20 TABLET | Refills: 0 | Status: SHIPPED | OUTPATIENT
Start: 2023-02-07 | End: 2023-02-17

## 2023-02-07 RX ORDER — AMOXICILLIN AND CLAVULANATE POTASSIUM 875; 125 MG/1; MG/1
1 TABLET, FILM COATED ORAL ONCE
Status: COMPLETED | OUTPATIENT
Start: 2023-02-07 | End: 2023-02-07

## 2023-02-07 RX ORDER — AZITHROMYCIN 250 MG/1
500 TABLET, FILM COATED ORAL ONCE
Status: COMPLETED | OUTPATIENT
Start: 2023-02-07 | End: 2023-02-07

## 2023-02-07 RX ORDER — AZITHROMYCIN 250 MG/1
TABLET, FILM COATED ORAL
Qty: 4 TABLET | Refills: 0 | Status: SHIPPED | OUTPATIENT
Start: 2023-02-07 | End: 2023-02-11

## 2023-02-07 RX ADMIN — AZITHROMYCIN MONOHYDRATE 500 MG: 250 TABLET ORAL at 01:08

## 2023-02-07 RX ADMIN — AMOXICILLIN AND CLAVULANATE POTASSIUM 1 TABLET: 875; 125 TABLET ORAL at 01:08

## 2023-02-07 RX ADMIN — IOHEXOL 100 ML: 350 INJECTION, SOLUTION INTRAVENOUS at 00:16

## 2023-02-07 NOTE — ED PROVIDER NOTES
History  Chief Complaint   Patient presents with   • Flu Symptoms     Pt arrives from work, reports that he had near syncopal episode at work, has been feeling sick for a week, reports sore throat, fever in transit, and tonight and episode of near syncope and vomiting  Patient is a 32-year-old male  He has been sick for almost 2 weeks  His children were sick with strep throat  He developed sore throat  He was seen in urgent care and tested negative for strep  Patient has had cough and congestion  He has had rhinorrhea  Today at work he felt near syncopal   He was found to have a temperature 101  He had nausea and vomiting  No diarrhea  The vomiting was not bilious or bloody  He did experience some left-sided sharp abdominal pain  He does have a history of an enlarged prostate  He denies dysuria  No history of kidney stones  No hematuria  He has had an appendectomy  No rashes  Symptoms are moderate in severity without aggravating or relieving factors  She does report decreased p o  intake  Prior to Admission Medications   Prescriptions Last Dose Informant Patient Reported? Taking?    Omega-3 Fatty Acids (FISH OIL) 645 MG CAPS   Yes No   Sig: Take 1 capsule by mouth daily   cholecalciferol (VITAMIN D3) 1,000 units tablet   Yes No   Sig: Take 1 tablet by mouth daily   fluticasone (FLONASE) 50 mcg/act nasal spray   No No   Si sprays into each nostril daily   loratadine (CLARITIN) 10 mg tablet   Yes No   Sig: Take 10 mg by mouth daily   olmesartan (BENICAR) 20 mg tablet   No No   Sig: Take 1 tablet by mouth once daily   tadalafil (CIALIS) 2 5 MG tablet   Yes No   Sig: Take 2 5 mg by mouth daily      Facility-Administered Medications: None       Past Medical History:   Diagnosis Date   • Corneal abrasion     Resolved 2016    • Difficult or painful urination     Resolved 2016    • Hypertension    • IFG (impaired fasting glucose)     Resolved 2017    • Prediabetes Resolved 9/18/2017    • Vertigo        Past Surgical History:   Procedure Laterality Date   • APPENDECTOMY     • HERNIA REPAIR     • KNEE SURGERY Right 2009    Lateral release    • FL LAPAROSCOPIC APPENDECTOMY N/A 7/30/2016    Procedure: Shashank Riley;  Surgeon: Kirstin Mcgee MD;  Location: [facility];  Service: General   • SHOULDER SURGERY Right 2006    SLAP       Family History   Problem Relation Age of Onset   • Other Mother         pre-diabetes   • Hypertension Father    • Prostate cancer Father         dx'd in 62s   • Colon polyps Brother    • Prostate cancer Maternal Grandfather    • Colon cancer Paternal Grandmother 80   • Prostate cancer Paternal Grandfather    • Heart disease Paternal Grandfather      I have reviewed and agree with the history as documented  E-Cigarette/Vaping   • E-Cigarette Use Never User      E-Cigarette/Vaping Substances   • Nicotine No    • THC No    • CBD No    • Flavoring No    • Other No    • Unknown No      Social History     Tobacco Use   • Smoking status: Never     Passive exposure: Never   • Smokeless tobacco: Never   Vaping Use   • Vaping Use: Never used   Substance Use Topics   • Alcohol use: Yes     Comment: rare   • Drug use: No       Review of Systems   Constitutional: Positive for fever  Negative for chills  HENT: Positive for congestion, rhinorrhea and sore throat  Eyes: Negative for pain, redness and visual disturbance  Respiratory: Positive for cough  Negative for shortness of breath  Cardiovascular: Negative for chest pain and leg swelling  Gastrointestinal: Positive for abdominal pain, nausea and vomiting  Negative for diarrhea  Endocrine: Negative for polydipsia and polyuria  Genitourinary: Negative for dysuria, frequency and hematuria  Musculoskeletal: Negative for back pain and neck pain  Skin: Negative for rash and wound  Allergic/Immunologic: Negative for immunocompromised state  Neurological: Positive for dizziness   Negative for weakness, numbness and headaches  Psychiatric/Behavioral: Negative for hallucinations and suicidal ideas  All other systems reviewed and are negative  Physical Exam  Physical Exam  Vitals reviewed  Constitutional:       General: He is not in acute distress  Appearance: He is not ill-appearing, toxic-appearing or diaphoretic  HENT:      Head: Normocephalic and atraumatic  Nose: Nose normal       Mouth/Throat:      Mouth: Mucous membranes are moist       Pharynx: Oropharynx is clear  No oropharyngeal exudate or posterior oropharyngeal erythema  Eyes:      General:         Right eye: No discharge  Left eye: No discharge  Conjunctiva/sclera: Conjunctivae normal    Cardiovascular:      Rate and Rhythm: Normal rate and regular rhythm  Pulses: Normal pulses  Heart sounds: Normal heart sounds  No murmur heard  No friction rub  No gallop  Pulmonary:      Effort: Pulmonary effort is normal  No respiratory distress  Breath sounds: Normal breath sounds  No stridor  No wheezing, rhonchi or rales  Abdominal:      General: Bowel sounds are normal  There is no distension  Palpations: Abdomen is soft  Tenderness: There is abdominal tenderness  There is no right CVA tenderness, left CVA tenderness, guarding or rebound  Comments: There is mild to moderate left sided abdominal pain  Musculoskeletal:         General: No swelling, tenderness, deformity or signs of injury  Normal range of motion  Cervical back: Normal range of motion and neck supple  No rigidity  Right lower leg: No edema  Left lower leg: No edema  Comments: No calf pain or unilateral leg swelling   Skin:     General: Skin is warm and dry  Coloration: Skin is not jaundiced or pale  Findings: No bruising, erythema or rash  Neurological:      General: No focal deficit present  Mental Status: He is alert and oriented to person, place, and time        Cranial Nerves: No facial asymmetry  Sensory: No sensory deficit  Motor: Motor function is intact     Psychiatric:         Mood and Affect: Mood normal          Behavior: Behavior normal          Vital Signs  ED Triage Vitals   Temperature Pulse Respirations Blood Pressure SpO2   02/06/23 2218 02/06/23 2218 02/06/23 2218 02/06/23 2226 02/06/23 2218   99 °F (37 2 °C) 75 15 105/57 97 %      Temp Source Heart Rate Source Patient Position - Orthostatic VS BP Location FiO2 (%)   02/06/23 2218 02/06/23 2218 02/06/23 2226 02/06/23 2226 --   Oral Monitor Sitting Right arm       Pain Score       --                  Vitals:    02/06/23 2218 02/06/23 2226   BP:  105/57   Pulse: 75    Patient Position - Orthostatic VS:  Sitting         Visual Acuity      ED Medications  Medications   sodium chloride 0 9 % bolus 2,000 mL (has no administration in time range)   ondansetron (ZOFRAN) injection 4 mg (has no administration in time range)   ketorolac (TORADOL) injection 30 mg (has no administration in time range)       Diagnostic Studies  Results Reviewed     Procedure Component Value Units Date/Time    CBC and differential [[id number]]     Lab Status: No result Specimen: Blood     FLU/RSV/COVID - if FLU/RSV clinically relevant [[id number]]     Lab Status: No result Specimen: Nares from Nose     Blood culture #1 [[id number]]     Lab Status: No result Specimen: Blood     Blood culture #2 [[id number]]     Lab Status: No result Specimen: Blood     UA w Reflex to Microscopic w Reflex to Culture [[id number]]     Lab Status: No result Specimen: Urine     Comprehensive metabolic panel [[id number]]     Lab Status: No result Specimen: Blood     Lipase [[id number]]     Lab Status: No result Specimen: Blood     Lactic acid [[id number]]     Lab Status: No result Specimen: Blood     HS Troponin 0hr (reflex protocol) [[id number]]     Lab Status: No result Specimen: Blood     Strep A PCR [[id number]]     Lab Status: No result Specimen: Throat CT chest abdomen pelvis w contrast    (Results Pending)              Procedures  ECG 12 Lead Documentation Only    Date/Time: 2/6/2023 11:17 PM  Performed by: Luci German MD  Authorized by: Luci German MD     ECG reviewed by me, the ED Provider: yes    Patient location:  ED  Comments:      Normal sinus rhythm  Incomplete right bundle branch block  Borderline EKG  No acute ischemic ST or T wave changes  No arrhythmia  There is some baseline artifact  ED Course                               SBIRT 22yo+    Flowsheet Row Most Recent Value   SBIRT (23 yo +)    In order to provide better care to our patients, we are screening all of our patients for alcohol and drug use  Would it be okay to ask you these screening questions? Yes Filed at: 02/06/2023 2227   Initial Alcohol Screen: US AUDIT-C     1  How often do you have a drink containing alcohol? 0 Filed at: 02/06/2023 2227   2  How many drinks containing alcohol do you have on a typical day you are drinking? 0 Filed at: 02/06/2023 2227   3a  Male UNDER 65: How often do you have five or more drinks on one occasion? 0 Filed at: 02/06/2023 2227   3b  FEMALE Any Age, or MALE 65+: How often do you have 4 or more drinks on one occassion? 0 Filed at: 02/06/2023 2227   Audit-C Score 0 Filed at: 02/06/2023 2227   JACOB: How many times in the past year have you    Used an illegal drug or used a prescription medication for non-medical reasons? Never Filed at: 02/06/2023 2227                    Medical Decision Making  COVID-negative  Strep negative  Flu negative  Imaging showed a left lower lobe pneumonia  There may be some developing right lower lobe pneumonia also  No respiratory distress  Oxygen saturations are normal   Patient is not septic  No intra-abdominal infection  Patient is appropriate for discharge and outpatient management  Amount and/or Complexity of Data Reviewed  External Data Reviewed: labs       Details: Negative strep screen  Labs: ordered  Decision-making details documented in ED Course  Radiology: ordered and independent interpretation performed  Decision-making details documented in ED Course  Risk  Prescription drug management  Decision regarding hospitalization  Disposition  Final diagnoses:   None     ED Disposition     None      Follow-up Information    None         Patient's Medications   Discharge Prescriptions    No medications on file       No discharge procedures on file      PDMP Review     None          ED Provider  Electronically Signed by           Dilip Coelho MD  02/07/23 0101

## 2023-02-07 NOTE — ED NOTES
Patient transported to [address] [place] [address] Drive, [facility]  02/06/23 2350

## 2023-02-12 LAB
BACTERIA BLD CULT: NORMAL
BACTERIA BLD CULT: NORMAL

## 2023-02-13 ENCOUNTER — OFFICE VISIT (OUTPATIENT)
Dept: INTERNAL MEDICINE CLINIC | Facility: CLINIC | Age: 48
End: 2023-02-13

## 2023-02-13 VITALS
DIASTOLIC BLOOD PRESSURE: 80 MMHG | HEIGHT: 68 IN | OXYGEN SATURATION: 97 % | WEIGHT: 163 LBS | RESPIRATION RATE: 19 BRPM | SYSTOLIC BLOOD PRESSURE: 132 MMHG | TEMPERATURE: 98.2 F | HEART RATE: 67 BPM | BODY MASS INDEX: 24.71 KG/M2

## 2023-02-13 DIAGNOSIS — Z13.6 ENCOUNTER FOR LIPID SCREENING FOR CARDIOVASCULAR DISEASE: ICD-10-CM

## 2023-02-13 DIAGNOSIS — I10 BENIGN ESSENTIAL HYPERTENSION: ICD-10-CM

## 2023-02-13 DIAGNOSIS — Z13.220 ENCOUNTER FOR LIPID SCREENING FOR CARDIOVASCULAR DISEASE: ICD-10-CM

## 2023-02-13 DIAGNOSIS — J18.9 PNEUMONIA OF LEFT LOWER LOBE DUE TO INFECTIOUS ORGANISM: Primary | ICD-10-CM

## 2023-02-13 DIAGNOSIS — R73.03 PREDIABETES: ICD-10-CM

## 2023-02-13 NOTE — PROGRESS NOTES
Name: Jez Vasquez      : 1975      MRN: [id number]  Encounter Provider: Alden Foster DO  Encounter Date: 2023   Encounter department: [place] [address]  Pneumonia of left lower lobe due to infectious organism  Comments:  doing better, complete the antibiotic and f/u as needed or in fall for annual physical    2  Benign essential hypertension  Comments:  BP doing well, c/w ARB  Orders:  -     Comprehensive metabolic panel; Future; Expected date: 2023    3  Prediabetes  Comments:  A1C improved on last BW   c/w lower carb intake from diet and check A1C with next BW  Orders:  -     Hemoglobin A1C; Future; Expected date: 2023    4  Encounter for lipid screening for cardiovascular disease  -     Lipid Panel with Direct LDL reflex; Future; Expected date: 2023           Subjective      HPI     Here for follow up from [facility]  Was diagnosed as having pneumonia and treated with z-pack and augmentin antibiotic  He is feeling more than 50% better now and is still taking augmentin  He has no AE from the antibiotic and is taking it with food  He is having some lingering fatigue but this is improving slowly as well  He is returning to work this Wednesday  He has no other questions or concerns today & ROS Is otherwise negative  Review of Systems   Constitutional: Positive for fatigue  Negative for fever  HENT: Positive for congestion (but improving)  Respiratory: Positive for cough (but improving)  Negative for shortness of breath  Cardiovascular: Negative for chest pain  Gastrointestinal: Negative for diarrhea  Musculoskeletal: Negative for arthralgias  Allergic/Immunologic: Negative for immunocompromised state  Neurological: Negative for dizziness  Psychiatric/Behavioral: Negative for dysphoric mood         Current Outpatient Medications on File Prior to Visit   Medication Sig   • amoxicillin-clavulanate (AUGMENTIN) 875-125 mg per tablet Take 1 tablet by mouth every 12 (twelve) hours for 10 days   • cholecalciferol (VITAMIN D3) 1,000 units tablet Take 1 tablet by mouth daily   • fluticasone (FLONASE) 50 mcg/act nasal spray 2 sprays into each nostril daily   • loratadine (CLARITIN) 10 mg tablet Take 10 mg by mouth daily   • olmesartan (BENICAR) 20 mg tablet Take 1 tablet by mouth once daily   • Omega-3 Fatty Acids (FISH OIL) 645 MG CAPS Take 1 capsule by mouth daily   • tadalafil (CIALIS) 2 5 MG tablet Take 2 5 mg by mouth daily       Objective     /80 (BP Location: Left arm, Patient Position: Sitting, Cuff Size: Adult)   Pulse 67   Temp 98 2 °F (36 8 °C) (Tympanic)   Resp 19   Ht 5' 8" (1 727 m)   Wt 73 9 kg (163 lb)   SpO2 97%   BMI 24 78 kg/m²     Physical Exam  Vitals reviewed  Constitutional:       General: He is not in acute distress  HENT:      Head: Normocephalic and atraumatic  Eyes:      Conjunctiva/sclera: Conjunctivae normal    Cardiovascular:      Rate and Rhythm: Normal rate and regular rhythm  Heart sounds: No murmur heard  Pulmonary:      Effort: Pulmonary effort is normal       Breath sounds: No wheezing or rales  Abdominal:      General: Bowel sounds are normal       Palpations: Abdomen is soft  Tenderness: There is no abdominal tenderness  Neurological:      Mental Status: He is alert  Mental status is at baseline     Psychiatric:         Mood and Affect: Mood normal          Behavior: Behavior normal        Galdino Wiggins DO

## 2023-03-08 ENCOUNTER — TELEPHONE (OUTPATIENT)
Dept: NEUROLOGY | Facility: CLINIC | Age: 48
End: 2023-03-08

## 2023-03-08 NOTE — TELEPHONE ENCOUNTER
Left voicemail message for patient to call back to confirm appt in [facility] office in neurology      Patient called back appt confirmed

## 2023-03-16 ENCOUNTER — OFFICE VISIT (OUTPATIENT)
Dept: NEUROLOGY | Facility: CLINIC | Age: 48
End: 2023-03-16

## 2023-03-16 VITALS
BODY MASS INDEX: 24.28 KG/M2 | SYSTOLIC BLOOD PRESSURE: 126 MMHG | HEART RATE: 68 BPM | DIASTOLIC BLOOD PRESSURE: 86 MMHG | HEIGHT: 68 IN | WEIGHT: 160.2 LBS

## 2023-03-16 DIAGNOSIS — G54.5 PARSONAGE-TURNER SYNDROME: Primary | ICD-10-CM

## 2023-03-16 NOTE — PROGRESS NOTES
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  54 y/o m w h/o Rt shoulder cartilage repair in 2007, HTN, pain in neck presents here as a follow up for evaluation for pectoral muscle atrophy/strength       To review, pt started having pain in his neck in Jan-Feb, radiating to Rt shoulder and arm, it was initially severe, associated with numbness, did PT pain/numbness got better in 1-2 months  In May noted atrophy/decreased bulk of pectoralis muscle, triceps in Rt compared to left  MRI C spine- C6-7 degenerative disc disease with left greater than right uncinate joint hypertrophic change and annular bulging  He is stable at this time  Patchy vibration started 2022, resolved a month ago  Plan-  Follow up as needed          Diagnoses and all orders for this visit:    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           Subjective:    54 y/o m w h/o Rt shoulder cartilage repair in 2007, HTN, pain in neck presents here as a follow up for evaluation for pectoral muscle atrophy/strength       To review, pt started having pain in his neck in Jan-Feb, radiating to Rt shoulder and arm, it was initially severe, associated with numbness, did PT pain/numbness got better in 1-2 months  In May noted atrophy/decreased bulk of pectoralis muscle, triceps in Rt compared to left  MRI C spine- C6-7 degenerative disc disease with left greater than right uncinate joint hypertrophic change and annular bulging  Since last visit, no new weakness, no new atrophy  Patchy vibration started End of 2022-   Reports vibration in right foot, intermittent, lasted for a week then resolved  Left foot- less instense, lasted for a day  Also noted in calf and thigh region  Groin intermittent- lasted for few days  Worse on sitting  Pneumonia a month ago around the same time he had that patchy vibration         The following portions of the patient's history were reviewed and updated as appropriate: allergies, current medications, past family history, past medical history, past social history, past surgical history and problem list          Objective:    Blood pressure 126/86, pulse 68, height 5' 8" (1 727 m), weight 72 7 kg (160 lb 3 2 oz)  Physical Exam  Vitals reviewed  Constitutional:       Appearance: Normal appearance  HENT:      Head: Normocephalic  Mouth/Throat:      Mouth: Mucous membranes are moist       Pharynx: Oropharynx is clear  Eyes:      Extraocular Movements: Extraocular movements intact  Pupils: Pupils are equal, round, and reactive to light  Cardiovascular:      Rate and Rhythm: Normal rate  Pulses: Normal pulses  Pulmonary:      Effort: Pulmonary effort is normal    Abdominal:      Palpations: Abdomen is soft  Musculoskeletal:         General: Normal range of motion  Cervical back: Normal range of motion  Skin:     General: Skin is warm  Capillary Refill: Capillary refill takes less than 2 seconds  Neurological:      Mental Status: He is alert  Psychiatric:         Mood and Affect: Mood normal          Neurological Examination:      Mental Status: The patient was awake, alert, attentive, oriented to person, place, and time         Cranial Nerves:   I: smell Not tested   II: visual fields Full to confrontation  Pupils equal, round, reactive to light with normal accomodation  Fundus: benign fundus  III,IV,VI: extraocular muscles EOMI, no nystagmus   V: masseter and pterygoid strength full  Sensation in the V1 through V3 distributions intact to pinprick and light touch bilaterally  VII: Face is symmetric with no weakness noted  VIII: Audition intact to finger rub bilaterally  IX/X: Uvula midline  Soft palate elevation symmetric  XI: Trapezius and SCM strength 5/5 B/L  XII: Tongue midline with no atrophy or fasciculations with appropriate movement       Motor Examination:   No pronator drift   Bulk: Normal  No atrophy Tone: Normal  Fasciculations: None                   Deltoid Biceps Triceps WE   WF   FF IO     Right        5         5          5         5      5      5   5        Left           5        5          5-          5      5     5   5                        IP        Quad   Ham     TA       Gastroc   Right      5            5          5         5                5  Left         5            5         5         5                5         Reflexes:                   Biceps Brachioradialis Triceps Patella Achilles Plantars   Right          2+            2+                  1+        2+       2+         Down   Left            2+             2+                 2+         2+       2+         Down      Clonus: None     Coordination: Patient able to perform normal finger-to-nose and heel to shin appropriately  Normal rapid alternating movements       Sensory: Normal sensation to light touch, pin prick and vibratory sensation throughout       Gait:normal stance and posture, normal stride length and arm swing, normal turn around  Romberg negative        ROS:    Review of Systems   Constitutional: Negative for chills and fever  HENT: Negative for ear pain and sore throat  Eyes: Negative for pain and visual disturbance  Respiratory: Negative for cough and shortness of breath  Cardiovascular: Negative for chest pain and palpitations  Gastrointestinal: Negative for abdominal pain and vomiting  Genitourinary: Negative for dysuria and hematuria  Musculoskeletal: Negative for arthralgias and back pain  Skin: Negative for color change and rash  Neurological: Negative for seizures and syncope  All other systems reviewed and are negative

## 2023-08-09 ENCOUNTER — OFFICE VISIT (OUTPATIENT)
Dept: INTERNAL MEDICINE CLINIC | Facility: CLINIC | Age: 48
End: 2023-08-09
Payer: COMMERCIAL

## 2023-08-09 VITALS
HEART RATE: 73 BPM | SYSTOLIC BLOOD PRESSURE: 124 MMHG | WEIGHT: 163.4 LBS | TEMPERATURE: 98 F | DIASTOLIC BLOOD PRESSURE: 82 MMHG | HEIGHT: 68 IN | OXYGEN SATURATION: 98 % | BODY MASS INDEX: 24.77 KG/M2

## 2023-08-09 DIAGNOSIS — I10 BENIGN ESSENTIAL HYPERTENSION: ICD-10-CM

## 2023-08-09 DIAGNOSIS — Z00.00 WELLNESS EXAMINATION: Primary | ICD-10-CM

## 2023-08-09 DIAGNOSIS — R73.03 PREDIABETES: ICD-10-CM

## 2023-08-09 DIAGNOSIS — J30.9 ALLERGIC RHINITIS, UNSPECIFIED SEASONALITY, UNSPECIFIED TRIGGER: ICD-10-CM

## 2023-08-09 PROBLEM — Z13.89 SCREENING FOR CARDIOVASCULAR, RESPIRATORY, AND GENITOURINARY DISEASES: Status: RESOLVED | Noted: 2018-11-30 | Resolved: 2023-08-09

## 2023-08-09 PROBLEM — Z13.6 SCREENING FOR CARDIOVASCULAR, RESPIRATORY, AND GENITOURINARY DISEASES: Status: RESOLVED | Noted: 2018-11-30 | Resolved: 2023-08-09

## 2023-08-09 PROBLEM — R73.01 IFG (IMPAIRED FASTING GLUCOSE): Status: RESOLVED | Noted: 2019-06-05 | Resolved: 2023-08-09

## 2023-08-09 PROBLEM — Z13.83 SCREENING FOR CARDIOVASCULAR, RESPIRATORY, AND GENITOURINARY DISEASES: Status: RESOLVED | Noted: 2018-11-30 | Resolved: 2023-08-09

## 2023-08-09 PROBLEM — Z12.5 PROSTATE CANCER SCREENING: Status: RESOLVED | Noted: 2018-11-30 | Resolved: 2023-08-09

## 2023-08-09 PROCEDURE — 99396 PREV VISIT EST AGE 40-64: CPT | Performed by: INTERNAL MEDICINE

## 2023-08-09 RX ORDER — OLMESARTAN MEDOXOMIL 20 MG/1
20 TABLET ORAL DAILY
Qty: 90 TABLET | Refills: 3 | Status: SHIPPED | OUTPATIENT
Start: 2023-08-09

## 2023-08-09 NOTE — PROGRESS NOTES
Name: Johana Reardon      : 1975      MRN: [id number]  Encounter Provider: Dorys Alejandro DO  Encounter Date: 2023   Encounter department: [address] [place] [address]     1. Wellness examination    2. Benign essential hypertension  Comments:  BP doing well, c/w ARB. [facility] as it appears he may have mail order rx benefit(per Epic EMR pop up recommendation)  Orders:  -     olmesartan (BENICAR) 20 mg tablet; Take 1 tablet (20 mg total) by mouth daily    3. Allergic rhinitis, unspecified seasonality, unspecified trigger  Comments:  taking flonase and stable, c/w rx    4. Prediabetes  Comments:  has been pre-diabetic since his 20s per patient and it has remained essentially unchanged. BW ordered for later september, he will complete        Depression Screening and Follow-up Plan: Patient was screened for depression during today's encounter. They screened negative with a PHQ-2 score of 0. Subjective      HPI     Here for physical, Paty Colon is overall doing well. He is taking his BP medication as prescribed. He completed colonoscopy and had 1 polyp removed and grade II internal hemorrhoids. 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 He is active and can readily complete the police agility test requirements per patient. ROS otherwise negative, no other complaints. Review of Systems   Constitutional: Negative for fever. HENT: Negative for congestion. Eyes: Negative for visual disturbance. Respiratory: Negative for shortness of breath. Cardiovascular: Negative for chest pain. Gastrointestinal: Negative for abdominal pain. Endocrine: Negative for polyuria. Genitourinary: Negative for difficulty urinating. Musculoskeletal: Negative for gait problem. Skin: Negative for rash.    Allergic/Immunologic: Positive for environmental allergies. Neurological: Negative for dizziness. Psychiatric/Behavioral: Negative for dysphoric mood. Current Outpatient Medications on File Prior to Visit   Medication Sig   • cholecalciferol (VITAMIN D3) 1,000 units tablet Take 1 tablet by mouth daily   • fluticasone (FLONASE) 50 mcg/act nasal spray 2 sprays into each nostril daily   • loratadine (CLARITIN) 10 mg tablet Take 10 mg by mouth daily   • Omega-3 Fatty Acids (FISH OIL) 645 MG CAPS Take 1 capsule by mouth daily   • tadalafil (CIALIS) 2.5 MG tablet Take 2.5 mg by mouth daily   • [DISCONTINUED] olmesartan (BENICAR) 20 mg tablet Take 1 tablet by mouth once daily       Objective     /82 (BP Location: Left arm, Patient Position: Sitting, Cuff Size: Standard)   Pulse 73   Temp 98 °F (36.7 °C)   Ht 5' 8" (1.727 m)   Wt 74.1 kg (163 lb 6.4 oz)   SpO2 98%   BMI 24.84 kg/m²     Physical Exam  Vitals reviewed. Constitutional:       General: He is not in acute distress. HENT:      Head: Normocephalic and atraumatic. Right Ear: Tympanic membrane normal.      Left Ear: Tympanic membrane normal.   Eyes:      Conjunctiva/sclera: Conjunctivae normal.   Cardiovascular:      Rate and Rhythm: Normal rate and regular rhythm. Heart sounds: No murmur heard. Pulmonary:      Effort: Pulmonary effort is normal.      Breath sounds: No wheezing or rales. Abdominal:      General: Bowel sounds are normal.      Palpations: Abdomen is soft. Tenderness: There is no abdominal tenderness. Musculoskeletal:      Right lower leg: No edema. Left lower leg: No edema. Lymphadenopathy:      Cervical: No cervical adenopathy. Neurological:      Mental Status: He is alert. Mental status is at baseline.    Psychiatric:         Mood and Affect: Mood normal.         Behavior: Behavior normal.       Galdino Wiggins DO

## 2023-09-23 NOTE — TELEPHONE ENCOUNTER
No appt scheduled, sent    No further action This RN was in PT room after he had just finished lunch and he had stood and requested to just walk back and forth in his room to stretch as he was having some cramps in his calves earlier. This RN was at his side when he stated he started to feel light headed and dizzy. This RN assisted PT back to the bed but before PT was able to completely sit on bed he had a syncopal episode and slowly slide from the edge of the bed to sitting on my knee. PT was then incontinent of bowel while he was lowered to the floor when assistance arrived at bedside. Large brown soft bowel movement with no black or red coloring noted. I was able to reach call light and called for assistance. A RR was called and Dr Savage who was this Pts ED doctor yesterday upon his arrival, responded to bedside and examined PT. She then reported to Dr Rosario the Pts hospital list doctor of the situation. Once PT was back in bed, vitals were taken and Vital signs were stable. PT was then sat at the side of the bed as we cleaned him up from incontinent episode. PT was then stood at bedside to finish cleaning him up and PT had another episode of syncope and was placed back in bed and vital signs taken and they were again stable and PT was arousable in about 30 seconds. EKG was performed and was unchanged. Will continue to monitor.

## 2023-10-12 ENCOUNTER — OFFICE VISIT (OUTPATIENT)
Dept: URGENT CARE | Facility: CLINIC | Age: 48
End: 2023-10-12
Payer: COMMERCIAL

## 2023-10-12 VITALS
HEART RATE: 51 BPM | HEIGHT: 68 IN | WEIGHT: 164 LBS | RESPIRATION RATE: 18 BRPM | BODY MASS INDEX: 24.86 KG/M2 | TEMPERATURE: 97.5 F | OXYGEN SATURATION: 97 %

## 2023-10-12 DIAGNOSIS — J02.9 SORE THROAT: Primary | ICD-10-CM

## 2023-10-12 LAB
SARS-COV-2 AG UPPER RESP QL IA: NEGATIVE
VALID CONTROL: NORMAL

## 2023-10-12 PROCEDURE — 87811 SARS-COV-2 COVID19 W/OPTIC: CPT | Performed by: PHYSICIAN ASSISTANT

## 2023-10-12 PROCEDURE — 99213 OFFICE O/P EST LOW 20 MIN: CPT | Performed by: PHYSICIAN ASSISTANT

## 2023-10-12 RX ORDER — PREDNISONE 10 MG/1
20 TABLET ORAL DAILY
Qty: 6 TABLET | Refills: 0 | Status: SHIPPED | OUTPATIENT
Start: 2023-10-12 | End: 2023-10-15

## 2023-10-12 NOTE — LETTER
October 12, 2023     Patient: Raymundo Lenz  YOB: 1975  Date of Visit: 10/12/2023      To Whom it May Concern:    Nabeel Bates is under my professional care. Cindy Hernandez was seen in my office on 10/12/2023. Cindy Hernandez may return to work on 10/16/2023 . If you have any questions or concerns, please don't hesitate to call.          Sincerely,          Kris Khanna PA-C        CC: No Recipients

## 2023-10-12 NOTE — PROGRESS NOTES
[facility]        NAME: Dianne Lake is a 53 y.o. male  : 1975    MRN: [id number]  DATE: 2023  TIME: 11:53 AM    Assessment and Plan   Sore throat [J02.9]  1. Sore throat  Poct Covid 19 Rapid Antigen Test    predniSONE 10 mg tablet            Patient Instructions     Patient Instructions   - covid test   Prednisone for the swelling       Follow up with PCP in 3-5 days. Proceed to  ER if symptoms worsen. Chief Complaint     Chief Complaint   Patient presents with    Sore Throat     Sore throat 2 days, cough         History of Present Illness       The pt is a 16-year-old male presenting today for a sore throat x 2 days and a cough. Reports taking 1 leftover amoxicillin. Admits to swollen lymph nodes on the left side. Does admit to slight ear pain. No sick contacts. Review of Systems   Review of Systems   Constitutional:  Negative for activity change, appetite change, chills, diaphoresis and fever. HENT:  Positive for sore throat. Negative for congestion and rhinorrhea. Respiratory:  Positive for cough. Negative for chest tightness and shortness of breath. Cardiovascular:  Negative for chest pain and palpitations. Gastrointestinal:  Negative for abdominal pain, diarrhea, nausea and vomiting. Musculoskeletal:  Negative for arthralgias and myalgias. Skin:  Negative for color change and pallor. Neurological:  Negative for headaches.          Current Medications       Current Outpatient Medications:     olmesartan (BENICAR) 20 mg tablet, Take 1 tablet (20 mg total) by mouth daily, Disp: 90 tablet, Rfl: 3    predniSONE 10 mg tablet, Take 2 tablets (20 mg total) by mouth daily for 3 days, Disp: 6 tablet, Rfl: 0    tadalafil (CIALIS) 2.5 MG tablet, Take 2.5 mg by mouth daily, Disp: , Rfl:     cholecalciferol (VITAMIN D3) 1,000 units tablet, Take 1 tablet by mouth daily (Patient not taking: Reported on 10/12/2023), Disp: , Rfl:     fluticasone (FLONASE) 50 mcg/act nasal spray, 2 sprays into each nostril daily (Patient not taking: Reported on 10/12/2023), Disp: 3 Bottle, Rfl: 3    loratadine (CLARITIN) 10 mg tablet, Take 10 mg by mouth daily (Patient not taking: Reported on 10/12/2023), Disp: , Rfl:     Omega-3 Fatty Acids (FISH OIL) 645 MG CAPS, Take 1 capsule by mouth daily (Patient not taking: Reported on 10/12/2023), Disp: , Rfl:     Current Allergies     Allergies as of 10/12/2023 - Reviewed 10/12/2023   Allergen Reaction Noted    Bee venom Swelling 06/12/2014    Flomax [tamsulosin hcl] Other (See Comments) 07/30/2016    Lisinopril Cough 11/18/2014    Pollen extract  06/12/2014    Tamsulosin Other (See Comments) 12/30/2016            The following portions of the patient's history were reviewed and updated as appropriate: allergies, current medications, past family history, past medical history, past social history, past surgical history and problem list.     Past Medical History:   Diagnosis Date    Corneal abrasion     Resolved 8/23/2016     Difficult or painful urination     Resolved 8/23/2016     IFG (impaired fasting glucose)     Resolved 9/18/2017     Prediabetes     Resolved 9/18/2017     Vertigo        Past Surgical History:   Procedure Laterality Date    APPENDECTOMY      HERNIA REPAIR      KNEE SURGERY Right 2009    Lateral release     CO LAPAROSCOPIC APPENDECTOMY N/A 7/30/2016    Procedure: APPENDECTOMY LAPAROSCOPIC;  Surgeon: Erik Tinajero MD;  Location: [facility];  Service: General    SHOULDER SURGERY Right 2006    SLAP       Family History   Problem Relation Age of Onset    Other Mother         pre-diabetes    Endometrial cancer Mother     Hypertension Father     Prostate cancer Father         dx'd in 62s    Colon polyps Brother     Prostate cancer Maternal Grandfather     Colon cancer Paternal Grandmother 80    Prostate cancer Paternal Grandfather     Heart disease Paternal Grandfather          Medications have been verified.         Objective Pulse (!) 51   Temp 97.5 °F (36.4 °C)   Resp 18   Ht 5' 8" (1.727 m)   Wt 74.4 kg (164 lb)   SpO2 97%   BMI 24.94 kg/m²        Physical Exam     Physical Exam  Vitals and nursing note reviewed. Constitutional:       General: He is not in acute distress. Appearance: Normal appearance. He is well-developed and normal weight. He is not ill-appearing, toxic-appearing or diaphoretic. HENT:      Head: Normocephalic and atraumatic. Right Ear: Tympanic membrane and ear canal normal.      Left Ear: Tympanic membrane and ear canal normal.      Nose: Nose normal. No congestion or rhinorrhea. Mouth/Throat:      Mouth: Mucous membranes are moist. No oral lesions. Pharynx: Oropharynx is clear. Uvula midline. Posterior oropharyngeal erythema present. No pharyngeal swelling or oropharyngeal exudate. Neck:      Thyroid: No thyromegaly. Cardiovascular:      Rate and Rhythm: Normal rate and regular rhythm. Heart sounds: Normal heart sounds. No murmur heard. No friction rub. No gallop. Pulmonary:      Effort: Pulmonary effort is normal. No respiratory distress. Breath sounds: Normal breath sounds. No stridor. No wheezing, rhonchi or rales. Chest:      Chest wall: No tenderness. Abdominal:      General: Abdomen is flat. Bowel sounds are normal. There is no distension. Palpations: Abdomen is soft. Tenderness: There is no abdominal tenderness. There is no guarding. Musculoskeletal:      Cervical back: Normal range of motion. Lymphadenopathy:      Cervical: Cervical adenopathy present. Skin:     General: Skin is warm and dry. Capillary Refill: Capillary refill takes less than 2 seconds. Neurological:      Mental Status: He is alert.

## 2023-11-20 ENCOUNTER — OFFICE VISIT (OUTPATIENT)
Dept: URGENT CARE | Facility: CLINIC | Age: 48
End: 2023-11-20

## 2023-11-20 ENCOUNTER — TRANSCRIBE ORDERS (OUTPATIENT)
Dept: URGENT CARE | Facility: CLINIC | Age: 48
End: 2023-11-20
Payer: COMMERCIAL

## 2023-11-20 DIAGNOSIS — Z02.1 PRE-EMPLOYMENT EXAMINATION: ICD-10-CM

## 2023-11-20 DIAGNOSIS — Z02.1 PRE-EMPLOYMENT EXAMINATION: Primary | ICD-10-CM

## 2023-11-22 LAB
ATRIAL RATE: 48 BPM
ATRIAL RATE: 50 BPM
ATRIAL RATE: 57 BPM
P AXIS: 65 DEGREES
P AXIS: 70 DEGREES
P AXIS: 72 DEGREES
PR INTERVAL: 150 MS
PR INTERVAL: 152 MS
PR INTERVAL: 152 MS
QRS AXIS: 48 DEGREES
QRS AXIS: 49 DEGREES
QRS AXIS: 57 DEGREES
QRSD INTERVAL: 110 MS
QRSD INTERVAL: 116 MS
QRSD INTERVAL: 120 MS
QT INTERVAL: 444 MS
QT INTERVAL: 446 MS
QT INTERVAL: 448 MS
QTC INTERVAL: 396 MS
QTC INTERVAL: 408 MS
QTC INTERVAL: 434 MS
T WAVE AXIS: 19 DEGREES
T WAVE AXIS: 20 DEGREES
T WAVE AXIS: 21 DEGREES
VENTRICULAR RATE: 48 BPM
VENTRICULAR RATE: 50 BPM
VENTRICULAR RATE: 57 BPM

## 2023-11-22 PROCEDURE — 93010 ELECTROCARDIOGRAM REPORT: CPT | Performed by: INTERNAL MEDICINE

## 2023-11-24 LAB
BACTERIA UR QL AUTO: ABNORMAL /HPF
BILIRUB UR QL STRIP: NEGATIVE
CHOLEST SERPL-MCNC: 198 MG/DL
CLARITY UR: ABNORMAL
COLOR UR: ABNORMAL
GLUCOSE P FAST SERPL-MCNC: 85 MG/DL (ref 65–99)
GLUCOSE UR STRIP-MCNC: NEGATIVE MG/DL
HDLC SERPL-MCNC: 51 MG/DL
HGB UR QL STRIP.AUTO: NEGATIVE
KETONES UR STRIP-MCNC: NEGATIVE MG/DL
LDLC SERPL CALC-MCNC: 138 MG/DL (ref 0–100)
LEUKOCYTE ESTERASE UR QL STRIP: NEGATIVE
MUCOUS THREADS UR QL AUTO: ABNORMAL
NITRITE UR QL STRIP: NEGATIVE
NON-SQ EPI CELLS URNS QL MICRO: ABNORMAL /HPF
NONHDLC SERPL-MCNC: 147 MG/DL
PH UR STRIP.AUTO: 5.5 [PH]
PROT UR STRIP-MCNC: ABNORMAL MG/DL
RBC #/AREA URNS AUTO: ABNORMAL /HPF
SP GR UR STRIP.AUTO: 1.03 (ref 1–1.03)
TRIGL SERPL-MCNC: 46 MG/DL
URATE CRY URNS QL MICRO: ABNORMAL /HPF
URATE SERPL-MCNC: 6.2 MG/DL (ref 3.5–8.5)
UROBILINOGEN UR STRIP-ACNC: <2 MG/DL
WBC #/AREA URNS AUTO: ABNORMAL /HPF

## 2023-11-24 PROCEDURE — 81001 URINALYSIS AUTO W/SCOPE: CPT | Performed by: PHYSICIAN ASSISTANT

## 2023-11-24 PROCEDURE — 36415 COLL VENOUS BLD VENIPUNCTURE: CPT | Performed by: PHYSICIAN ASSISTANT

## 2023-11-24 PROCEDURE — 80061 LIPID PANEL: CPT | Performed by: PHYSICIAN ASSISTANT

## 2023-11-24 PROCEDURE — 84550 ASSAY OF BLOOD/URIC ACID: CPT | Performed by: PHYSICIAN ASSISTANT

## 2023-11-24 PROCEDURE — 82947 ASSAY GLUCOSE BLOOD QUANT: CPT | Performed by: PHYSICIAN ASSISTANT

## 2025-06-06 ENCOUNTER — OFFICE VISIT (OUTPATIENT)
Age: 50
End: 2025-06-06
Payer: COMMERCIAL

## 2025-06-06 VITALS
OXYGEN SATURATION: 95 % | HEART RATE: 74 BPM | SYSTOLIC BLOOD PRESSURE: 145 MMHG | WEIGHT: 176 LBS | HEIGHT: 68 IN | DIASTOLIC BLOOD PRESSURE: 90 MMHG | BODY MASS INDEX: 26.67 KG/M2

## 2025-06-06 DIAGNOSIS — D12.5 ADENOMATOUS POLYP OF SIGMOID COLON: ICD-10-CM

## 2025-06-06 DIAGNOSIS — K62.5 RECTAL BLEEDING: Primary | ICD-10-CM

## 2025-06-06 DIAGNOSIS — K64.8 INTERNAL HEMORRHOIDS: ICD-10-CM

## 2025-06-06 DIAGNOSIS — Z86.0100 PERSONAL HISTORY OF COLON POLYPS, UNSPECIFIED: ICD-10-CM

## 2025-06-06 PROCEDURE — PBNCHG PB NO CHARGE PLACEHOLDER: Performed by: INTERNAL MEDICINE

## 2025-06-06 PROCEDURE — 99204 OFFICE O/P NEW MOD 45 MIN: CPT | Performed by: INTERNAL MEDICINE

## 2025-06-06 PROCEDURE — 46221 LIGATION OF HEMORRHOID(S): CPT | Performed by: INTERNAL MEDICINE

## 2025-06-06 NOTE — ASSESSMENT & PLAN NOTE
Banding done.  Complications discussed.  May take Metamucil like fiber supplement as well.  Orders:  •  Anal Excision Procedures

## 2025-06-06 NOTE — PROGRESS NOTES
Name: Colby Cabrales      : 1975      MRN: [id number]  Encounter Provider: Yong Fortune MD  Encounter Date: 2025   Encounter department: [facility] [place]      Assessment & Plan  Rectal bleeding  Patient has noted some blood in the stool bright red in color, most likely hemorrhoidal.  Was found to have hemorrhoids and sigmoid adenoma 3 years ago colonoscopy by colorectal surgeon.  Patient denies any dark stools or diarrhea.  Examination today, no perianal inflammation no tenderness.  Digital exam no mass or fresh blood.  Anoscopy was performed, grade 2 hemorrhoids at 3:00 and 8:00 positions.         Internal hemorrhoids  Banding done.  Complications discussed.  May take Metamucil like fiber supplement as well.  Orders:  •  Anal Excision Procedures    Personal history of colon polyps, unspecified         Adenomatous polyp of sigmoid colon  Follow-up colonoscopy as per plans before in 5 to 7 years.         Anal Excision Procedures    Performed by: Yong Fortune MD  Authorized by: Yong Fortune MD    Universal Protocol:     Verbal consent obtained?: Yes      Written consent obtained?: Yes      Risks and benefits: Risks, benefits and alternatives were discussed      Consent given by:  Patient    Patient states understanding of procedure being performed: Yes      Patient identity confirmed:  Verbally with patient  A time out verifies correct patient, procedure, equipment, support staff and site/side marked as required:   Procedure Type: hemorrhoidectomy    Hemorrhoidectomy Details:   Hemorrhoid type: internal    Internal position:  Three o'clock  Single rubber band ligation    Patient observed postprocedure no discomfort or pain.    Follow-up in the office in a few weeks time for  banding 8 o'clock position.      History of Present Illness   Colby Cabrales is a 50 y.o. male who presents with history of blood in stools.  He believes hemorrhoid sometimes tissue sticks out  "which he pushes back in.  Denies any nausea vomiting weakness dizziness, blurred always bright red to fresh blood in color, denies any clots.  Bowels are irregular, denies any excessive constipation straining large stools and/or painful defecation.  Appetite fair weight stable.  No chest pain shortness of breath or significant upper GI symptoms.  Does not bleed or bruise easily.  Does not take any blood thinners.  Reasonably active.  Diet medication some of the current and prior records were reviewed.  Black or Bloody Stool  Pertinent negatives include no abdominal pain, arthralgias, chest pain, chills, coughing, fever, rash, sore throat or vomiting.       Review of Systems   Constitutional:  Negative for chills and fever.   HENT:  Negative for ear pain and sore throat.    Eyes:  Negative for pain and visual disturbance.   Respiratory:  Negative for cough and shortness of breath.    Cardiovascular:  Negative for chest pain and palpitations.   Gastrointestinal:  Positive for blood in stool. Negative for abdominal pain and vomiting.   Genitourinary:  Negative for dysuria and hematuria.   Musculoskeletal:  Negative for arthralgias and back pain.   Skin:  Negative for color change and rash.   Neurological:  Negative for seizures and syncope.   All other systems reviewed and are negative.   A complete review of systems is negative other than that noted above in the HPI.      Current Medications[1]  Objective   /90 (BP Location: Left arm, Patient Position: Sitting, Cuff Size: Standard)   Pulse 74   Ht 5' 8\" (1.727 m)   Wt 79.8 kg (176 lb)   SpO2 95%   BMI 26.76 kg/m²     Physical Exam  Vitals and nursing note reviewed.   Constitutional:       General: He is not in acute distress.     Appearance: Normal appearance. He is well-developed and normal weight. He is not ill-appearing.   HENT:      Head: Normocephalic and atraumatic.      Mouth/Throat:      Mouth: Mucous membranes are moist.     Eyes:      " Conjunctiva/sclera: Conjunctivae normal.       Cardiovascular:      Rate and Rhythm: Normal rate and regular rhythm.      Heart sounds: No murmur heard.  Pulmonary:      Effort: Pulmonary effort is normal. No respiratory distress.      Breath sounds: Normal breath sounds. No wheezing.   Abdominal:      General: Bowel sounds are normal. There is no distension.      Palpations: Abdomen is soft. There is no mass.      Tenderness: There is no abdominal tenderness. There is no guarding or rebound.      Hernia: No hernia is present.     Musculoskeletal:         General: No swelling.      Cervical back: Neck supple.     Skin:     General: Skin is warm.      Capillary Refill: Capillary refill takes less than 2 seconds.      Coloration: Skin is not jaundiced or pale.     Neurological:      Mental Status: He is alert and oriented to person, place, and time.      Gait: Gait normal.     Psychiatric:         Mood and Affect: Mood normal.            Lab Results: I personally reviewed relevant lab results.                    [1]  Current Outpatient Medications   Medication Sig Dispense Refill   • olmesartan (BENICAR) 20 mg tablet Take 1 tablet (20 mg total) by mouth daily 90 tablet 3   • tadalafil (CIALIS) 2.5 MG tablet Take 2.5 mg by mouth in the morning.     • cholecalciferol (VITAMIN D3) 1,000 units tablet Take 1 tablet by mouth daily (Patient not taking: Reported on 10/12/2023)     • fluticasone (FLONASE) 50 mcg/act nasal spray 2 sprays into each nostril daily (Patient not taking: Reported on 10/12/2023) 3 Bottle 3   • loratadine (CLARITIN) 10 mg tablet Take 10 mg by mouth daily (Patient not taking: Reported on 10/12/2023)       No current facility-administered medications for this visit.

## 2025-06-06 NOTE — ASSESSMENT & PLAN NOTE
Patient has noted some blood in the stool bright red in color, most likely hemorrhoidal.  Was found to have hemorrhoids and sigmoid adenoma 3 years ago colonoscopy by colorectal surgeon.  Patient denies any dark stools or diarrhea.  Examination today, no perianal inflammation no tenderness.  Digital exam no mass or fresh blood.  Anoscopy was performed, grade 2 hemorrhoids at 3:00 and 8:00 positions.

## 2025-06-19 ENCOUNTER — OFFICE VISIT (OUTPATIENT)
Age: 50
End: 2025-06-19
Payer: COMMERCIAL

## 2025-06-19 VITALS
SYSTOLIC BLOOD PRESSURE: 153 MMHG | BODY MASS INDEX: 25.98 KG/M2 | TEMPERATURE: 63 F | HEIGHT: 68 IN | WEIGHT: 171.4 LBS | DIASTOLIC BLOOD PRESSURE: 99 MMHG

## 2025-06-19 DIAGNOSIS — K64.8 INTERNAL HEMORRHOIDS: Primary | ICD-10-CM

## 2025-06-19 PROCEDURE — PBNCHG PB NO CHARGE PLACEHOLDER: Performed by: INTERNAL MEDICINE

## 2025-06-19 PROCEDURE — 46221 LIGATION OF HEMORRHOID(S): CPT | Performed by: INTERNAL MEDICINE

## 2025-06-19 NOTE — PROGRESS NOTES
Anal Excision Procedures    Performed by: Yong Fortune MD  Authorized by: Yong Fortune MD    Universal Protocol:     Verbal consent obtained?: Yes      Written consent obtained?: Yes      Risks and benefits: Risks, benefits and alternatives were discussed      Consent given by:  Patient    Patient states understanding of procedure being performed: Yes      Patient identity confirmed:  Verbally with patient  A time out verifies correct patient, procedure, equipment, support staff and site/side marked as required:   Procedure Type: hemorrhoidectomy    Hemorrhoidectomy Details:   Hemorrhoid type: internal    Internal position:  Eight o'clock  Number of columns/groups removed:  1  Single rubber band ligation